# Patient Record
Sex: FEMALE | Race: WHITE | NOT HISPANIC OR LATINO | Employment: OTHER | URBAN - METROPOLITAN AREA
[De-identification: names, ages, dates, MRNs, and addresses within clinical notes are randomized per-mention and may not be internally consistent; named-entity substitution may affect disease eponyms.]

---

## 2017-01-31 ENCOUNTER — TELEPHONE (OUTPATIENT)
Dept: ONCOLOGY | Facility: CLINIC | Age: 42
End: 2017-01-31

## 2017-01-31 ENCOUNTER — CONSULT (OUTPATIENT)
Dept: ONCOLOGY | Facility: CLINIC | Age: 42
End: 2017-01-31

## 2017-01-31 VITALS
RESPIRATION RATE: 16 BRPM | DIASTOLIC BLOOD PRESSURE: 58 MMHG | WEIGHT: 193 LBS | TEMPERATURE: 98.3 F | BODY MASS INDEX: 31.02 KG/M2 | HEART RATE: 83 BPM | HEIGHT: 66 IN | SYSTOLIC BLOOD PRESSURE: 109 MMHG

## 2017-01-31 DIAGNOSIS — R91.8 LUNG NODULE, MULTIPLE: Primary | ICD-10-CM

## 2017-01-31 DIAGNOSIS — E27.8 ADRENAL NODULE (HCC): ICD-10-CM

## 2017-01-31 PROBLEM — E27.9 ADRENAL NODULE: Status: ACTIVE | Noted: 2017-01-31

## 2017-01-31 PROCEDURE — 99205 OFFICE O/P NEW HI 60 MIN: CPT | Performed by: INTERNAL MEDICINE

## 2017-01-31 RX ORDER — TRAMADOL HYDROCHLORIDE 50 MG/1
TABLET ORAL
Refills: 0 | COMMUNITY
Start: 2017-01-17 | End: 2018-08-23

## 2017-01-31 RX ORDER — ZOLPIDEM TARTRATE 10 MG/1
TABLET ORAL
Refills: 0 | COMMUNITY
Start: 2017-01-18 | End: 2021-05-10

## 2017-01-31 NOTE — TELEPHONE ENCOUNTER
Called [facility] to make copy of CT Chest for patient to  prior to appt today.  Patient notified and will .

## 2017-01-31 NOTE — PROGRESS NOTES
CHIEF COMPLAINT:     Problem List:  Patient Active Problem List   Diagnosis   • CRPS (complex regional pain syndrome)   • Knee pain   • Depression   • Cervical facet arthropathy/cervical spondylosis without myelopathy   • Atrophy, muscle disuse   • Myofascial pain   • Muscle spasm   • Suprascapular entrapment neuropathy of right side   • Frozen shoulder   • Insomnia   • Mild obesity   • At high risk for falls   • Chronic tension-type headache, not intractable   • Physical deconditioning   • Gait disturbance   • Common migraine without aura   • Adrenal nodule   • Lung nodule, multiple         RECORDS OBTAINED:  Records of the patients history including those obtained from Dr. Nirmala urias. provider found were reviewed and summarized in detail.    HISTORY OF PRESENT ILLNESS:    ***    Past Medical History   Diagnosis Date   • Abnormal MRI, shoulder      Right Shoulder MRI 12/2010 revealed small glenohumeral joint effusion, capsulitis, tendinitis. No rotator cuff for labral tear was noted.    • Abnormal x-ray      X-Ray of the right wrist on 04/30/2014, revealed linear sclerosis within the distal radial metaphysis consistent with healed fracture. There is irregularity of the ulnar styloid consistent with prior fracture.     • Depression    • History of constipation    • History of insomnia      Seconday to pain   • History of migraine headaches    • History of varicose veins    • Pain in joint, shoulder region        Current Outpatient Prescriptions on File Prior to Visit   Medication Sig Dispense Refill   • amLODIPine (NORVASC) 2.5 MG tablet Take 1 tablet by mouth daily.     • buPROPion SR (WELLBUTRIN SR) 150 MG 12 hr tablet   0   • chlorthalidone (HYGROTEN) 50 MG tablet Take 1 tablet by mouth daily.     • DULoxetine (CYMBALTA) 60 MG capsule TAKE 1 CAPSULE BY MOUTH ONCE DAILY 30 capsule 3   • furosemide (LASIX) 20 MG tablet Take 1 tablet by mouth daily.     • GRALISE 600 MG tablet tablet take 1 tablet by mouth at bedtime 30  tablet 1   • LORazepam (ATIVAN) 1 MG tablet Take 1 tablet by mouth 2 (two) times a day.     • nortriptyline (PAMELOR) 10 MG capsule TAKE 1 TO 2 CAPSULES BY MOUTH EVERY BEDTIME 60 capsule 3   • predniSONE (DELTASONE) 20 MG tablet Take  by mouth.     • tiZANidine (ZANAFLEX) 2 MG tablet Take 1 tablet by mouth 4 (four) times a day as needed.     • tiZANidine (ZANAFLEX) 2 MG tablet take 1 tablet by mouth four times a day if needed for muscle spasm (KEEP APPOINTMENT FOR FURTHER REFILLS) 120 tablet 0   • ZIPSOR 25 MG capsule TAKE 1 CAPSULE BY MOUTH 4 TIMES A DAY AS NEEDED 120 capsule 1   • zolpidem (AMBIEN) 5 MG tablet Take  by mouth.       No current facility-administered medications on file prior to visit.        No Known Allergies    Past Surgical History   Procedure Laterality Date   • Rotator cuff repair       Rotator cuff repair followed by three surgeries and manipulation under anesthesia with Dr. Ludwig. Fourth surgery performed by Dr. Graves.  Removal of suture and subacromial decompression with lysis of adhesions 1/2011.   • Other surgical history       Left Distal Radiolnar Joint Stabilization and Capsulodesis   • Wrist surgery     • Back surgery  2014     SCS implant       OB History   No data available       Social History     Social History   • Marital status:      Spouse name: N/A   • Number of children: N/A   • Years of education: N/A     Social History Main Topics   • Smoking status: Never Smoker   • Smokeless tobacco: Never Used   • Alcohol use No   • Drug use: No   • Sexual activity: Not Asked     Other Topics Concern   • None     Social History Narrative       Family History   Problem Relation Age of Onset   • Diabetes Mother    • Hypertension Mother    • Stroke Mother    • Cancer Father    • Lymphoma Father    • Lung cancer Father    • Brain cancer Father    • Liver cancer Father    • Heart disease Maternal Grandmother        REVIEW OF SYSTEMS:  Review of Systems  ***  .  Visit Vitals   • BP  "109/58   • Pulse 83   • Temp 98.3 °F (36.8 °C)   • Resp 16   • Ht 66\" (167.6 cm)   • Wt 193 lb (87.5 kg)   • BMI 31.15 kg/m2       Physical  Exam:  Physical Exam      Performance Status: ***    Assessment/Plan     ***           Gilberto Robles MD    1/31/2017          "

## 2017-01-31 NOTE — PROGRESS NOTES
CHIEF COMPLAINT:   My CT scan is abnormal       Problem List:  Patient Active Problem List   Diagnosis   • CRPS (complex regional pain syndrome)   • Knee pain   • Depression   • Cervical facet arthropathy/cervical spondylosis without myelopathy   • Atrophy, muscle disuse   • Myofascial pain   • Muscle spasm   • Suprascapular entrapment neuropathy of right side   • Frozen shoulder   • Insomnia   • Mild obesity   • At high risk for falls   • Chronic tension-type headache, not intractable   • Physical deconditioning   • Gait disturbance   • Common migraine without aura   • Adrenal nodule   • Lung nodule, multiple         RECORDS OBTAINED:  .    HISTORY OF PRESENT ILLNESS:    This very pleasant 41-year-old   female from [facility] is here due to an abnormal CT scan of chest and upper abdomen area is a nonsmoker.  She recently fell at home and injured her sternum.  As part of her evaluation she had a CT scan of the chest obtained at the time she was in the emergency room.  That was on January 10, 2017.  Minimal trauma was noted to the sternum.  She had a 4 mm noncalcified nodule in the right middle lobe and a 3 mm lesion that was noncalcified in the right lower lobe.  She had a 2.5 cm right adrenal nodule noted.  She is here due to these findings.    She is a nonsmoker.  She has been exposed to secondhand smoke from her  as well as when she was younger from her father she's had no cough shortness of breath hemoptysis.  She's had a stable weight and appetite.  She's had no recent infectious illnesses    Past Medical History   Diagnosis Date   • Abnormal MRI, shoulder      Right Shoulder MRI 12/2010 revealed small glenohumeral joint effusion, capsulitis, tendinitis. No rotator cuff for labral tear was noted.    • Abnormal x-ray      X-Ray of the right wrist on 04/30/2014, revealed linear sclerosis within the distal radial metaphysis consistent with healed fracture. There is irregularity of  the ulnar styloid consistent with prior fracture.     • Depression    • History of constipation    • History of insomnia      Seconday to pain   • History of migraine headaches    • History of varicose veins    • Pain in joint, shoulder region        Current Outpatient Prescriptions on File Prior to Visit   Medication Sig Dispense Refill   • amLODIPine (NORVASC) 2.5 MG tablet Take 1 tablet by mouth daily.     • buPROPion SR (WELLBUTRIN SR) 150 MG 12 hr tablet   0   • chlorthalidone (HYGROTEN) 50 MG tablet Take 1 tablet by mouth daily.     • DULoxetine (CYMBALTA) 60 MG capsule TAKE 1 CAPSULE BY MOUTH ONCE DAILY 30 capsule 3   • furosemide (LASIX) 20 MG tablet Take 1 tablet by mouth daily.     • GRALISE 600 MG tablet tablet take 1 tablet by mouth at bedtime 30 tablet 1   • LORazepam (ATIVAN) 1 MG tablet Take 1 tablet by mouth 2 (two) times a day.     • nortriptyline (PAMELOR) 10 MG capsule TAKE 1 TO 2 CAPSULES BY MOUTH EVERY BEDTIME 60 capsule 3   • predniSONE (DELTASONE) 20 MG tablet Take  by mouth.     • tiZANidine (ZANAFLEX) 2 MG tablet Take 1 tablet by mouth 4 (four) times a day as needed.     • tiZANidine (ZANAFLEX) 2 MG tablet take 1 tablet by mouth four times a day if needed for muscle spasm (KEEP APPOINTMENT FOR FURTHER REFILLS) 120 tablet 0   • ZIPSOR 25 MG capsule TAKE 1 CAPSULE BY MOUTH 4 TIMES A DAY AS NEEDED 120 capsule 1   • zolpidem (AMBIEN) 5 MG tablet Take  by mouth.       No current facility-administered medications on file prior to visit.        No Known Allergies    Past Surgical History   Procedure Laterality Date   • Rotator cuff repair       Rotator cuff repair followed by three surgeries and manipulation under anesthesia with Dr. Ludwig. Fourth surgery performed by Dr. Graves.  Removal of suture and subacromial decompression with lysis of adhesions 1/2011.   • Other surgical history       Left Distal Radiolnar Joint Stabilization and Capsulodesis   • Wrist surgery     • Back surgery  2014     SCS  "implant       OB History   No data available       Social History     Social History   • Marital status:      Spouse name: N/A   • Number of children: N/A   • Years of education: N/A     Social History Main Topics   • Smoking status: Never Smoker   • Smokeless tobacco: Never Used   • Alcohol use No   • Drug use: No   • Sexual activity: Not Asked     Other Topics Concern   • None     Social History Narrative       Family History   Problem Relation Age of Onset   • Diabetes Mother    • Hypertension Mother    • Stroke Mother    • Cancer Father    • Lymphoma Father    • Lung cancer Father    • Brain cancer Father    • Liver cancer Father    • Heart disease Maternal Grandmother        REVIEW OF SYSTEMS:  Review of Systems   Constitutional: Negative for activity change and appetite change.        Has chronic fatigue that really hasn't changed recently has chronic pain involving the right shoulder and right upper extremity that began initially with right shoulder surgery approximately 6 years ago.   HENT: Negative for mouth sores, sinus pressure and voice change.    Eyes: Negative for visual disturbance.   Respiratory: Negative for shortness of breath.         Solely no new pulmonary symptoms noted by the patient or her daughter   Cardiovascular: Negative for chest pain.   Gastrointestinal: Negative for abdominal pain and vomiting.   Genitourinary: Negative for dysuria.   Musculoskeletal: Negative for arthralgias and myalgias.   Skin: Negative for color change.   Neurological: Negative for dizziness, syncope and headaches.   Hematological: Negative for adenopathy.   Psychiatric/Behavioral: Negative for confusion, sleep disturbance and suicidal ideas. The patient is not nervous/anxious.        .  Visit Vitals   • /58   • Pulse 83   • Temp 98.3 °F (36.8 °C)   • Resp 16   • Ht 66\" (167.6 cm)   • Wt 193 lb (87.5 kg)   • BMI 31.15 kg/m2       Physical  Exam:  Physical Exam   Constitutional: She is oriented to " person, place, and time. She appears well-developed and well-nourished. No distress.   Isn't alert woman in no acute distress whatsoever.  Does not appear ill.   HENT:   Head: Normocephalic.   Mouth/Throat: Oropharynx is clear and moist.   No adenopathy palpable in the head and neck or throughout the exam   Eyes: Conjunctivae and EOM are normal. No scleral icterus.   Neck: Normal range of motion. Neck supple. No thyromegaly present.   Cardiovascular: Normal rate, regular rhythm and normal heart sounds.    No murmur heard.  Pulmonary/Chest: Effort normal and breath sounds normal. She has no wheezes. She has no rales.   Lung fields are clear.  Excursion is normal.   Abdominal: Soft. Bowel sounds are normal. She exhibits no distension and no mass. There is no tenderness.   Musculoskeletal: She exhibits no edema or tenderness.   Lymphadenopathy:     She has no cervical adenopathy.   Neurological: She is alert and oriented to person, place, and time. She displays normal reflexes. No cranial nerve deficit.   She has 3+ to 4+ motor strength in the entire right upper extremity compared to the left.   Skin: Skin is warm and dry. No rash noted.   Psychiatric: She has a normal mood and affect. Judgment normal.   Vitals reviewed.        Performance Status: 1    Assessment/Plan     Pleasant 41-year-old woman with very subtle noncalcified small nodules in the right lung as well as a right adrenal normality.  I suspect all of these are benign.  He very likely has noncalcified granulomas in the right lung and she very likely has a right sided adrenal adenoma.  I reassured her that I did not think any of these represented cancer.  I will go ahead and repeat her scan in about 2 months which will be 3 months from her scan obtained in early January.  I'll follow her up at that point and decide about any further surveillance etc.           Gilberto Robles MD    1/31/2017

## 2017-02-01 ENCOUNTER — TELEPHONE (OUTPATIENT)
Dept: ONCOLOGY | Facility: CLINIC | Age: 42
End: 2017-02-01

## 2017-02-01 DIAGNOSIS — E27.8 ADRENAL NODULE (HCC): Primary | ICD-10-CM

## 2017-02-01 DIAGNOSIS — R91.8 LUNG NODULE, MULTIPLE: ICD-10-CM

## 2017-02-01 DIAGNOSIS — R91.8 LUNG NODULE, MULTIPLE: Primary | ICD-10-CM

## 2017-02-01 DIAGNOSIS — E27.8 ADRENAL NODULE (HCC): ICD-10-CM

## 2017-02-01 NOTE — TELEPHONE ENCOUNTER
LM on patient vm.  Appt 4/5/17 with scans and labs at [facility] one week prior.  Mailed appt card and lab orders to patient with instructions to have labs done with scans.

## 2017-02-10 ENCOUNTER — TELEPHONE (OUTPATIENT)
Dept: PAIN MEDICINE | Facility: CLINIC | Age: 42
End: 2017-02-10

## 2017-02-17 RX ORDER — DULOXETIN HYDROCHLORIDE 60 MG/1
CAPSULE, DELAYED RELEASE ORAL
Qty: 30 CAPSULE | Refills: 3 | Status: SHIPPED | OUTPATIENT
Start: 2017-02-17 | End: 2017-06-19 | Stop reason: SDUPTHER

## 2017-02-17 RX ORDER — GABAPENTIN 600 MG/1
TABLET, FILM COATED ORAL
Qty: 30 TABLET | Refills: 1 | Status: SHIPPED | OUTPATIENT
Start: 2017-02-17 | End: 2017-04-11 | Stop reason: SDUPTHER

## 2017-02-17 RX ORDER — NORTRIPTYLINE HYDROCHLORIDE 10 MG/1
CAPSULE ORAL
Qty: 60 CAPSULE | Refills: 3 | Status: SHIPPED | OUTPATIENT
Start: 2017-02-17 | End: 2017-06-15 | Stop reason: SDUPTHER

## 2017-03-21 ENCOUNTER — TELEPHONE (OUTPATIENT)
Dept: ONCOLOGY | Facility: CLINIC | Age: 42
End: 2017-03-21

## 2017-03-21 NOTE — TELEPHONE ENCOUNTER
----- Message from Nettie Peck sent at 3/21/2017  1:17 PM EDT -----  Regarding: CINDA-QUESTIONS  Contact: [phone]  Patient called and wants Annmarie to call her she has several questions.

## 2017-03-21 NOTE — TELEPHONE ENCOUNTER
Patient requesting her CT Scan be with contrast.  She has no allergy or reaction to the contrast and not sure why in the past it has not been ordered.      I explained to patient that Dr. Robles was out until Wednesday.  I would discuss with him and call her back.

## 2017-03-22 DIAGNOSIS — R91.1 LUNG NODULE: Primary | ICD-10-CM

## 2017-03-22 DIAGNOSIS — E27.8 ADRENAL NODULE (HCC): ICD-10-CM

## 2017-03-24 ENCOUNTER — TELEPHONE (OUTPATIENT)
Dept: ONCOLOGY | Facility: CLINIC | Age: 42
End: 2017-03-24

## 2017-03-24 NOTE — TELEPHONE ENCOUNTER
Per [facility], NPO 3 hrs prior to scan.  Water and meds ok to take.  Have labs drawn before scan on Monday.  Lab is open today 7-7, Sat 7-1, Sunday 7-12.  Arrive at least 15 minutes prior to 1pm on Monday 3/27/17.  Someone from [facility] will be calling to pre-register.  Instructed to call [facility] with any further questions.

## 2017-03-28 ENCOUNTER — TELEPHONE (OUTPATIENT)
Dept: ONCOLOGY | Facility: CLINIC | Age: 42
End: 2017-03-28

## 2017-03-28 RX ORDER — POTASSIUM CHLORIDE 1500 MG/1
20 TABLET, FILM COATED, EXTENDED RELEASE ORAL DAILY
Qty: 7 TABLET | Refills: 0 | Status: SHIPPED | OUTPATIENT
Start: 2017-03-28 | End: 2021-03-22

## 2017-03-28 NOTE — TELEPHONE ENCOUNTER
Per Dr. Robles, LM on patient vm that her potassium level was 3.1.  She needs to take KCl 20 meq daily for 7 days.  Dr. Robles thought she may be on K+ and Lasix already but it was not listed in chart.  I sent in K+ to pharmacy in case she needed.  I called and left message on her PCP Chucho Sumner MD clinical asst's vm. I faxed results also.\  Dr. Sumner phone# [phone]  Fax# [phone]

## 2017-03-30 ENCOUNTER — DOCUMENTATION (OUTPATIENT)
Dept: ONCOLOGY | Facility: CLINIC | Age: 42
End: 2017-03-30

## 2017-03-30 NOTE — PROGRESS NOTES
Patient's repeat CT scan is stable.  I will see her on April 5 at her appointment.  I called and left a message on her cell phone.

## 2017-04-11 RX ORDER — GABAPENTIN 600 MG/1
TABLET, FILM COATED ORAL
Qty: 30 TABLET | Refills: 1 | Status: SHIPPED | OUTPATIENT
Start: 2017-04-11 | End: 2017-06-19 | Stop reason: SDUPTHER

## 2017-04-11 RX ORDER — DICLOFENAC POTASSIUM 25 MG/1
CAPSULE, LIQUID FILLED ORAL
Qty: 120 CAPSULE | Refills: 1 | Status: SHIPPED | OUTPATIENT
Start: 2017-04-11 | End: 2017-08-15 | Stop reason: SDUPTHER

## 2017-04-12 ENCOUNTER — OUTSIDE FACILITY SERVICE (OUTPATIENT)
Dept: PAIN MEDICINE | Facility: CLINIC | Age: 42
End: 2017-04-12

## 2017-04-12 PROCEDURE — 64634 DESTROY C/TH FACET JNT ADDL: CPT | Performed by: ANESTHESIOLOGY

## 2017-04-12 PROCEDURE — 99152 MOD SED SAME PHYS/QHP 5/>YRS: CPT | Performed by: ANESTHESIOLOGY

## 2017-04-12 PROCEDURE — 99153 MOD SED SAME PHYS/QHP EA: CPT | Performed by: ANESTHESIOLOGY

## 2017-04-12 PROCEDURE — 64633 DESTROY CERV/THOR FACET JNT: CPT | Performed by: ANESTHESIOLOGY

## 2017-06-16 RX ORDER — NORTRIPTYLINE HYDROCHLORIDE 10 MG/1
CAPSULE ORAL
Qty: 60 CAPSULE | Refills: 3 | Status: SHIPPED | OUTPATIENT
Start: 2017-06-16 | End: 2017-10-12 | Stop reason: SDUPTHER

## 2017-06-19 RX ORDER — GABAPENTIN 600 MG/1
TABLET, FILM COATED ORAL
Qty: 30 TABLET | Refills: 1 | Status: SHIPPED | OUTPATIENT
Start: 2017-06-19 | End: 2017-08-21 | Stop reason: SDUPTHER

## 2017-06-19 RX ORDER — DULOXETIN HYDROCHLORIDE 60 MG/1
CAPSULE, DELAYED RELEASE ORAL
Qty: 30 CAPSULE | Refills: 3 | Status: SHIPPED | OUTPATIENT
Start: 2017-06-19 | End: 2017-10-18 | Stop reason: SDUPTHER

## 2017-07-20 RX ORDER — TIZANIDINE 2 MG/1
TABLET ORAL
Qty: 120 TABLET | Refills: 3 | Status: SHIPPED | OUTPATIENT
Start: 2017-07-20 | End: 2018-03-20 | Stop reason: SDUPTHER

## 2017-08-15 RX ORDER — DICLOFENAC POTASSIUM 25 MG/1
CAPSULE, LIQUID FILLED ORAL
Qty: 120 CAPSULE | Refills: 1 | Status: SHIPPED | OUTPATIENT
Start: 2017-08-15 | End: 2017-12-14 | Stop reason: SDUPTHER

## 2017-08-23 RX ORDER — GABAPENTIN 600 MG/1
TABLET, FILM COATED ORAL
Qty: 30 TABLET | Refills: 3 | OUTPATIENT
Start: 2017-08-23 | End: 2017-12-19 | Stop reason: SDUPTHER

## 2017-10-12 RX ORDER — NORTRIPTYLINE HYDROCHLORIDE 10 MG/1
CAPSULE ORAL
Qty: 60 CAPSULE | Refills: 3 | Status: SHIPPED | OUTPATIENT
Start: 2017-10-12 | End: 2018-02-19 | Stop reason: SDUPTHER

## 2017-10-18 RX ORDER — DULOXETIN HYDROCHLORIDE 60 MG/1
CAPSULE, DELAYED RELEASE ORAL
Qty: 30 CAPSULE | Refills: 3 | Status: SHIPPED | OUTPATIENT
Start: 2017-10-18 | End: 2018-02-19 | Stop reason: SDUPTHER

## 2017-12-15 RX ORDER — DICLOFENAC POTASSIUM 25 MG/1
CAPSULE, LIQUID FILLED ORAL
Qty: 120 CAPSULE | Refills: 1 | Status: SHIPPED | OUTPATIENT
Start: 2017-12-15 | End: 2018-03-20 | Stop reason: SDUPTHER

## 2017-12-19 RX ORDER — GABAPENTIN 600 MG/1
TABLET, FILM COATED ORAL
Qty: 30 TABLET | Refills: 6 | OUTPATIENT
Start: 2017-12-19 | End: 2018-06-20 | Stop reason: SDUPTHER

## 2017-12-21 ENCOUNTER — HOSPITAL ENCOUNTER (OUTPATIENT)
Age: 42
Setting detail: OBSERVATION
LOS: 1 days | Discharge: HOME | End: 2017-12-22
Payer: MEDICARE

## 2017-12-21 DIAGNOSIS — E87.6: Primary | ICD-10-CM

## 2017-12-21 PROCEDURE — 80048 BASIC METABOLIC PNL TOTAL CA: CPT

## 2017-12-21 PROCEDURE — 93005 ELECTROCARDIOGRAM TRACING: CPT

## 2017-12-21 PROCEDURE — G0378 HOSPITAL OBSERVATION PER HR: HCPCS

## 2017-12-21 PROCEDURE — 82330 ASSAY OF CALCIUM: CPT

## 2017-12-21 PROCEDURE — 80053 COMPREHEN METABOLIC PANEL: CPT

## 2017-12-21 PROCEDURE — 84100 ASSAY OF PHOSPHORUS: CPT

## 2017-12-21 PROCEDURE — 83735 ASSAY OF MAGNESIUM: CPT

## 2017-12-21 PROCEDURE — 99285 EMERGENCY DEPT VISIT HI MDM: CPT

## 2017-12-21 PROCEDURE — 36415 COLL VENOUS BLD VENIPUNCTURE: CPT

## 2017-12-21 PROCEDURE — 85025 COMPLETE CBC W/AUTO DIFF WBC: CPT

## 2017-12-21 PROCEDURE — 96365 THER/PROPH/DIAG IV INF INIT: CPT

## 2018-02-19 RX ORDER — DULOXETIN HYDROCHLORIDE 60 MG/1
CAPSULE, DELAYED RELEASE ORAL
Qty: 30 CAPSULE | Refills: 3 | Status: SHIPPED | OUTPATIENT
Start: 2018-02-19 | End: 2018-06-20 | Stop reason: SDUPTHER

## 2018-02-19 RX ORDER — NORTRIPTYLINE HYDROCHLORIDE 10 MG/1
CAPSULE ORAL
Qty: 60 CAPSULE | Refills: 3 | Status: SHIPPED | OUTPATIENT
Start: 2018-02-19 | End: 2018-06-20 | Stop reason: SDUPTHER

## 2018-03-20 RX ORDER — DICLOFENAC POTASSIUM 25 MG/1
CAPSULE, LIQUID FILLED ORAL
Qty: 120 CAPSULE | Refills: 6 | Status: SHIPPED | OUTPATIENT
Start: 2018-03-20 | End: 2018-11-01 | Stop reason: SDUPTHER

## 2018-03-20 RX ORDER — TIZANIDINE 2 MG/1
TABLET ORAL
Qty: 120 TABLET | Refills: 6 | Status: SHIPPED | OUTPATIENT
Start: 2018-03-20 | End: 2018-11-01 | Stop reason: SDUPTHER

## 2018-03-23 ENCOUNTER — HOSPITAL ENCOUNTER (INPATIENT)
Dept: HOSPITAL 22 - ER | Age: 43
LOS: 2 days | Discharge: HOME | DRG: 641 | End: 2018-03-25
Payer: MEDICARE

## 2018-03-23 VITALS
DIASTOLIC BLOOD PRESSURE: 33 MMHG | OXYGEN SATURATION: 98 % | RESPIRATION RATE: 20 BRPM | HEART RATE: 94 BPM | SYSTOLIC BLOOD PRESSURE: 87 MMHG

## 2018-03-23 VITALS
OXYGEN SATURATION: 99 % | RESPIRATION RATE: 16 BRPM | DIASTOLIC BLOOD PRESSURE: 60 MMHG | HEART RATE: 100 BPM | SYSTOLIC BLOOD PRESSURE: 115 MMHG

## 2018-03-23 VITALS
RESPIRATION RATE: 14 BRPM | SYSTOLIC BLOOD PRESSURE: 145 MMHG | OXYGEN SATURATION: 99 % | HEART RATE: 91 BPM | TEMPERATURE: 99 F | DIASTOLIC BLOOD PRESSURE: 46 MMHG

## 2018-03-23 VITALS — HEART RATE: 95 BPM

## 2018-03-23 VITALS — HEART RATE: 100 BPM

## 2018-03-23 VITALS
TEMPERATURE: 98.3 F | SYSTOLIC BLOOD PRESSURE: 92 MMHG | RESPIRATION RATE: 16 BRPM | HEART RATE: 107 BPM | OXYGEN SATURATION: 99 % | DIASTOLIC BLOOD PRESSURE: 68 MMHG

## 2018-03-23 VITALS
SYSTOLIC BLOOD PRESSURE: 126 MMHG | TEMPERATURE: 97.16 F | HEART RATE: 100 BPM | DIASTOLIC BLOOD PRESSURE: 84 MMHG | OXYGEN SATURATION: 98 % | RESPIRATION RATE: 18 BRPM

## 2018-03-23 VITALS — BODY MASS INDEX: 24.2 KG/M2 | BODY MASS INDEX: 24.5 KG/M2

## 2018-03-23 VITALS — HEART RATE: 96 BPM

## 2018-03-23 DIAGNOSIS — R55: ICD-10-CM

## 2018-03-23 DIAGNOSIS — S00.03XA: ICD-10-CM
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
## 2018-03-23 DIAGNOSIS — E89.0: ICD-10-CM

## 2018-03-23 DIAGNOSIS — E87.6: Primary | ICD-10-CM

## 2018-03-23 DIAGNOSIS — Z91.81: ICD-10-CM

## 2018-03-23 LAB
ALBUMIN LEVEL: 4 GM/DL (ref 3.4–5)
ALBUMIN/GLOB SERPL: 1 {RATIO} (ref 1.1–1.8)
ALP ISO SERPL-ACNC: 63 U/L (ref 46–116)
ALT SERPLBLD-CCNC: 17 U/L (ref 12–78)
ANION GAP SERPL CALC-SCNC: 11 MEQ/L (ref 5–15)
AST SERPL QL: 7 U/L (ref 15–37)
BACTERIA URNS QL MICRO: (no result) /LPF
BILIRUB UR STRIP-MCNC: (no result) MG/DL
BILIRUBIN,TOTAL: 0.2 MG/DL (ref 0.2–1)
BUN SERPL-MCNC: 13 MG/DL (ref 7–18)
CALCIUM SPEC-MCNC: 9.1 MG/DL (ref 8.5–10.1)
CHLORIDE SPEC-SCNC: 97 MMOL/L (ref 98–107)
CK SERPL-CCNC: 119 U/L (ref 26–192)
CKMB RELATIVE INDEX: 0.4 U/L (ref 0–4)
CO2 SERPL-SCNC: 30 MMOL/L (ref 21–32)
COLOR UR: (no result)
CREAT BLD-SCNC: 1.28 MG/DL (ref 0.55–1.02)
CREATININE CLEARANCE ESTIMATED: 60 ML/MIN (ref 0–300)
ESTIMATED GLOMERULAR FILT RATE: 45 ML/MIN (ref 60–?)
GFR (AFRICAN AMERICAN): 55 ML/MIN (ref 60–?)
GLOBULIN SER CALC-MCNC: 4.2 GM/DL (ref 1.3–3.2)
GLUCOSE: 136 MG/DL (ref 74–106)
HCT VFR BLD CALC: 44.2 % (ref 37–47)
HGB BLD-MCNC: 13.1 G/DL (ref 12.2–16.2)
KETONES UR STRIP.AUTO-MCNC: (no result) MG/DL
LEUKOCYTE ESTERASE UR QL STRIP: (no result)
MCHC RBC-ENTMCNC: 29.6 G/DL (ref 31.8–35.4)
MCV RBC: 77.8 FL (ref 81–99)
MEAN CORPUSCULAR HEMOGLOBIN: 23 PG (ref 27–31.2)
MICRO URNS: (no result)
PH UR: 5.5 [PH] (ref 5–8.5)
PLATELET # BLD: 634 K/MM3 (ref 142–424)
POTASSIUM: 2 MMOL/L (ref 3.5–5.1)
POTASSIUM: 2.7 MMOL/L (ref 3.5–5.1)
PROT SERPL-MCNC: 8.2 GM/DL (ref 6.4–8.2)
PROT UR STRIP-MCNC: (no result) MG/DL
RBC # BLD AUTO: 5.69 M/MM3 (ref 4.2–5.4)
RBC #/AREA URNS HPF: (no result) #/HPF (ref 0–3)
SODIUM SPEC-SCNC: 136 MMOL/L (ref 136–145)
SP GR UR: >= 1.03 (ref 1–1.03)
TROPONIN I: < 0.02 NG/ML (ref 0–0.06)
UROBILINOGEN UR QL: 0.2 EU/DL
WBC # BLD AUTO: 9.9 K/MM3 (ref 4.8–10.8)

## 2018-03-23 PROCEDURE — 96366 THER/PROPH/DIAG IV INF ADDON: CPT

## 2018-03-23 PROCEDURE — 72125 CT NECK SPINE W/O DYE: CPT

## 2018-03-23 PROCEDURE — 72170 X-RAY EXAM OF PELVIS: CPT

## 2018-03-23 PROCEDURE — 84484 ASSAY OF TROPONIN QUANT: CPT

## 2018-03-23 PROCEDURE — 93005 ELECTROCARDIOGRAM TRACING: CPT

## 2018-03-23 PROCEDURE — 80053 COMPREHEN METABOLIC PANEL: CPT

## 2018-03-23 PROCEDURE — 99284 EMERGENCY DEPT VISIT MOD MDM: CPT

## 2018-03-23 PROCEDURE — 84132 ASSAY OF SERUM POTASSIUM: CPT

## 2018-03-23 PROCEDURE — 36415 COLL VENOUS BLD VENIPUNCTURE: CPT

## 2018-03-23 PROCEDURE — 96365 THER/PROPH/DIAG IV INF INIT: CPT

## 2018-03-23 PROCEDURE — 80305 DRUG TEST PRSMV DIR OPT OBS: CPT

## 2018-03-23 PROCEDURE — 71045 X-RAY EXAM CHEST 1 VIEW: CPT

## 2018-03-23 PROCEDURE — 87086 URINE CULTURE/COLONY COUNT: CPT

## 2018-03-23 PROCEDURE — 82553 CREATINE MB FRACTION: CPT

## 2018-03-23 PROCEDURE — 81001 URINALYSIS AUTO W/SCOPE: CPT

## 2018-03-23 PROCEDURE — 85025 COMPLETE CBC W/AUTO DIFF WBC: CPT

## 2018-03-23 PROCEDURE — 82550 ASSAY OF CK (CPK): CPT

## 2018-03-23 PROCEDURE — 80048 BASIC METABOLIC PNL TOTAL CA: CPT

## 2018-03-23 PROCEDURE — 70450 CT HEAD/BRAIN W/O DYE: CPT

## 2018-03-23 PROCEDURE — 84703 CHORIONIC GONADOTROPIN ASSAY: CPT

## 2018-03-24 VITALS
OXYGEN SATURATION: 98 % | DIASTOLIC BLOOD PRESSURE: 54 MMHG | TEMPERATURE: 99.2 F | SYSTOLIC BLOOD PRESSURE: 114 MMHG | HEART RATE: 80 BPM | RESPIRATION RATE: 16 BRPM

## 2018-03-24 VITALS
OXYGEN SATURATION: 94 % | RESPIRATION RATE: 16 BRPM | HEART RATE: 93 BPM | DIASTOLIC BLOOD PRESSURE: 50 MMHG | SYSTOLIC BLOOD PRESSURE: 91 MMHG | TEMPERATURE: 97.6 F

## 2018-03-24 VITALS
HEART RATE: 100 BPM | OXYGEN SATURATION: 100 % | RESPIRATION RATE: 16 BRPM | DIASTOLIC BLOOD PRESSURE: 61 MMHG | TEMPERATURE: 98.8 F | SYSTOLIC BLOOD PRESSURE: 113 MMHG

## 2018-03-24 VITALS
HEART RATE: 90 BPM | OXYGEN SATURATION: 99 % | DIASTOLIC BLOOD PRESSURE: 65 MMHG | RESPIRATION RATE: 20 BRPM | TEMPERATURE: 98.4 F | SYSTOLIC BLOOD PRESSURE: 111 MMHG

## 2018-03-24 VITALS
RESPIRATION RATE: 20 BRPM | TEMPERATURE: 98.3 F | OXYGEN SATURATION: 100 % | DIASTOLIC BLOOD PRESSURE: 48 MMHG | HEART RATE: 92 BPM | SYSTOLIC BLOOD PRESSURE: 104 MMHG

## 2018-03-24 VITALS
OXYGEN SATURATION: 98 % | RESPIRATION RATE: 16 BRPM | SYSTOLIC BLOOD PRESSURE: 120 MMHG | DIASTOLIC BLOOD PRESSURE: 75 MMHG | TEMPERATURE: 99.14 F | HEART RATE: 108 BPM

## 2018-03-24 VITALS — OXYGEN SATURATION: 96 % | HEART RATE: 80 BPM

## 2018-03-24 VITALS
HEART RATE: 87 BPM | DIASTOLIC BLOOD PRESSURE: 51 MMHG | RESPIRATION RATE: 20 BRPM | TEMPERATURE: 98.2 F | SYSTOLIC BLOOD PRESSURE: 101 MMHG | OXYGEN SATURATION: 99 %

## 2018-03-24 VITALS — HEART RATE: 80 BPM

## 2018-03-24 LAB
ANION GAP SERPL CALC-SCNC: 9.6 MEQ/L (ref 5–15)
BUN SERPL-MCNC: 15 MG/DL (ref 7–18)
CHLORIDE SPEC-SCNC: 99 MMOL/L (ref 98–107)
CO2 SERPL-SCNC: 31 MMOL/L (ref 21–32)
CREAT BLD-SCNC: 1.12 MG/DL (ref 0.55–1.02)
CREATININE CLEARANCE ESTIMATED: 70 ML/MIN (ref 0–300)
ESTIMATED GLOMERULAR FILT RATE: 53 ML/MIN (ref 60–?)
GFR (AFRICAN AMERICAN): 64 ML/MIN (ref 60–?)
GLUCOSE: 105 MG/DL (ref 74–106)
POTASSIUM: 2.6 MMOL/L (ref 3.5–5.1)
SODIUM SPEC-SCNC: 137 MMOL/L (ref 136–145)

## 2018-03-25 VITALS
RESPIRATION RATE: 16 BRPM | SYSTOLIC BLOOD PRESSURE: 108 MMHG | DIASTOLIC BLOOD PRESSURE: 55 MMHG | HEART RATE: 94 BPM | OXYGEN SATURATION: 97 % | TEMPERATURE: 99 F

## 2018-03-25 VITALS
SYSTOLIC BLOOD PRESSURE: 115 MMHG | TEMPERATURE: 98.06 F | DIASTOLIC BLOOD PRESSURE: 65 MMHG | RESPIRATION RATE: 16 BRPM | OXYGEN SATURATION: 100 % | HEART RATE: 84 BPM

## 2018-03-25 VITALS — HEART RATE: 80 BPM

## 2018-03-25 VITALS — HEART RATE: 100 BPM

## 2018-03-25 LAB
ANION GAP SERPL CALC-SCNC: 8.4 MEQ/L (ref 5–15)
BUN SERPL-MCNC: 12 MG/DL (ref 7–18)
CHLORIDE SPEC-SCNC: 107 MMOL/L (ref 98–107)
CO2 SERPL-SCNC: 31 MMOL/L (ref 21–32)
CREAT BLD-SCNC: 0.94 MG/DL (ref 0.55–1.02)
CREATININE CLEARANCE ESTIMATED: 83 ML/MIN (ref 0–300)
ESTIMATED GLOMERULAR FILT RATE: 65 ML/MIN (ref 60–?)
GFR (AFRICAN AMERICAN): 78 ML/MIN (ref 60–?)
GLUCOSE: 98 MG/DL (ref 74–106)
POTASSIUM: 3.4 MMOL/L (ref 3.5–5.1)
SODIUM SPEC-SCNC: 143 MMOL/L (ref 136–145)

## 2018-06-19 RX ORDER — NORTRIPTYLINE HYDROCHLORIDE 10 MG/1
CAPSULE ORAL
Qty: 60 CAPSULE | Refills: 3 | Status: CANCELLED | OUTPATIENT
Start: 2018-06-19

## 2018-06-19 RX ORDER — GABAPENTIN 600 MG/1
TABLET, FILM COATED ORAL
Qty: 30 TABLET | Refills: 5 | Status: CANCELLED | OUTPATIENT
Start: 2018-06-19

## 2018-06-19 RX ORDER — DULOXETIN HYDROCHLORIDE 60 MG/1
CAPSULE, DELAYED RELEASE ORAL
Qty: 30 CAPSULE | Refills: 3 | Status: CANCELLED | OUTPATIENT
Start: 2018-06-19

## 2018-06-20 RX ORDER — DULOXETIN HYDROCHLORIDE 60 MG/1
60 CAPSULE, DELAYED RELEASE ORAL DAILY
Qty: 30 CAPSULE | Refills: 3 | Status: SHIPPED | OUTPATIENT
Start: 2018-06-20 | End: 2018-10-04 | Stop reason: SDUPTHER

## 2018-06-20 RX ORDER — NORTRIPTYLINE HYDROCHLORIDE 10 MG/1
CAPSULE ORAL
Qty: 60 CAPSULE | Refills: 3 | Status: SHIPPED | OUTPATIENT
Start: 2018-06-20 | End: 2018-10-05 | Stop reason: SDUPTHER

## 2018-06-20 NOTE — TELEPHONE ENCOUNTER
Received refill request from patient's pharmacy for Gralise, Duloxetine and Nortriptyline. Gralise isn't showing on KARY. We called patient's pharmacy and she is filling the medication every month as she should. They faxed records of where they have filled it every month and it is scanned in with [facility] report # [id number].     Per Dr. Randle: ok to refill

## 2018-07-20 ENCOUNTER — TELEPHONE (OUTPATIENT)
Dept: PAIN MEDICINE | Facility: CLINIC | Age: 43
End: 2018-07-20

## 2018-08-01 ENCOUNTER — HOSPITAL ENCOUNTER (OUTPATIENT)
Dept: GENERAL RADIOLOGY | Facility: HOSPITAL | Age: 43
Discharge: HOME | End: 2018-08-01
Attending: ANESTHESIOLOGY | Admitting: ANESTHESIOLOGY

## 2018-08-01 DIAGNOSIS — Z96.89 SPINAL CORD STIMULATOR STATUS: ICD-10-CM

## 2018-08-01 DIAGNOSIS — M47.812 ARTHROPATHY OF CERVICAL FACET JOINT: Primary | ICD-10-CM

## 2018-08-01 PROCEDURE — 72040 X-RAY EXAM NECK SPINE 2-3 VW: CPT

## 2018-08-16 RX ORDER — GABAPENTIN 600 MG/1
TABLET, FILM COATED ORAL
Qty: 30 TABLET | Refills: 1 | Status: CANCELLED | OUTPATIENT
Start: 2018-08-16

## 2018-08-16 NOTE — PROGRESS NOTES
"Chief Complaint: \"I started feeling that the stimulator was not working about 3-4 months ago. I had to recharge all day every day to get the system fully charged.\"    Brief History: Mrs. Araya returns to the clinic for evaluation of her chronic neck, bilateral shoulder and bilateral upper extremity pain and possible SCS reprogramming. X-rays at her last visit -while performing her cervical medial branch blocks - revealed that the lead on the left side of the posterior epidural space had caudally migrated. The right lead remained in the cervical region with top electrodes projecting at the interspace at C6-C7 on the right side, also with some caudal migration from C3 (original placement site).  Patient had a history of multiple falls with could have caused migration of the leads.  Patient was last seen on 04/17/2017 for bilateral cervical RFTC, from which she experienced modest pain relief of her cervicalgia that lasted for a short period of time. There are no changes in her medical history.   Pain level today is rated as 7/10  Pain location: Cervical   Pain radiation: Bilateral shoulder and bilateral upper extremities  Pain increases with movement of the cervical spine particularly extension and rotation of the cervical spine, and also with movement of her upper extremities involving her shoulders.  Pain decreases with rest  SCS: patient was originally implanted in 1/2014 with [employer] incensor MRI compatible Sure Scan with two leads with the top electrodes projecting at the level of the superior endplate of C3.      Associated symptoms:  Patient reports intermittent tingling or numbness and weakness in the upper extremities  Patient denies any new bladder or bowel problems   Patient denies difficulties with her balance or any recent falls  Patient reports daily cervicogenic headaches lasting throughout the day.    In terms of analgesics, she takes Gralise 600 mg at bedtime, nortriptyline 10-20 mg q.h.s., " Zipsor 25 mg q.i.d., tizanidine 2 mg p.r.n. Patient also takes Ambien, Ativan, Cymbalta, and Wellbutrin. Patient denies any side effects from her medications. [facility] report #[id number], appropriate.     Review of New Diagnostic Studies:    XR SPINE CERVICAL 2 OR 3 VIEWS - 8/1/2018. One lead is seen with the superior electrodes projecting at the level of the superior endplate of the C7 vertebral endplate. Straightening of the usual lordosis. Facets are aligned. Disc space narrowing and osteophytic spurring, unchanged. No appreciable prevertebral soft tissue swelling. Spinal cord stimulator lead terminates at C7. Lung apices are clear.    Review of Previous Diagnostic Studies:    Right Shoulder MRI 12/2010 revealed small glenohumeral joint effusion, capsulitis, tendinitis. No rotator cuff for labral tear was noted  X-ray of the right wrist on 04/30/2014, revealed linear sclerosis within the distal radial metaphysis consistent with healed fracture. There is irregularity of the ulnar styloid consistent with prior fracture. Ulnar positive variance. Mild soft tissue swelling about the wrist.     Review of Systems   Constitutional: Positive for activity change, diaphoresis and fatigue.   HENT: Positive for congestion.    Respiratory: Positive for chest tightness.    Cardiovascular: Positive for chest pain.   Gastrointestinal: Positive for constipation.   Musculoskeletal: Positive for arthralgias, back pain, gait problem, joint swelling, myalgias, neck pain and neck stiffness.   Neurological: Positive for dizziness, tremors, weakness, light-headedness, numbness and headaches.   Hematological: Bruises/bleeds easily.   Psychiatric/Behavioral: Positive for agitation, confusion, decreased concentration and sleep disturbance. The patient is nervous/anxious (depression).    All other systems reviewed and are negative.     The following portions of the patient's history were reviewed and updated as appropriate: problem list, past  "medical history, past surgery history, social history, family history, medications, and allergies     /60   Pulse 82   Temp 98.2 °F (36.8 °C) (Temporal Artery )   Resp 18   Ht 167.6 cm (66\")   Wt 71.8 kg (158 lb 3.2 oz)   SpO2 99%   BMI 25.53 kg/m²      Physical Exam   Neurologic Exam  General appearance: No acute distress, well appearing and well nourished. Appears healthy, obese, well hydrated and appearance reflects stated age.   Head and face: Normal.    Palpation of the face and sinuses: No sinus tenderness.    Eyes: Conjunctiva and lids: No swelling, erythema or discharge.    Pupils and irises: Equal, round, reactive to light.    Ears, Nose, Mouth, and Throat    External inspection of ears and nose: Normal.    Oropharynx: Normal with no erythema, edema, exudate or lesions.    Neck: Supple, symmetric, trachea midline, no masses. The neck was normal and was supple.    Pulmonary    Respiratory effort: No increased work of breathing or signs of respiratory distress.    Auscultation of lungs: Clear to auscultation.    Cardiovascular    Auscultation of heart: Normal rate and rhythm, normal S1 and S2, no murmurs.    Peripheral vascular exam: Normal.    Examination of extremities for edema and/or varicosities: Normal.    Abdomen: Non-tender, no masses. Bowel sounds were normal. The abdomen was soft and nontender. No masses palpated.    Musculoskeletal    Gait and station: Normal. The range of motion of the cervical spine is limited secondary to pain. Cervical facet joint loading maneuvers are positive. The range of motion of the right glenohumeral joint is limited particularly to abduction above 60 degrees. The range of motion of the left shoulder is full and without pain. Rotator cuff strength: 5/5  Muscle strength/tone: Normal.    Skin and subcutaneous tissue: Normal without rashes or lesions.    Palpation of skin and subcutaneous tissue: Normal turgor. Spinal cord stimulator implant site: Surgical " wounds are well healed and with complete epithelialization of both surgical wounds. There is no redness, drainage, or fluid accumulation at the surgical sites. There is no tenderness upon mobilization of the IPG in the IPG pocket.    Neurologic    Cranial nerves: Cranial nerves II-XII intact.    Cortical function: Normal mental status.    Reflexes: 3+ and symmetric, no ankle clonus on the right and no ankle clonus on the left. Superficial/Primitive Reflexes: Babinski reflex absent on the right, Babinski reflex absent on the left and primitive reflexes were absent. Spurling sign is negative. Lhermitte sign is negative. Straight leg raising test is negative.    Sensation: No sensory loss. There is a remarkable reduction in the hyperalgesia, hyperesthesia, and allodynia in the right forearm and right hand during the examination and with the spinal cord stimulator on. Minimal hyperalgesia, hyperesthesia, or allodynia on the left shoulder   Coordination: Normal finger to nose and heel to shin.    Psychiatric    Judgment and insight: Normal.    Orientation to person, place, and time: Normal.    Recent and remote memory: Intact.    Mood and affect: Flat and depressed.    PROCEDURE: Analysis of the spinal cord stimulator device with complex spinal cord stimulator reprogramming   Patient used the [employer] spinal cord stimulator device for 4407 hours since last reprogramming, and 15,543 lifetime hours (average 24 hours per day). Analysis of impedence reveals normal impedence for all contacts.  The spinal cord stimulator device was reprogrammed under my direct supervision and one program was created by adjusting electrode polarities, amplitude, pulse width,  pulse rate, in the following fashion;    Program F1   Electrode polarities: 1+, 3-, 4-, 6+  Amplitude: 3.8 mA     Pulse width: 180 mcs  Rate: 10 Hz    Patient experienced pain relief of her right shoulder pain. Due to lead migration, we could not provide stimulation to  her neck, left shoulder and her bilateral upper extremities.   Time spent reprogrammin minutes  A copy of the telemetry report will be scanned in the patient's chart.    ASSESSMENT:   1. Complex regional pain syndrome type 1 of both upper extremities    2. Migration of spinal cord stimulator, subsequent encounter    3. Battery end of life of spinal cord stimulator    4. Cervical facet arthropathy/cervical spondylosis without myelopathy    5. Adhesive capsulitis of right shoulder    6. Myofascial pain    7. Chronic tension-type headache, not intractable    8. Depression, unspecified depression type    9. Insomnia, unspecified type      PLAN:  I had a lengthy conversation with Nettie regarding her chronic pain condition and potential therapeutic options including risks, benefits, alternative therapies, to name a few. Patient has failed to obtain pain relief with conservative measures, as referenced above. Patient has done exceedingly well with her SCS device until she started having falls, which likely caused migration of the leads. Her IPG is also behaving as end of life and not retaining charge. I have reviewed all available patient's medical records as well as previous therapies as referenced above.  Therefore, I have proposed the following plan:  1.  Referral to Dr. Pack in consultation for revision of her spinal cord stimulator device.  I have recommended Med[employer] Specify 2x8 surgical paddle lead with the top electrodes projecting at the level of the superior endplate of the C3 vertebral body (site of original implantation). Her spinal cord stimulator leads have caudally migrated from C3 down to below C7. Consequently, efforts at reprogramming have been unsuccessful to provide stimulation in her neck, shoulders, and both upper extremities, all areas affected by her CRPS --and previously covered by her SCS device before lead migration-. Also, we need to exchange her IPG to [employer]. Her current  IPG is at the end of battery life (patient reports that she has been charging her IPG every day for about 4-7 hours to achieve a fully charged IPG) and IPG has not been retaining charge for at least the past 3 months).   2. Diagnostics: CT scan of the cervical spine without contrast to assess patency of the cervical epidural space for lead placement  3. Long term rehabilitation efforts:    A. Patient will start comprehensive physical therapy and occupational therapy rehabilitation programs after revision of her SCS device  B. Follow up with Dr. Bowser for cognitive behavioral therapy, biofeedback, and assistance in the development of appropriate coping skills.  C. Referral to [facility] Weight loss  4. Pharmacological measures, as follows;    a. Continue Gralise 600 mg with evening meals.  b. Continue nortriptyline 10-20 mg at bedtime.  c. Continue Zanaflex 2 mg half a tablet to a tablet 4 times a day when necessary muscle spasms.  d. Continue Lidocaine 10%, clonidine 0.1%, baclofen 5%, cyclobenzaprine 4%, amitriptyline 2%, gabapentin 10%, prilocaine 2% gel, apply gel to the right shoulder as needed.  e. Continue Cymbalta  f.  Continue Zipsor 25 mg 4 times a day only when necessary for breakthrough pain. Patient has been instructed again not to take any other NSAIDs.  5. The patient has been instructed to contact my office with any questions or difficulties. The patient understands the plan and agrees to proceed accordingly.      I spent 60 minutes face-to-face with the patient, of which more than 50% of the time were spent counseling regarding evaluation, diagnosis, prognosis, potential referrals, treatment options for chronic pain condition and overall rehabilitation, coordination of care with other providers involved in patient's care, risk and benefits of different interventions and long-term management and functional goals of spinal cord stimulation     Patient Care Team:  Chucho Sumner MD as PCP -  General  Randy Gonzales MD as PCP - Claims Attributed  Gilberto Robles MD as Consulting Physician (Hematology and Oncology)  Kaveh Randle MD as Consulting Physician (Pain Medicine)  Cielo Hernandez MD as Consulting Physician (Neurology)  Nettie Garcia DO as Consulting Physician (Neurology)     No orders of the defined types were placed in this encounter.        No future appointments.      Kaveh Randle MD

## 2018-08-17 NOTE — TELEPHONE ENCOUNTER
Received refill request for Gralise 600 mg. Patient last seen 4/12/17 and has upcoming appointment on 8/23/18. KARY report # [id number] scanned to media.     Per Dr. Randle: ok to refill

## 2018-08-22 ENCOUNTER — TELEPHONE (OUTPATIENT)
Dept: PAIN MEDICINE | Facility: CLINIC | Age: 43
End: 2018-08-22

## 2018-08-23 ENCOUNTER — OFFICE VISIT (OUTPATIENT)
Dept: PAIN MEDICINE | Facility: CLINIC | Age: 43
End: 2018-08-23

## 2018-08-23 VITALS
BODY MASS INDEX: 25.43 KG/M2 | SYSTOLIC BLOOD PRESSURE: 102 MMHG | OXYGEN SATURATION: 99 % | DIASTOLIC BLOOD PRESSURE: 60 MMHG | TEMPERATURE: 98.2 F | HEIGHT: 66 IN | HEART RATE: 82 BPM | WEIGHT: 158.2 LBS | RESPIRATION RATE: 18 BRPM

## 2018-08-23 DIAGNOSIS — G44.229 CHRONIC TENSION-TYPE HEADACHE, NOT INTRACTABLE: ICD-10-CM

## 2018-08-23 DIAGNOSIS — M79.18 MYOFASCIAL PAIN: ICD-10-CM

## 2018-08-23 DIAGNOSIS — G90.513 COMPLEX REGIONAL PAIN SYNDROME TYPE 1 OF BOTH UPPER EXTREMITIES: ICD-10-CM

## 2018-08-23 DIAGNOSIS — F32.A DEPRESSION, UNSPECIFIED DEPRESSION TYPE: ICD-10-CM

## 2018-08-23 DIAGNOSIS — Z45.42 BATTERY END OF LIFE OF SPINAL CORD STIMULATOR: ICD-10-CM

## 2018-08-23 DIAGNOSIS — M47.812 FACET ARTHROPATHY, CERVICAL: ICD-10-CM

## 2018-08-23 DIAGNOSIS — T85.122D MIGRATION OF SPINAL CORD STIMULATOR, SUBSEQUENT ENCOUNTER: ICD-10-CM

## 2018-08-23 DIAGNOSIS — G47.00 INSOMNIA, UNSPECIFIED TYPE: ICD-10-CM

## 2018-08-23 DIAGNOSIS — M75.01 ADHESIVE CAPSULITIS OF RIGHT SHOULDER: ICD-10-CM

## 2018-08-23 PROBLEM — T85.122A MIGRATION OF SPINAL CORD STIMULATOR (HCC): Status: ACTIVE | Noted: 2018-08-23

## 2018-08-23 PROCEDURE — 99215 OFFICE O/P EST HI 40 MIN: CPT | Performed by: ANESTHESIOLOGY

## 2018-08-23 PROCEDURE — 95972 ALYS CPLX SP/PN NPGT W/PRGRM: CPT | Performed by: ANESTHESIOLOGY

## 2018-08-28 ENCOUNTER — HOSPITAL ENCOUNTER (OUTPATIENT)
Dept: CT IMAGING | Facility: HOSPITAL | Age: 43
Discharge: HOME | End: 2018-08-28
Attending: ANESTHESIOLOGY | Admitting: ANESTHESIOLOGY

## 2018-08-28 PROCEDURE — 72125 CT NECK SPINE W/O DYE: CPT

## 2018-09-10 ENCOUNTER — OFFICE VISIT (OUTPATIENT)
Dept: NEUROSURGERY | Facility: CLINIC | Age: 43
End: 2018-09-10

## 2018-09-10 ENCOUNTER — PREP FOR SURGERY (OUTPATIENT)
Dept: OTHER | Facility: HOSPITAL | Age: 43
End: 2018-09-10

## 2018-09-10 VITALS — OXYGEN SATURATION: 99 % | HEIGHT: 66 IN | BODY MASS INDEX: 25.36 KG/M2 | WEIGHT: 157.8 LBS | RESPIRATION RATE: 17 BRPM

## 2018-09-10 DIAGNOSIS — T85.122D MIGRATION OF SPINAL CORD STIMULATOR, SUBSEQUENT ENCOUNTER: Primary | ICD-10-CM

## 2018-09-10 DIAGNOSIS — G90.511 COMPLEX REGIONAL PAIN SYNDROME TYPE 1 OF RIGHT UPPER EXTREMITY: Primary | ICD-10-CM

## 2018-09-10 PROCEDURE — 99203 OFFICE O/P NEW LOW 30 MIN: CPT | Performed by: NEUROLOGICAL SURGERY

## 2018-09-10 RX ORDER — CHLORHEXIDINE GLUCONATE 4 G/100ML
SOLUTION TOPICAL
Qty: 120 ML | Refills: 0 | Status: SHIPPED | OUTPATIENT
Start: 2018-09-10 | End: 2018-10-17 | Stop reason: HOSPADM

## 2018-09-10 RX ORDER — CEFAZOLIN SODIUM 2 G/100ML
2 INJECTION, SOLUTION INTRAVENOUS ONCE
Status: CANCELLED | OUTPATIENT
Start: 2018-09-10 | End: 2018-09-10

## 2018-09-10 RX ORDER — OXYCODONE HCL 10 MG/1
10 TABLET, FILM COATED, EXTENDED RELEASE ORAL ONCE
Status: CANCELLED | OUTPATIENT
Start: 2018-09-10 | End: 2018-09-10

## 2018-09-10 RX ORDER — SODIUM CHLORIDE AND POTASSIUM CHLORIDE 150; 900 MG/100ML; MG/100ML
75 INJECTION, SOLUTION INTRAVENOUS CONTINUOUS
Status: CANCELLED | OUTPATIENT
Start: 2018-09-10

## 2018-09-10 RX ORDER — IBUPROFEN 800 MG/1
800 TABLET ORAL ONCE
Status: CANCELLED | OUTPATIENT
Start: 2018-09-10 | End: 2018-09-10

## 2018-09-10 RX ORDER — FAMOTIDINE 20 MG/1
20 TABLET, FILM COATED ORAL
Status: CANCELLED | OUTPATIENT
Start: 2018-09-10

## 2018-09-10 RX ORDER — ACETAMINOPHEN 500 MG
1000 TABLET ORAL ONCE
Status: CANCELLED | OUTPATIENT
Start: 2018-09-10 | End: 2018-09-10

## 2018-09-10 NOTE — PROGRESS NOTES
NAME: ROSIE JIANG   DOS: 9/10/2018  : 1975  PCP: Chucho Sumner MD    Chief Complaint:  Neck and right upper extremity pain  Chief Complaint   Patient presents with   • Eval for SCS revision       History of Present Illness:  43 y.o. female     This is a 43-year-old female with a history of complex regional pain syndrome.  She's had a history of multiple surgeries in the right upper extremity with resultant pain in the arm and neck the pain radiates down her arm she underwent spinal stimulation percutaneously years ago which seemed to help believes however have migrated and inferior location and she has lost coverage of her right upper extremity.  She denies any weakness that is new or change in her baseline she has relatively classic symptoms of burning tingling pain numbness etc. she denies any symptoms of spasticity      PMHX  Allergies:  No Known Allergies  Medications    Current Outpatient Prescriptions:   •  amLODIPine (NORVASC) 2.5 MG tablet, Take 1 tablet by mouth daily., Disp: , Rfl:   •  buPROPion SR (WELLBUTRIN SR) 150 MG 12 hr tablet, , Disp: , Rfl: 0  •  chlorthalidone (HYGROTEN) 50 MG tablet, Take 1 tablet by mouth daily., Disp: , Rfl:   •  Diclofenac Potassium (ZIPSOR) 25 MG capsule, TAKE 1 CAPSULE FOUR TIMES A DAY AS NEEDED, Disp: 120 capsule, Rfl: 6  •  DULoxetine (CYMBALTA) 60 MG capsule, Take 1 capsule by mouth Daily., Disp: 30 capsule, Rfl: 3  •  furosemide (LASIX) 20 MG tablet, Take 1 tablet by mouth daily., Disp: , Rfl:   •  gabapentin, once-daily, (GRALISE) 600 MG tablet tablet, Take 1 tablet by mouth every night at bedtime., Disp: 30 tablet, Rfl: 5  •  LORazepam (ATIVAN) 1 MG tablet, Take 1 tablet by mouth 2 (two) times a day., Disp: , Rfl:   •  nortriptyline (PAMELOR) 10 MG capsule, Take 1-2 tablets by mouth at bedtime, Disp: 60 capsule, Rfl: 3  •  potassium chloride ER (K-TAB) 20 MEQ tablet controlled-release ER tablet, Take 1 tablet by mouth Daily., Disp: 7 tablet, Rfl: 0  •   predniSONE (DELTASONE) 20 MG tablet, Take  by mouth., Disp: , Rfl:   •  tiZANidine (ZANAFLEX) 2 MG tablet, TAKE 1 TABLET FOUR TIMES A DAY AS NEEDED FOR MUSCLE SPASMS, Disp: 120 tablet, Rfl: 6  •  zolpidem (AMBIEN) 10 MG tablet, TAKE 1 TABLET ONCE A DAY AT BEDTIME, Disp: , Rfl: 0  Past Medical History:  Past Medical History:   Diagnosis Date   • Abnormal MRI, shoulder     Right Shoulder MRI 12/2010 revealed small glenohumeral joint effusion, capsulitis, tendinitis. No rotator cuff for labral tear was noted.    • Abnormal x-ray     X-Ray of the right wrist on 04/30/2014, revealed linear sclerosis within the distal radial metaphysis consistent with healed fracture. There is irregularity of the ulnar styloid consistent with prior fracture.     • Depression    • History of constipation    • History of insomnia     Seconday to pain   • History of migraine headaches    • History of varicose veins    • Pain in joint, shoulder region      Past Surgical History:  Past Surgical History:   Procedure Laterality Date   • BACK SURGERY  2014    SCS implant   • OTHER SURGICAL HISTORY      Left Distal Radiolnar Joint Stabilization and Capsulodesis   • ROTATOR CUFF REPAIR      Rotator cuff repair followed by three surgeries and manipulation under anesthesia with Dr. Ludwig. Fourth surgery performed by Dr. Graves.  Removal of suture and subacromial decompression with lysis of adhesions 1/2011.   • WRIST SURGERY       Social Hx:  Social History   Substance Use Topics   • Smoking status: Never Smoker   • Smokeless tobacco: Never Used   • Alcohol use No     Family Hx:  Family History   Problem Relation Age of Onset   • Diabetes Mother    • Hypertension Mother    • Stroke Mother    • Cancer Father    • Lymphoma Father    • Lung cancer Father    • Brain cancer Father    • Liver cancer Father    • Heart disease Maternal Grandmother      Review of Systems:        Review of Systems   Constitutional: Positive for activity change, diaphoresis and  fatigue. Negative for appetite change, chills, fever and unexpected weight change.   HENT: Negative for congestion, dental problem, drooling, ear discharge, ear pain, facial swelling, hearing loss, mouth sores, nosebleeds, postnasal drip, rhinorrhea, sinus pressure, sneezing, sore throat, tinnitus, trouble swallowing and voice change.    Eyes: Negative for photophobia, pain, discharge, redness, itching and visual disturbance.   Respiratory: Positive for shortness of breath. Negative for apnea, cough, choking, chest tightness, wheezing and stridor.    Cardiovascular: Positive for leg swelling. Negative for chest pain and palpitations.   Gastrointestinal: Positive for constipation. Negative for abdominal distention, abdominal pain, anal bleeding, blood in stool, diarrhea, nausea, rectal pain and vomiting.   Endocrine: Negative for cold intolerance, heat intolerance, polydipsia, polyphagia and polyuria.   Genitourinary: Negative for decreased urine volume, difficulty urinating, dysuria, enuresis, flank pain, frequency, genital sores, hematuria and urgency.   Musculoskeletal: Positive for arthralgias, back pain, myalgias, neck pain and neck stiffness. Negative for gait problem and joint swelling.   Skin: Negative for color change, pallor, rash and wound.   Allergic/Immunologic: Negative for environmental allergies, food allergies and immunocompromised state.   Neurological: Positive for dizziness, tremors, syncope, weakness, light-headedness, numbness and headaches. Negative for seizures, facial asymmetry and speech difficulty.   Hematological: Negative for adenopathy. Does not bruise/bleed easily.   Psychiatric/Behavioral: Positive for agitation, confusion, decreased concentration, dysphoric mood and sleep disturbance. Negative for behavioral problems, hallucinations, self-injury and suicidal ideas. The patient is nervous/anxious. The patient is not hyperactive.    All other systems reviewed and are negative.   Review  of systemsI have reviewed this note template and all pertinent parts of the review of systems social, family history, surgical history and medication list    Physical Examination:  Vitals:    09/10/18 1100   Resp: 17   SpO2: 99%      General Appearance:   Well developed, well nourished, well groomed, alert, and cooperative.  Neurological examination:  Neurologic Exam  Vital signs were reviewed and documented in the chart  Patient appeared in good neurologic function with normal comprehension fluent speech  Mood and affect are normal  Sense of smell deferred  Moderate dentition reasonable range of motion in the neck  Pupils symmetric equally reactive funduscopic exam not visualized   Visual fields intact to confrontation  Extraocular movements intact  Face motor function is symmetric  Facial sensations normal  Hearing intact to finger rub hearing intact to finger rub  Tongue is midline  Palate symmetric  Swallowing normal  Shoulder shrug normal    Muscle bulk and tone normal  5 out of 5 strength no motor drift  Gait normal intact  Negative Romberg  No clonus long tract signs or myelopathy    Reflexes she has mild hyperreflexia in the upper extremities he flexes not tested right upper extremity    No edema noted and extremities skin appears normal    Straight leg raise sign absent  No signs of intrinsic hip dysfunction  Back is without any lesions or abnormality  Arms are warm and well perfused  Vibratory sensations intact her incisions are well-healed    Review of Imaging/DATA:  I reviewed CT scans he has no evidence of severe spinal stenosis there is a spinal stimulator percutaneous lead implanted that is at the cervicothoracic junction T1 T2 C7 area she has no evidence of significant Chiari malformation on her imaging  Diagnoses/Plan:    Ms. Araya is a 43 y.o. female   She is miserable status post losing her coverage with her spinal stimulator is spoken personally with Dr. DR WOODS we both agree that spinal  cord stimulation implantation at the upper cervical region between the C2 3 and 4 areas would be ideal.  A see no evidence of spinal stenosis she's miserable and wishes to proceed X point all the risk of the procedure explained the risks benefits and failure rates including spinal fluid leakage need for cervical laminectomy postsurgical neck pain infections paralysis etc.  I also explained that we may not be able to remove the old leads etc.  And that we would be unable to implant the stimulator.  We'll make arrangements for treatment with posterior cervical laminectomy partial C2, C3-C4 with removal of prior stimulator IPG and implantation of new device.

## 2018-09-11 PROBLEM — G90.511 COMPLEX REGIONAL PAIN SYNDROME TYPE 1 OF RIGHT UPPER EXTREMITY: Status: ACTIVE | Noted: 2018-09-11

## 2018-09-25 ENCOUNTER — APPOINTMENT (OUTPATIENT)
Dept: PREADMISSION TESTING | Facility: HOSPITAL | Age: 43
End: 2018-09-25

## 2018-09-25 VITALS — WEIGHT: 155.42 LBS | HEIGHT: 66 IN | BODY MASS INDEX: 24.98 KG/M2

## 2018-09-25 DIAGNOSIS — G90.511 COMPLEX REGIONAL PAIN SYNDROME TYPE 1 OF RIGHT UPPER EXTREMITY: ICD-10-CM

## 2018-09-25 LAB
ALBUMIN SERPL-MCNC: 4.42 G/DL (ref 3.2–4.8)
ALBUMIN/GLOB SERPL: 2.1 G/DL (ref 1.5–2.5)
ALP SERPL-CCNC: 47 U/L (ref 25–100)
ALT SERPL W P-5'-P-CCNC: 5 U/L (ref 7–40)
ANION GAP SERPL CALCULATED.3IONS-SCNC: 7 MMOL/L (ref 3–11)
AST SERPL-CCNC: 12 U/L (ref 0–33)
BILIRUB SERPL-MCNC: 0.1 MG/DL (ref 0.3–1.2)
BUN BLD-MCNC: 21 MG/DL (ref 9–23)
BUN/CREAT SERPL: 18.8 (ref 7–25)
CALCIUM SPEC-SCNC: 9.6 MG/DL (ref 8.7–10.4)
CHLORIDE SERPL-SCNC: 98 MMOL/L (ref 99–109)
CO2 SERPL-SCNC: 33 MMOL/L (ref 20–31)
CREAT BLD-MCNC: 1.12 MG/DL (ref 0.6–1.3)
DEPRECATED RDW RBC AUTO: 46.7 FL (ref 37–54)
ERYTHROCYTE [DISTWIDTH] IN BLOOD BY AUTOMATED COUNT: 16.8 % (ref 11.3–14.5)
GFR SERPL CREATININE-BSD FRML MDRD: 53 ML/MIN/1.73
GLOBULIN UR ELPH-MCNC: 2.1 GM/DL
GLUCOSE BLD-MCNC: 129 MG/DL (ref 70–100)
HCT VFR BLD AUTO: 36.6 % (ref 34.5–44)
HGB BLD-MCNC: 10.9 G/DL (ref 11.5–15.5)
MCH RBC QN AUTO: 22.5 PG (ref 27–31)
MCHC RBC AUTO-ENTMCNC: 29.8 G/DL (ref 32–36)
MCV RBC AUTO: 75.6 FL (ref 80–99)
PLATELET # BLD AUTO: 365 10*3/MM3 (ref 150–450)
PMV BLD AUTO: 9.3 FL (ref 6–12)
POTASSIUM BLD-SCNC: 3.5 MMOL/L (ref 3.5–5.5)
PROT SERPL-MCNC: 6.5 G/DL (ref 5.7–8.2)
RBC # BLD AUTO: 4.84 10*6/MM3 (ref 3.89–5.14)
SODIUM BLD-SCNC: 138 MMOL/L (ref 132–146)
WBC NRBC COR # BLD: 7.51 10*3/MM3 (ref 3.5–10.8)

## 2018-09-25 PROCEDURE — 85027 COMPLETE CBC AUTOMATED: CPT | Performed by: ANESTHESIOLOGY

## 2018-09-25 PROCEDURE — 36415 COLL VENOUS BLD VENIPUNCTURE: CPT

## 2018-09-25 PROCEDURE — 93005 ELECTROCARDIOGRAM TRACING: CPT

## 2018-09-25 PROCEDURE — 80053 COMPREHEN METABOLIC PANEL: CPT | Performed by: NEUROLOGICAL SURGERY

## 2018-09-25 PROCEDURE — 93010 ELECTROCARDIOGRAM REPORT: CPT | Performed by: INTERNAL MEDICINE

## 2018-09-25 PROCEDURE — 87081 CULTURE SCREEN ONLY: CPT | Performed by: ANESTHESIOLOGY

## 2018-09-25 RX ORDER — OMEPRAZOLE 20 MG/1
20 CAPSULE, DELAYED RELEASE ORAL DAILY
COMMUNITY

## 2018-09-25 NOTE — DISCHARGE INSTRUCTIONS

## 2018-09-25 NOTE — PAT
Patient instructed to remove all jewelry for procedure.  Patient was instructed that if unable to remove jewelry especially rings to request assistance from a jeweler. Explained that if the piece of jewelry could not be removed before arrival to preop that it will be cut off.  Reinforced with patient that all jewelry must be removed for safety reasons that taping a ring was not an option.  Patient verbalized understanding. Patient has daith piercings in both ears, was instructed to research having them removed prior to surgery.  Bactroban and Chlorhexidine Prescription given during PAT visit, as well as written and verbal instructions given to patient during PAT visit.

## 2018-09-27 LAB — MRSA SPEC QL CULT: NORMAL

## 2018-10-04 RX ORDER — DULOXETIN HYDROCHLORIDE 60 MG/1
CAPSULE, DELAYED RELEASE ORAL
Qty: 30 CAPSULE | Refills: 3 | Status: SHIPPED | OUTPATIENT
Start: 2018-10-04 | End: 2018-11-01 | Stop reason: SDUPTHER

## 2018-10-05 RX ORDER — NORTRIPTYLINE HYDROCHLORIDE 10 MG/1
CAPSULE ORAL
Qty: 60 CAPSULE | Refills: 3 | Status: SHIPPED | OUTPATIENT
Start: 2018-10-05 | End: 2018-11-01 | Stop reason: SDUPTHER

## 2018-10-16 ENCOUNTER — ANESTHESIA (OUTPATIENT)
Dept: PERIOP | Facility: HOSPITAL | Age: 43
End: 2018-10-16

## 2018-10-16 ENCOUNTER — APPOINTMENT (OUTPATIENT)
Dept: GENERAL RADIOLOGY | Facility: HOSPITAL | Age: 43
End: 2018-10-16

## 2018-10-16 ENCOUNTER — ANESTHESIA EVENT (OUTPATIENT)
Dept: PERIOP | Facility: HOSPITAL | Age: 43
End: 2018-10-16

## 2018-10-16 ENCOUNTER — HOSPITAL ENCOUNTER (OUTPATIENT)
Facility: HOSPITAL | Age: 43
LOS: 1 days | Discharge: HOME | End: 2018-10-17
Attending: NEUROLOGICAL SURGERY | Admitting: NEUROLOGICAL SURGERY

## 2018-10-16 DIAGNOSIS — G90.511 COMPLEX REGIONAL PAIN SYNDROME TYPE 1 OF RIGHT UPPER EXTREMITY: ICD-10-CM

## 2018-10-16 PROBLEM — G90.519 CPRS 1 (COMPLEX REGIONAL PAIN SYNDROME I) OF UPPER LIMB: Status: ACTIVE | Noted: 2018-10-16

## 2018-10-16 LAB
B-HCG UR QL: NEGATIVE
GLUCOSE BLDC GLUCOMTR-MCNC: 118 MG/DL (ref 70–130)
GLUCOSE BLDC GLUCOMTR-MCNC: 93 MG/DL (ref 70–130)
GLUCOSE BLDC GLUCOMTR-MCNC: 93 MG/DL (ref 70–130)
INTERNAL NEGATIVE CONTROL: NEGATIVE
INTERNAL POSITIVE CONTROL: POSITIVE
Lab: NORMAL
POTASSIUM BLDA-SCNC: 2.99 MMOL/L (ref 3.5–5.3)

## 2018-10-16 PROCEDURE — 76001 HC FLUORO GREATER THAN 1 HOUR: CPT

## 2018-10-16 PROCEDURE — 25010000002 DEXAMETHASONE PER 1 MG: Performed by: NURSE ANESTHETIST, CERTIFIED REGISTERED

## 2018-10-16 PROCEDURE — 63661 REMOVE SPINE ELTRD PERQ ARAY: CPT | Performed by: PHYSICIAN ASSISTANT

## 2018-10-16 PROCEDURE — 25010000002 CEFAZOLIN PER 500 MG: Performed by: NEUROLOGICAL SURGERY

## 2018-10-16 PROCEDURE — 25010000002 FENTANYL CITRATE (PF) 100 MCG/2ML SOLUTION: Performed by: NURSE ANESTHETIST, CERTIFIED REGISTERED

## 2018-10-16 PROCEDURE — C1713 ANCHOR/SCREW BN/BN,TIS/BN: HCPCS | Performed by: NEUROLOGICAL SURGERY

## 2018-10-16 PROCEDURE — 88300 SURGICAL PATH GROSS: CPT | Performed by: NEUROLOGICAL SURGERY

## 2018-10-16 PROCEDURE — 63685 INS/RPLC SPI NPG/RCVR POCKET: CPT | Performed by: PHYSICIAN ASSISTANT

## 2018-10-16 PROCEDURE — 25010000002 HYDROMORPHONE PER 4 MG: Performed by: NEUROLOGICAL SURGERY

## 2018-10-16 PROCEDURE — 25010000002 ONDANSETRON PER 1 MG: Performed by: NURSE ANESTHETIST, CERTIFIED REGISTERED

## 2018-10-16 PROCEDURE — 63655 IMPLANT NEUROELECTRODES: CPT | Performed by: NEUROLOGICAL SURGERY

## 2018-10-16 PROCEDURE — 63655 IMPLANT NEUROELECTRODES: CPT | Performed by: PHYSICIAN ASSISTANT

## 2018-10-16 PROCEDURE — L8689 EXTERNAL RECHARG SYS INTERN: HCPCS | Performed by: NEUROLOGICAL SURGERY

## 2018-10-16 PROCEDURE — 82962 GLUCOSE BLOOD TEST: CPT

## 2018-10-16 PROCEDURE — C1820 GENERATOR NEURO RECHG BAT SY: HCPCS | Performed by: NEUROLOGICAL SURGERY

## 2018-10-16 PROCEDURE — 63661 REMOVE SPINE ELTRD PERQ ARAY: CPT | Performed by: NEUROLOGICAL SURGERY

## 2018-10-16 PROCEDURE — 25010000002 PROPOFOL 1000 MG/ML EMULSION: Performed by: NURSE ANESTHETIST, CERTIFIED REGISTERED

## 2018-10-16 PROCEDURE — C1787 PATIENT PROGR, NEUROSTIM: HCPCS | Performed by: NEUROLOGICAL SURGERY

## 2018-10-16 PROCEDURE — 25010000002 PHENYLEPHRINE PER 1 ML: Performed by: NURSE ANESTHETIST, CERTIFIED REGISTERED

## 2018-10-16 PROCEDURE — 63685 INS/RPLC SPI NPG/RCVR POCKET: CPT | Performed by: NEUROLOGICAL SURGERY

## 2018-10-16 PROCEDURE — 76000 FLUOROSCOPY <1 HR PHYS/QHP: CPT

## 2018-10-16 PROCEDURE — 25010000002 NEOSTIGMINE PER 0.5 MG: Performed by: NURSE ANESTHETIST, CERTIFIED REGISTERED

## 2018-10-16 PROCEDURE — 84132 ASSAY OF SERUM POTASSIUM: CPT | Performed by: ANESTHESIOLOGY

## 2018-10-16 PROCEDURE — 25010000002 PROPOFOL 10 MG/ML EMULSION: Performed by: NURSE ANESTHETIST, CERTIFIED REGISTERED

## 2018-10-16 PROCEDURE — C1778 LEAD, NEUROSTIMULATOR: HCPCS | Performed by: NEUROLOGICAL SURGERY

## 2018-10-16 PROCEDURE — 25010000003 CEFAZOLIN IN DEXTROSE 2-4 GM/100ML-% SOLUTION: Performed by: NEUROLOGICAL SURGERY

## 2018-10-16 PROCEDURE — 81025 URINE PREGNANCY TEST: CPT | Performed by: ANESTHESIOLOGY

## 2018-10-16 DEVICE — NEUROSTM INTELLIS SURESCAN MRI W/ADAPTIVE STIM RECHG: Type: IMPLANTABLE DEVICE | Site: BACK | Status: FUNCTIONAL

## 2018-10-16 DEVICE — ENV ANTIBAC TYRX NEURO ABS LG: Type: IMPLANTABLE DEVICE | Site: BACK | Status: FUNCTIONAL

## 2018-10-16 DEVICE — WAX BONE HEMO AESCULAP 2.5GM: Type: IMPLANTABLE DEVICE | Site: BACK | Status: FUNCTIONAL

## 2018-10-16 DEVICE — IMPLANTABLE DEVICE: Type: IMPLANTABLE DEVICE | Site: BACK | Status: FUNCTIONAL

## 2018-10-16 RX ORDER — FENTANYL CITRATE 50 UG/ML
INJECTION, SOLUTION INTRAMUSCULAR; INTRAVENOUS AS NEEDED
Status: DISCONTINUED | OUTPATIENT
Start: 2018-10-16 | End: 2018-10-16 | Stop reason: SURG

## 2018-10-16 RX ORDER — ONDANSETRON 4 MG/1
4 TABLET, FILM COATED ORAL EVERY 6 HOURS PRN
Status: DISCONTINUED | OUTPATIENT
Start: 2018-10-16 | End: 2018-10-17 | Stop reason: HOSPADM

## 2018-10-16 RX ORDER — ONDANSETRON 2 MG/ML
4 INJECTION INTRAMUSCULAR; INTRAVENOUS ONCE AS NEEDED
Status: DISCONTINUED | OUTPATIENT
Start: 2018-10-16 | End: 2018-10-16 | Stop reason: HOSPADM

## 2018-10-16 RX ORDER — LIDOCAINE HYDROCHLORIDE 10 MG/ML
INJECTION, SOLUTION INFILTRATION; PERINEURAL AS NEEDED
Status: DISCONTINUED | OUTPATIENT
Start: 2018-10-16 | End: 2018-10-16 | Stop reason: SURG

## 2018-10-16 RX ORDER — HYDROMORPHONE HYDROCHLORIDE 1 MG/ML
0.5 INJECTION, SOLUTION INTRAMUSCULAR; INTRAVENOUS; SUBCUTANEOUS
Status: DISCONTINUED | OUTPATIENT
Start: 2018-10-16 | End: 2018-10-16 | Stop reason: HOSPADM

## 2018-10-16 RX ORDER — NALOXONE HCL 0.4 MG/ML
0.4 VIAL (ML) INJECTION
Status: DISCONTINUED | OUTPATIENT
Start: 2018-10-16 | End: 2018-10-17 | Stop reason: HOSPADM

## 2018-10-16 RX ORDER — GABAPENTIN 300 MG/1
600 CAPSULE ORAL EVERY 8 HOURS SCHEDULED
Status: DISCONTINUED | OUTPATIENT
Start: 2018-10-16 | End: 2018-10-17 | Stop reason: HOSPADM

## 2018-10-16 RX ORDER — SODIUM CHLORIDE 0.9 % (FLUSH) 0.9 %
3-10 SYRINGE (ML) INJECTION AS NEEDED
Status: DISCONTINUED | OUTPATIENT
Start: 2018-10-16 | End: 2018-10-17 | Stop reason: HOSPADM

## 2018-10-16 RX ORDER — BUPIVACAINE HYDROCHLORIDE 2.5 MG/ML
INJECTION, SOLUTION EPIDURAL; INFILTRATION; INTRACAUDAL AS NEEDED
Status: DISCONTINUED | OUTPATIENT
Start: 2018-10-16 | End: 2018-10-16 | Stop reason: HOSPADM

## 2018-10-16 RX ORDER — SODIUM CHLORIDE 0.9 % (FLUSH) 0.9 %
3 SYRINGE (ML) INJECTION EVERY 12 HOURS SCHEDULED
Status: DISCONTINUED | OUTPATIENT
Start: 2018-10-16 | End: 2018-10-16 | Stop reason: HOSPADM

## 2018-10-16 RX ORDER — SODIUM CHLORIDE, SODIUM LACTATE, POTASSIUM CHLORIDE, CALCIUM CHLORIDE 600; 310; 30; 20 MG/100ML; MG/100ML; MG/100ML; MG/100ML
9 INJECTION, SOLUTION INTRAVENOUS CONTINUOUS
Status: DISCONTINUED | OUTPATIENT
Start: 2018-10-16 | End: 2018-10-17 | Stop reason: HOSPADM

## 2018-10-16 RX ORDER — DEXAMETHASONE SODIUM PHOSPHATE 4 MG/ML
INJECTION, SOLUTION INTRA-ARTICULAR; INTRALESIONAL; INTRAMUSCULAR; INTRAVENOUS; SOFT TISSUE AS NEEDED
Status: DISCONTINUED | OUTPATIENT
Start: 2018-10-16 | End: 2018-10-16 | Stop reason: SURG

## 2018-10-16 RX ORDER — FAMOTIDINE 10 MG/ML
20 INJECTION, SOLUTION INTRAVENOUS ONCE
Status: CANCELLED | OUTPATIENT
Start: 2018-10-16 | End: 2018-10-16

## 2018-10-16 RX ORDER — AMLODIPINE BESYLATE 5 MG/1
2.5 TABLET ORAL DAILY
Status: DISCONTINUED | OUTPATIENT
Start: 2018-10-17 | End: 2018-10-17 | Stop reason: HOSPADM

## 2018-10-16 RX ORDER — OXYCODONE HCL 10 MG/1
10 TABLET, FILM COATED, EXTENDED RELEASE ORAL ONCE
Status: COMPLETED | OUTPATIENT
Start: 2018-10-16 | End: 2018-10-16

## 2018-10-16 RX ORDER — LIDOCAINE HYDROCHLORIDE AND EPINEPHRINE 5; 5 MG/ML; UG/ML
INJECTION, SOLUTION INFILTRATION; PERINEURAL AS NEEDED
Status: DISCONTINUED | OUTPATIENT
Start: 2018-10-16 | End: 2018-10-16 | Stop reason: HOSPADM

## 2018-10-16 RX ORDER — FENTANYL CITRATE 50 UG/ML
50 INJECTION, SOLUTION INTRAMUSCULAR; INTRAVENOUS
Status: DISCONTINUED | OUTPATIENT
Start: 2018-10-16 | End: 2018-10-16 | Stop reason: HOSPADM

## 2018-10-16 RX ORDER — IBUPROFEN 800 MG/1
800 TABLET ORAL ONCE
Status: COMPLETED | OUTPATIENT
Start: 2018-10-16 | End: 2018-10-16

## 2018-10-16 RX ORDER — CHLORTHALIDONE 25 MG/1
50 TABLET ORAL DAILY
Status: DISCONTINUED | OUTPATIENT
Start: 2018-10-17 | End: 2018-10-17 | Stop reason: HOSPADM

## 2018-10-16 RX ORDER — LORAZEPAM 1 MG/1
1 TABLET ORAL EVERY 8 HOURS PRN
Status: DISCONTINUED | OUTPATIENT
Start: 2018-10-16 | End: 2018-10-17 | Stop reason: HOSPADM

## 2018-10-16 RX ORDER — LIDOCAINE HYDROCHLORIDE 10 MG/ML
0.5 INJECTION, SOLUTION EPIDURAL; INFILTRATION; INTRACAUDAL; PERINEURAL ONCE AS NEEDED
Status: COMPLETED | OUTPATIENT
Start: 2018-10-16 | End: 2018-10-16

## 2018-10-16 RX ORDER — TEMAZEPAM 15 MG/1
15 CAPSULE ORAL NIGHTLY PRN
Status: DISCONTINUED | OUTPATIENT
Start: 2018-10-16 | End: 2018-10-17 | Stop reason: HOSPADM

## 2018-10-16 RX ORDER — FUROSEMIDE 40 MG/1
20 TABLET ORAL DAILY
Status: DISCONTINUED | OUTPATIENT
Start: 2018-10-17 | End: 2018-10-17 | Stop reason: HOSPADM

## 2018-10-16 RX ORDER — NORTRIPTYLINE HYDROCHLORIDE 10 MG/1
10 CAPSULE ORAL NIGHTLY
Status: DISCONTINUED | OUTPATIENT
Start: 2018-10-16 | End: 2018-10-17 | Stop reason: HOSPADM

## 2018-10-16 RX ORDER — ATRACURIUM BESYLATE 10 MG/ML
INJECTION, SOLUTION INTRAVENOUS AS NEEDED
Status: DISCONTINUED | OUTPATIENT
Start: 2018-10-16 | End: 2018-10-16 | Stop reason: SURG

## 2018-10-16 RX ORDER — CEFAZOLIN SODIUM 2 G/100ML
2 INJECTION, SOLUTION INTRAVENOUS ONCE
Status: COMPLETED | OUTPATIENT
Start: 2018-10-16 | End: 2018-10-16

## 2018-10-16 RX ORDER — HYDROMORPHONE HYDROCHLORIDE 1 MG/ML
0.5 INJECTION, SOLUTION INTRAMUSCULAR; INTRAVENOUS; SUBCUTANEOUS
Status: DISCONTINUED | OUTPATIENT
Start: 2018-10-16 | End: 2018-10-17 | Stop reason: HOSPADM

## 2018-10-16 RX ORDER — CEFAZOLIN SODIUM 2 G/100ML
2 INJECTION, SOLUTION INTRAVENOUS EVERY 8 HOURS
Status: COMPLETED | OUTPATIENT
Start: 2018-10-16 | End: 2018-10-17

## 2018-10-16 RX ORDER — TIZANIDINE 4 MG/1
2 TABLET ORAL EVERY 6 HOURS SCHEDULED
Status: DISCONTINUED | OUTPATIENT
Start: 2018-10-16 | End: 2018-10-17 | Stop reason: HOSPADM

## 2018-10-16 RX ORDER — OXYCODONE HYDROCHLORIDE AND ACETAMINOPHEN 5; 325 MG/1; MG/1
2 TABLET ORAL EVERY 4 HOURS PRN
Status: DISCONTINUED | OUTPATIENT
Start: 2018-10-16 | End: 2018-10-17 | Stop reason: HOSPADM

## 2018-10-16 RX ORDER — FAMOTIDINE 20 MG/1
20 TABLET, FILM COATED ORAL ONCE
Status: COMPLETED | OUTPATIENT
Start: 2018-10-16 | End: 2018-10-16

## 2018-10-16 RX ORDER — SODIUM CHLORIDE 0.9 % (FLUSH) 0.9 %
3-10 SYRINGE (ML) INJECTION AS NEEDED
Status: DISCONTINUED | OUTPATIENT
Start: 2018-10-16 | End: 2018-10-16 | Stop reason: HOSPADM

## 2018-10-16 RX ORDER — PROPOFOL 10 MG/ML
VIAL (ML) INTRAVENOUS AS NEEDED
Status: DISCONTINUED | OUTPATIENT
Start: 2018-10-16 | End: 2018-10-16 | Stop reason: SURG

## 2018-10-16 RX ORDER — BUPROPION HYDROCHLORIDE 150 MG/1
150 TABLET, EXTENDED RELEASE ORAL EVERY 12 HOURS SCHEDULED
Status: DISCONTINUED | OUTPATIENT
Start: 2018-10-16 | End: 2018-10-17 | Stop reason: HOSPADM

## 2018-10-16 RX ORDER — GINSENG 100 MG
CAPSULE ORAL AS NEEDED
Status: DISCONTINUED | OUTPATIENT
Start: 2018-10-16 | End: 2018-10-16 | Stop reason: HOSPADM

## 2018-10-16 RX ORDER — SODIUM CHLORIDE 0.9 % (FLUSH) 0.9 %
3 SYRINGE (ML) INJECTION EVERY 12 HOURS SCHEDULED
Status: DISCONTINUED | OUTPATIENT
Start: 2018-10-16 | End: 2018-10-17 | Stop reason: HOSPADM

## 2018-10-16 RX ORDER — ONDANSETRON 2 MG/ML
4 INJECTION INTRAMUSCULAR; INTRAVENOUS EVERY 6 HOURS PRN
Status: DISCONTINUED | OUTPATIENT
Start: 2018-10-16 | End: 2018-10-17 | Stop reason: HOSPADM

## 2018-10-16 RX ORDER — DULOXETIN HYDROCHLORIDE 60 MG/1
60 CAPSULE, DELAYED RELEASE ORAL DAILY
Status: DISCONTINUED | OUTPATIENT
Start: 2018-10-17 | End: 2018-10-17 | Stop reason: HOSPADM

## 2018-10-16 RX ORDER — GLYCOPYRROLATE 0.2 MG/ML
INJECTION INTRAMUSCULAR; INTRAVENOUS AS NEEDED
Status: DISCONTINUED | OUTPATIENT
Start: 2018-10-16 | End: 2018-10-16 | Stop reason: SURG

## 2018-10-16 RX ORDER — ONDANSETRON 2 MG/ML
INJECTION INTRAMUSCULAR; INTRAVENOUS AS NEEDED
Status: DISCONTINUED | OUTPATIENT
Start: 2018-10-16 | End: 2018-10-16 | Stop reason: SURG

## 2018-10-16 RX ORDER — ACETAMINOPHEN 325 MG/1
650 TABLET ORAL EVERY 4 HOURS PRN
Status: DISCONTINUED | OUTPATIENT
Start: 2018-10-16 | End: 2018-10-17 | Stop reason: HOSPADM

## 2018-10-16 RX ORDER — ACETAMINOPHEN 500 MG
1000 TABLET ORAL ONCE
Status: COMPLETED | OUTPATIENT
Start: 2018-10-16 | End: 2018-10-16

## 2018-10-16 RX ADMIN — ATRACURIUM BESYLATE 50 MG: 10 INJECTION, SOLUTION INTRAVENOUS at 11:05

## 2018-10-16 RX ADMIN — PROPOFOL 25 MCG/KG/MIN: 10 INJECTION, EMULSION INTRAVENOUS at 11:10

## 2018-10-16 RX ADMIN — PHENYLEPHRINE HYDROCHLORIDE 80 MCG: 10 INJECTION INTRAVENOUS at 12:08

## 2018-10-16 RX ADMIN — PHENYLEPHRINE HYDROCHLORIDE 80 MCG: 10 INJECTION INTRAVENOUS at 12:54

## 2018-10-16 RX ADMIN — DEXAMETHASONE SODIUM PHOSPHATE 8 MG: 4 INJECTION, SOLUTION INTRAMUSCULAR; INTRAVENOUS at 11:33

## 2018-10-16 RX ADMIN — TIZANIDINE 2 MG: 4 TABLET ORAL at 17:24

## 2018-10-16 RX ADMIN — CEFAZOLIN SODIUM 2 G: 2 INJECTION, SOLUTION INTRAVENOUS at 11:04

## 2018-10-16 RX ADMIN — LIDOCAINE HYDROCHLORIDE 40 MG: 10 INJECTION, SOLUTION INFILTRATION; PERINEURAL at 11:05

## 2018-10-16 RX ADMIN — ATRACURIUM BESYLATE 10 MG: 10 INJECTION, SOLUTION INTRAVENOUS at 12:24

## 2018-10-16 RX ADMIN — IBUPROFEN 800 MG: 800 TABLET ORAL at 08:52

## 2018-10-16 RX ADMIN — ONDANSETRON 4 MG: 2 INJECTION INTRAMUSCULAR; INTRAVENOUS at 13:37

## 2018-10-16 RX ADMIN — Medication 3 MG: at 13:50

## 2018-10-16 RX ADMIN — CEFAZOLIN SODIUM 2 G: 10 INJECTION, POWDER, FOR SOLUTION INTRAVENOUS at 18:30

## 2018-10-16 RX ADMIN — ATRACURIUM BESYLATE 10 MG: 10 INJECTION, SOLUTION INTRAVENOUS at 13:05

## 2018-10-16 RX ADMIN — ATRACURIUM BESYLATE 10 MG: 10 INJECTION, SOLUTION INTRAVENOUS at 11:56

## 2018-10-16 RX ADMIN — PHENYLEPHRINE HYDROCHLORIDE 80 MCG: 10 INJECTION INTRAVENOUS at 12:45

## 2018-10-16 RX ADMIN — PHENYLEPHRINE HYDROCHLORIDE 80 MCG: 10 INJECTION INTRAVENOUS at 12:32

## 2018-10-16 RX ADMIN — SODIUM CHLORIDE, POTASSIUM CHLORIDE, SODIUM LACTATE AND CALCIUM CHLORIDE: 600; 310; 30; 20 INJECTION, SOLUTION INTRAVENOUS at 12:55

## 2018-10-16 RX ADMIN — BUPROPION HYDROCHLORIDE 150 MG: 150 TABLET, EXTENDED RELEASE ORAL at 20:19

## 2018-10-16 RX ADMIN — SODIUM CHLORIDE, POTASSIUM CHLORIDE, SODIUM LACTATE AND CALCIUM CHLORIDE 9 ML/HR: 600; 310; 30; 20 INJECTION, SOLUTION INTRAVENOUS at 08:41

## 2018-10-16 RX ADMIN — LIDOCAINE HYDROCHLORIDE 0.2 ML: 10 INJECTION, SOLUTION EPIDURAL; INFILTRATION; INTRACAUDAL; PERINEURAL at 08:41

## 2018-10-16 RX ADMIN — FAMOTIDINE 20 MG: 20 TABLET ORAL at 08:52

## 2018-10-16 RX ADMIN — OXYCODONE HYDROCHLORIDE 10 MG: 10 TABLET, FILM COATED, EXTENDED RELEASE ORAL at 08:52

## 2018-10-16 RX ADMIN — FENTANYL CITRATE 50 MCG: 50 INJECTION, SOLUTION INTRAMUSCULAR; INTRAVENOUS at 11:05

## 2018-10-16 RX ADMIN — HYDROMORPHONE HYDROCHLORIDE 0.5 MG: 1 INJECTION, SOLUTION INTRAMUSCULAR; INTRAVENOUS; SUBCUTANEOUS at 20:18

## 2018-10-16 RX ADMIN — ACETAMINOPHEN 1000 MG: 500 TABLET, FILM COATED ORAL at 08:52

## 2018-10-16 RX ADMIN — OXYCODONE HYDROCHLORIDE AND ACETAMINOPHEN 2 TABLET: 5; 325 TABLET ORAL at 21:59

## 2018-10-16 RX ADMIN — NORTRIPTYLINE HYDROCHLORIDE 10 MG: 10 CAPSULE ORAL at 20:19

## 2018-10-16 RX ADMIN — FENTANYL CITRATE 50 MCG: 50 INJECTION, SOLUTION INTRAMUSCULAR; INTRAVENOUS at 11:47

## 2018-10-16 RX ADMIN — TIZANIDINE 2 MG: 4 TABLET ORAL at 20:19

## 2018-10-16 RX ADMIN — OXYCODONE HYDROCHLORIDE AND ACETAMINOPHEN 2 TABLET: 5; 325 TABLET ORAL at 17:24

## 2018-10-16 RX ADMIN — GABAPENTIN 600 MG: 300 CAPSULE ORAL at 20:19

## 2018-10-16 RX ADMIN — PROPOFOL 150 MG: 10 INJECTION, EMULSION INTRAVENOUS at 11:05

## 2018-10-16 RX ADMIN — GLYCOPYRROLATE 0.4 MG: 0.2 INJECTION, SOLUTION INTRAMUSCULAR; INTRAVENOUS at 13:50

## 2018-10-16 NOTE — ANESTHESIA PREPROCEDURE EVALUATION
Anesthesia Evaluation     Patient summary reviewed and Nursing notes reviewed   no history of anesthetic complications:  NPO Solid Status: > 8 hours  NPO Liquid Status: > 8 hours           Airway   Mallampati: II  TM distance: >3 FB  Neck ROM: full  No difficulty expected  Dental      Pulmonary - negative pulmonary ROS and normal exam   Cardiovascular - negative cardio ROS and normal exam        Neuro/Psych  (+) headaches, numbness, psychiatric history,     GI/Hepatic/Renal/Endo    (+) obesity, morbid obesity,      Musculoskeletal     Abdominal    Substance History      OB/GYN          Other                        Anesthesia Plan    ASA 2     general     intravenous induction   Anesthetic plan, all risks, benefits, and alternatives have been provided, discussed and informed consent has been obtained with: patient.    Plan discussed with CRNA.

## 2018-10-16 NOTE — H&P
Pre-Op H&P  Nettie Araya  [id number]  1975    Chief complaint: Neck pain into RUE with numbness/tingling/weakness    HPI:    9/10/18:  This is a 43-year-old female with a history of complex regional pain syndrome.  She's had a history of multiple surgeries in the right upper extremity with resultant pain in the arm and neck the pain radiates down her arm she underwent spinal stimulation percutaneously years ago which seemed to help believes however have migrated and inferior location and she has lost coverage of her right upper extremity.  She denies any weakness that is new or change in her baseline she has relatively classic symptoms of burning tingling pain numbness etc. she denies any symptoms of spasticity.    10/16/18:  Here today for CERVICAL LAMINECTOMY, C3 PARIAL C2, POSSIBLE C4 5, WITH REMOVAL AND REPLACEMENT SPINAL CORD STIMULATOR, REMOVAL AND REPLACEMENT SPINAL CORD STIMULATOR    Review of Systems:  General ROS: negative for chills, fever or skin lesions;  No changes since last office visit  Cardiovascular ROS: no chest pain or dyspnea on exertion  Respiratory ROS: no cough, shortness of breath, or wheezing    Allergies: No Known Allergies    Home Meds:    No current facility-administered medications on file prior to encounter.      Current Outpatient Prescriptions on File Prior to Encounter   Medication Sig Dispense Refill   • amLODIPine (NORVASC) 2.5 MG tablet Take 2.5 mg by mouth Daily.     • buPROPion SR (WELLBUTRIN SR) 150 MG 12 hr tablet Take 150 mg by mouth Every 12 (Twelve) Hours.  0   • chlorhexidine (HIBICLENS) 4 % external liquid Shower each day with solution for 5 days beginning 5 days before surgery. 120 mL 0   • chlorthalidone (HYGROTEN) 50 MG tablet Take 1 tablet by mouth daily.     • Diclofenac Potassium (ZIPSOR) 25 MG capsule TAKE 1 CAPSULE FOUR TIMES A DAY AS NEEDED (Patient taking differently: Take 1 capsule by mouth 4 (Four) Times a Day As Needed. TAKE 1 CAPSULE FOUR TIMES A  DAY AS NEEDED) 120 capsule 6   • furosemide (LASIX) 20 MG tablet Take 1 tablet by mouth daily.     • gabapentin, once-daily, (GRALISE) 600 MG tablet tablet Take 1 tablet by mouth every night at bedtime. 30 tablet 5   • LORazepam (ATIVAN) 1 MG tablet Take 1 tablet by mouth Every 8 (Eight) Hours As Needed for Anxiety.     • potassium chloride ER (K-TAB) 20 MEQ tablet controlled-release ER tablet Take 1 tablet by mouth Daily. (Patient taking differently: Take 40 mEq by mouth Daily.) 7 tablet 0   • tiZANidine (ZANAFLEX) 2 MG tablet TAKE 1 TABLET FOUR TIMES A DAY AS NEEDED FOR MUSCLE SPASMS (Patient taking differently: Take 2 mg by mouth Every 6 (Six) Hours As Needed. TAKE 1 TABLET FOUR TIMES A DAY AS NEEDED FOR MUSCLE SPASMS) 120 tablet 6   • zolpidem (AMBIEN) 10 MG tablet TAKE 1 TABLET ONCE A DAY AT BEDTIME  0   • [DISCONTINUED] predniSONE (DELTASONE) 20 MG tablet Take  by mouth.         PMH:   Past Medical History:   Diagnosis Date   • Abnormal MRI, shoulder     Right Shoulder MRI 12/2010 revealed small glenohumeral joint effusion, capsulitis, tendinitis. No rotator cuff for labral tear was noted.    • Abnormal x-ray     X-Ray of the right wrist on 04/30/2014, revealed linear sclerosis within the distal radial metaphysis consistent with healed fracture. There is irregularity of the ulnar styloid consistent with prior fracture.     • Anxiety    • Depression    • History of constipation    • History of insomnia     Seconday to pain   • History of migraine headaches    • History of varicose veins    • Pain in joint, shoulder region      PSH:    Past Surgical History:   Procedure Laterality Date   • BACK SURGERY  2014    SCS implant   • COLONOSCOPY     • OTHER SURGICAL HISTORY      Left Distal Radiolnar Joint Stabilization and Capsulodesis   • ROTATOR CUFF REPAIR      Rotator cuff repair followed by three surgeries and manipulation under anesthesia with Dr. Ludwig. Fourth surgery performed by Dr. Graves.  Removal of suture  "and subacromial decompression with lysis of adhesions 1/2011.   • SPINAL CORD STIMULATOR IMPLANT     • WRIST SURGERY       Social History:   Tobacco:   History   Smoking Status   • Never Smoker   Smokeless Tobacco   • Never Used      Alcohol:     History   Alcohol Use No       Vitals:           /72 (BP Location: Right arm, Patient Position: Lying)   Pulse 82   Temp 97.7 °F (36.5 °C) (Temporal Artery )   Resp 18   Ht 167.6 cm (66\")   Wt 70.3 kg (155 lb)   LMP 09/24/2018 (Within Days) Comment: Mammogram FEB 2018   SpO2 100%   BMI 25.02 kg/m²     Physical Exam:  General Appearance:    Alert, cooperative, no distress, appears stated age   Head:    Normocephalic, without obvious abnormality, atraumatic   Lungs:     Clear to auscultation bilaterally, respirations unlabored    Heart:   Regular rate and rhythm, S1 and S2 normal, no murmur, rub    or gallop    Abdomen:    Soft, non-tender.  +bowel sounds   Breast Exam:    deferred   Genitalia:    deferred   Extremities:   Extremities normal, atraumatic, no cyanosis or edema   Skin:   Skin color, texture, turgor normal, no rashes or lesions   Neurologic:   Grossly intact   Results Review  I reviewed the patient's new clinical results.    Cancer Staging (if applicable)  Cancer Patient: __ yes _x_no __unknown; If yes, clinical stage T:__ N:__M:__, stage group or __N/A    Impression/Plan:    Ms. Araya is a 43 y.o. female   She is miserable status post losing her coverage with her spinal stimulator is spoken personally with Dr. DR WOODS we both agree that spinal cord stimulation implantation at the upper cervical region between the C2 3 and 4 areas would be ideal.  A see no evidence of spinal stenosis she's miserable and wishes to proceed X point all the risk of the procedure explained the risks benefits and failure rates including spinal fluid leakage need for cervical laminectomy postsurgical neck pain infections paralysis etc.  I also explained that we may " not be able to remove the old leads etc.  And that we would be unable to implant the stimulator.  We'll make arrangements for treatment with posterior cervical laminectomy partial C2, C3-C4 with removal of prior stimulator IPG and implantation of new device.    Graciela Prasad, LAURO   10/16/2018   9:19 AM

## 2018-10-16 NOTE — ANESTHESIA POSTPROCEDURE EVALUATION
Patient: Nettie Araya    Procedure Summary     Date:  10/16/18 [facility]    Anesthesia Start:  1058 Anesthesia Stop:  1413    Procedures:       PARTIAL C2, C3, C4 LAMINECTOMY SPINAL CORD STIMULATOR REMOVAL INSERTION OF SPINAL CORD STIMULATOR (Bilateral Spine Cervical)      REMOVAL AND REPLACEMENT SPINAL CORD STIMULATOR (N/A Back) Diagnosis:       Complex regional pain syndrome type 1 of right upper extremity      (Complex regional pain syndrome type 1 of right upper extremity [G90.511])    Surgeon:  Jam Pack MD Provider:  Randy Rodriguez MD    Anesthesia Type:  general ASA Status:  2          Anesthesia Type: general  Last vitals  BP   109/48   Temp   97.3   Pulse   76   Resp   12     SpO2   100%     Post Anesthesia Care and Evaluation    Patient location during evaluation: PACU  Patient participation: complete - patient participated  Level of consciousness: sleepy but conscious  Pain score: 0  Pain management: adequate  Airway patency: patent  Anesthetic complications: No anesthetic complications  PONV Status: none  Cardiovascular status: hemodynamically stable and acceptable  Respiratory status: nonlabored ventilation, acceptable and nasal cannula  Hydration status: acceptable

## 2018-10-16 NOTE — PLAN OF CARE
Problem: Patient Care Overview  Goal: Plan of Care Review  Outcome: Ongoing (interventions implemented as appropriate)   10/16/18 1746   Coping/Psychosocial   Plan of Care Reviewed With patient   Plan of Care Review   Progress no change   OTHER   Outcome Summary Pt from PACU, alert. Posterior neck and flank dressings CDI. Velásquez removed. PRN PO meds given. Ice pack to neck. VSS        Problem: Laminectomy/Foraminotomy/Discectomy (Adult)  Goal: Signs and Symptoms of Listed Potential Problems Will be Absent, Minimized or Managed (Laminectomy/Foraminotomy/Discectomy)  Outcome: Ongoing (interventions implemented as appropriate)      Problem: Fall Risk (Adult)  Goal: Identify Related Risk Factors and Signs and Symptoms  Outcome: Ongoing (interventions implemented as appropriate)   10/16/18 1746   Fall Risk (Adult)   Related Risk Factors (Fall Risk) gait/mobility problems     Goal: Absence of Fall  Outcome: Ongoing (interventions implemented as appropriate)   10/16/18 1746   Fall Risk (Adult)   Absence of Fall making progress toward outcome

## 2018-10-16 NOTE — OP NOTE
NEUROSURGICAL OPERATIVE NOTE        PREOPERATIVE DIAGNOSIS:    Dysesthetic pain syndrome, prior spinal cord stimulator         POSTOPERATIVE DIAGNOSIS:  Same      PROCEDURE:  1.  Explantation of prior percutaneous spinal cord stimulator with explantation of IPG    2.  Partial cervical laminectomy C2-C3 complete laminectomy C4    3.  Implantation of [employer] specify sure scan MRI compatible 2x8 lead  neuro stim intellis sure scan MRI with adaptive stem recharge      SURGEON:  Jam Pack M.D.      ASSISTANT: Arthur Cornejo PA-C      ANESTHESIA:  General      ESTIMATED BLOOD LOSS:  Minimal      SPECIMEN:  Sent to pathology explained leads and IPG      DRAINS:  None      COMPLICATIONS:  None apparent          PROCEDURE IN DETAIL:  The patient has a history of intractable dysesthetic pain she wished to have revision new    The patient was taken to the operating room after being informed of the risk and benefits placed in [place] 3 point head fixation and prepped and draped in the usual sterile manner at this point time all the prior incisions were outlined local infiltration was performed with lidocaine and epinephrine and attention was first turned to removing the leads the small thoracic incision was made no Bovie cautery was used and the stems were evil easily removed at this point time the IPG was removed as well and the leads were explained along with the IPG.    Attention was then turned to the cervical incision this was made down to the paraspinal musculature the C2 3 and 4 lamina were exposed lateral fluoroscopic guidance was confirmed a C4 laminectomy was performed with undercutting of C3F8 hemilaminectomy there as well as ligament tech to me between C1 2 and C2 3 the dorsal aspect of the dura did have some granulomatous tissue on it from prior lead placement careful dissection allowed placement of the 2 x 8 paddle lead over the dorsal portion to the back of the C1 arch it anatomically aligned with the  spinous processes AP and lateral fluoroscopy verified the correct levels this was affixed with anchoring system and then tunneled to the IPG pocket where the neuro stim generator was placed intraoperative interrogation was performed and then the incisions were closed in layers after meticulous hemostasis        Jam JOHNSON

## 2018-10-16 NOTE — ANESTHESIA PROCEDURE NOTES
Airway  Urgency: elective    Airway not difficult    General Information and Staff    Patient location during procedure: OR  CRNA: AKILA RIOS    Indications and Patient Condition  Indications for airway management: airway protection    Preoxygenated: yes  MILS not maintained throughout  Mask difficulty assessment: 1 - vent by mask    Final Airway Details  Final airway type: endotracheal airway      Successful airway: ETT  Cuffed: yes   Successful intubation technique: direct laryngoscopy  Endotracheal tube insertion site: oral  Blade: Eliana  Blade size: 3  ETT size: 7.0 mm  Cormack-Lehane Classification: grade I - full view of glottis  Placement verified by: chest auscultation and capnometry   Measured from: lips  ETT to lips (cm): 21  Number of attempts at approach: 1    Additional Comments  Negative epigastric sounds, Breath sound equal bilaterally with symmetric chest rise and fall

## 2018-10-17 VITALS
HEART RATE: 70 BPM | DIASTOLIC BLOOD PRESSURE: 56 MMHG | WEIGHT: 155 LBS | OXYGEN SATURATION: 98 % | BODY MASS INDEX: 24.91 KG/M2 | HEIGHT: 66 IN | SYSTOLIC BLOOD PRESSURE: 109 MMHG | TEMPERATURE: 96.2 F | RESPIRATION RATE: 18 BRPM

## 2018-10-17 LAB
LAB AP CASE REPORT: NORMAL
LAB AP CLINICAL INFORMATION: NORMAL
PATH REPORT.FINAL DX SPEC: NORMAL
PATH REPORT.GROSS SPEC: NORMAL

## 2018-10-17 PROCEDURE — 99024 POSTOP FOLLOW-UP VISIT: CPT | Performed by: NEUROLOGICAL SURGERY

## 2018-10-17 PROCEDURE — 25010000002 HYDROMORPHONE PER 4 MG: Performed by: NEUROLOGICAL SURGERY

## 2018-10-17 PROCEDURE — 25010000002 CEFAZOLIN PER 500 MG: Performed by: NEUROLOGICAL SURGERY

## 2018-10-17 RX ORDER — OXYCODONE AND ACETAMINOPHEN 7.5; 325 MG/1; MG/1
1 TABLET ORAL EVERY 4 HOURS PRN
Qty: 30 TABLET | Refills: 0 | Status: SHIPPED | OUTPATIENT
Start: 2018-10-17 | End: 2018-10-25 | Stop reason: SDUPTHER

## 2018-10-17 RX ADMIN — CHLORTHALIDONE 50 MG: 25 TABLET ORAL at 08:02

## 2018-10-17 RX ADMIN — AMLODIPINE BESYLATE 2.5 MG: 5 TABLET ORAL at 08:02

## 2018-10-17 RX ADMIN — TIZANIDINE 2 MG: 4 TABLET ORAL at 05:43

## 2018-10-17 RX ADMIN — OXYCODONE HYDROCHLORIDE AND ACETAMINOPHEN 2 TABLET: 5; 325 TABLET ORAL at 07:39

## 2018-10-17 RX ADMIN — HYDROMORPHONE HYDROCHLORIDE 0.5 MG: 1 INJECTION, SOLUTION INTRAMUSCULAR; INTRAVENOUS; SUBCUTANEOUS at 05:43

## 2018-10-17 RX ADMIN — FUROSEMIDE 20 MG: 40 TABLET ORAL at 08:02

## 2018-10-17 RX ADMIN — GABAPENTIN 600 MG: 300 CAPSULE ORAL at 05:43

## 2018-10-17 RX ADMIN — HYDROMORPHONE HYDROCHLORIDE 0.5 MG: 1 INJECTION, SOLUTION INTRAMUSCULAR; INTRAVENOUS; SUBCUTANEOUS at 00:31

## 2018-10-17 RX ADMIN — HYDROMORPHONE HYDROCHLORIDE 0.5 MG: 1 INJECTION, SOLUTION INTRAMUSCULAR; INTRAVENOUS; SUBCUTANEOUS at 03:27

## 2018-10-17 RX ADMIN — CEFAZOLIN SODIUM 2 G: 10 INJECTION, POWDER, FOR SOLUTION INTRAVENOUS at 03:22

## 2018-10-17 RX ADMIN — BUPROPION HYDROCHLORIDE 150 MG: 150 TABLET, EXTENDED RELEASE ORAL at 08:02

## 2018-10-17 RX ADMIN — OXYCODONE HYDROCHLORIDE AND ACETAMINOPHEN 2 TABLET: 5; 325 TABLET ORAL at 03:22

## 2018-10-17 RX ADMIN — Medication 3 ML: at 08:04

## 2018-10-17 RX ADMIN — DULOXETINE HYDROCHLORIDE 60 MG: 60 CAPSULE, DELAYED RELEASE ORAL at 08:02

## 2018-10-17 NOTE — PROGRESS NOTES
Discharge Planning Assessment  [facility]     Patient Name: Nettie Araya  MRN: [id number]  Today's Date: 10/17/2018    Admit Date: 10/16/2018          Discharge Needs Assessment     Row Name 10/17/18 0928       Living Environment    Lives With spouse;child(lavell), adult    Current Living Arrangements home/apartment/condo    Primary Care Provided by self    Provides Primary Care For no one    Family Caregiver if Needed spouse;child(lavell), adult    Quality of Family Relationships helpful;involved    Able to Return to Prior Arrangements yes       Resource/Environmental Concerns    Resource/Environmental Concerns none       Transition Planning    Patient/Family Anticipates Transition to home with family    Patient/Family Anticipated Services at Transition none    Transportation Anticipated family or friend will provide       Discharge Needs Assessment    Readmission Within the Last 30 Days no previous admission in last 30 days    Concerns to be Addressed no discharge needs identified;denies needs/concerns at this time    Equipment Currently Used at Home none    Anticipated Changes Related to Illness none    Equipment Needed After Discharge none            Discharge Plan     Row Name 10/17/18 0929       Plan    Plan home    Patient/Family in Agreement with Plan yes    Plan Comments Pt lives in [place] with  and adult daughter. She was independent prior to admit and denies use of any DME/HH. Pt is followed by her PCP and her medications are covered by insurance. Her goal for discharge is to return home and she denies all discharge needs,    Final Discharge Disposition Code 01 - home or self-care    Final Note home        Destination     No service coordination in this encounter.      Durable Medical Equipment     No service coordination in this encounter.      Dialysis/Infusion     No service coordination in this encounter.      Home Medical Care     No service coordination in this encounter.      Social  Care     No service coordination in this encounter.        Expected Discharge Date and Time     Expected Discharge Date Expected Discharge Time    Oct 17, 2018               Demographic Summary     Row Name 10/17/18 0927       General Information    Admission Type observation    Referral Source physician    Reason for Consult discharge planning    General Information Comments PCP Chucho Sumner       Contact Information    Permission Granted to Share Info With ;family/designee    Contact Information Comments Donte Araya [phone]            Functional Status     Row Name 10/17/18 0928       Functional Status, IADL    Medications independent    Meal Preparation independent    Housekeeping independent    Laundry independent    Shopping independent       Mental Status    General Appearance WDL WDL       Mental Status Summary    Recent Changes in Mental Status/Cognitive Functioning no changes            Psychosocial    No documentation.           Abuse/Neglect    No documentation.           Legal    No documentation.           Substance Abuse    No documentation.           Patient Forms    No documentation.         Sue Blackburn, RN

## 2018-10-17 NOTE — PROGRESS NOTES
Case Management Discharge Note    Final Note: home    Destination     No service has been selected for the patient.      Durable Medical Equipment     No service has been selected for the patient.      Dialysis/Infusion     No service has been selected for the patient.      Home Medical Care     No service has been selected for the patient.      Social Care     No service has been selected for the patient.             Final Discharge Disposition Code: 01 - home or self-care

## 2018-10-25 DIAGNOSIS — Z98.890 H/O EXCISION OF LAMINA OF CERVICAL VERTEBRA FOR DECOMPRESSION OF SPINAL CORD: Primary | ICD-10-CM

## 2018-10-25 DIAGNOSIS — G89.18 POST-OP PAIN: ICD-10-CM

## 2018-10-25 RX ORDER — OXYCODONE AND ACETAMINOPHEN 7.5; 325 MG/1; MG/1
1 TABLET ORAL EVERY 4 HOURS PRN
Qty: 30 TABLET | Refills: 0 | Status: SHIPPED | OUTPATIENT
Start: 2018-10-25 | End: 2019-05-22

## 2018-10-25 NOTE — TELEPHONE ENCOUNTER
Provider:  Ramone  Caller: pt  Time of call:   3:48  Phone #:  [phone]  Surgery:  PARTIAL C2, C3, C4 LAMINECTOMY SPINAL CORD STIMULATOR REMOVAL INSERTION OF SPINAL CORD STIMULATOR  Surgery Date:  10/16/2018  Last visit:   sx  Next visit: 10/1/2018    KARY:         10/04/2018 Lorazepam 1MG 1975 90 30 JOHN HELMS [employer] [place] 1    10/17/2018 Oxycodone/Acetaminophen  325MG/7.5MG  1975 30 5 CLARE ALONSO  [employer] [place] 68 1    10/22/2018 Gralise 600MG 1975 30 30 OLAYINKA WOODS [employer] [place] 1    Reason for call:         RF request for Percocet 7.5-325mg.  Pt states she is still experiencing neck pain.  Attempted to call pt back to get more details about her symptoms but she was unavailable.  LVM for her to call us back.

## 2018-10-26 PROBLEM — Z45.42 BATTERY END OF LIFE OF SPINAL CORD STIMULATOR: Status: RESOLVED | Noted: 2018-08-23 | Resolved: 2018-10-26

## 2018-10-26 PROBLEM — Z46.2 ENCOUNTER FOR FITTING AND ADJUSTMENT OF NEUROPACEMAKER OF SPINAL CORD: Status: ACTIVE | Noted: 2018-08-23

## 2018-10-26 PROBLEM — T85.122A MIGRATION OF SPINAL CORD STIMULATOR (HCC): Status: RESOLVED | Noted: 2018-08-23 | Resolved: 2018-10-26

## 2018-10-26 NOTE — TELEPHONE ENCOUNTER
Ramone cosme wrote an RX for percocet 5mg 1 po TID-     -I have called pt to inform and to discuss her pain, no answer, LVM.    -we will await a return call from pt.     -RX in CMA box for now. (I have already made a copy for her chart of the hand written RX.)

## 2018-11-01 ENCOUNTER — OFFICE VISIT (OUTPATIENT)
Dept: PAIN MEDICINE | Facility: CLINIC | Age: 43
End: 2018-11-01

## 2018-11-01 ENCOUNTER — OFFICE VISIT (OUTPATIENT)
Dept: NEUROSURGERY | Facility: CLINIC | Age: 43
End: 2018-11-01

## 2018-11-01 VITALS
HEIGHT: 66 IN | SYSTOLIC BLOOD PRESSURE: 122 MMHG | TEMPERATURE: 97.2 F | BODY MASS INDEX: 24.68 KG/M2 | OXYGEN SATURATION: 97 % | WEIGHT: 153.6 LBS | RESPIRATION RATE: 18 BRPM | HEART RATE: 77 BPM | DIASTOLIC BLOOD PRESSURE: 70 MMHG

## 2018-11-01 VITALS
HEIGHT: 66 IN | BODY MASS INDEX: 24.75 KG/M2 | TEMPERATURE: 97.8 F | WEIGHT: 154 LBS | SYSTOLIC BLOOD PRESSURE: 84 MMHG | DIASTOLIC BLOOD PRESSURE: 56 MMHG

## 2018-11-01 DIAGNOSIS — R53.81 PHYSICAL DECONDITIONING: ICD-10-CM

## 2018-11-01 DIAGNOSIS — G47.00 INSOMNIA, UNSPECIFIED TYPE: ICD-10-CM

## 2018-11-01 DIAGNOSIS — Z46.2 ENCOUNTER FOR FITTING AND ADJUSTMENT OF NEUROPACEMAKER OF SPINAL CORD: ICD-10-CM

## 2018-11-01 DIAGNOSIS — G43.009 MIGRAINE WITHOUT AURA AND WITHOUT STATUS MIGRAINOSUS, NOT INTRACTABLE: ICD-10-CM

## 2018-11-01 DIAGNOSIS — M62.50 ATROPHY, MUSCLE DISUSE: ICD-10-CM

## 2018-11-01 DIAGNOSIS — G90.519 COMPLEX REGIONAL PAIN SYNDROME TYPE 1 OF UPPER EXTREMITY, UNSPECIFIED LATERALITY: Primary | ICD-10-CM

## 2018-11-01 DIAGNOSIS — F32.A DEPRESSION, UNSPECIFIED DEPRESSION TYPE: ICD-10-CM

## 2018-11-01 DIAGNOSIS — G44.229 CHRONIC TENSION-TYPE HEADACHE, NOT INTRACTABLE: ICD-10-CM

## 2018-11-01 DIAGNOSIS — M47.812 ARTHROPATHY OF CERVICAL FACET JOINT: ICD-10-CM

## 2018-11-01 DIAGNOSIS — M47.812 FACET ARTHROPATHY, CERVICAL: ICD-10-CM

## 2018-11-01 DIAGNOSIS — M47.812 FACET ARTHROPATHY, CERVICAL: Primary | ICD-10-CM

## 2018-11-01 DIAGNOSIS — M79.18 MYOFASCIAL PAIN: ICD-10-CM

## 2018-11-01 DIAGNOSIS — E66.9 MILD OBESITY: Primary | ICD-10-CM

## 2018-11-01 DIAGNOSIS — G90.519 COMPLEX REGIONAL PAIN SYNDROME TYPE 1 OF UPPER EXTREMITY, UNSPECIFIED LATERALITY: ICD-10-CM

## 2018-11-01 DIAGNOSIS — M62.838 MUSCLE SPASM: ICD-10-CM

## 2018-11-01 DIAGNOSIS — M75.01 ADHESIVE CAPSULITIS OF RIGHT SHOULDER: ICD-10-CM

## 2018-11-01 PROCEDURE — 99024 POSTOP FOLLOW-UP VISIT: CPT | Performed by: PHYSICIAN ASSISTANT

## 2018-11-01 PROCEDURE — 99214 OFFICE O/P EST MOD 30 MIN: CPT | Performed by: ANESTHESIOLOGY

## 2018-11-01 PROCEDURE — 95970 ALYS NPGT W/O PRGRMG: CPT | Performed by: ANESTHESIOLOGY

## 2018-11-01 RX ORDER — DULOXETIN HYDROCHLORIDE 60 MG/1
60 CAPSULE, DELAYED RELEASE ORAL DAILY
Qty: 30 CAPSULE | Refills: 5 | Status: SHIPPED | OUTPATIENT
Start: 2018-11-01 | End: 2019-08-07 | Stop reason: SDUPTHER

## 2018-11-01 RX ORDER — NORTRIPTYLINE HYDROCHLORIDE 10 MG/1
CAPSULE ORAL
Qty: 60 CAPSULE | Refills: 5 | Status: SHIPPED | OUTPATIENT
Start: 2018-11-01 | End: 2019-08-07 | Stop reason: SDUPTHER

## 2018-11-01 RX ORDER — TIZANIDINE 2 MG/1
TABLET ORAL
Qty: 120 TABLET | Refills: 5 | Status: SHIPPED | OUTPATIENT
Start: 2018-11-01 | End: 2019-10-15 | Stop reason: SDUPTHER

## 2018-11-01 RX ORDER — DICLOFENAC POTASSIUM 25 MG/1
CAPSULE, LIQUID FILLED ORAL
Qty: 120 CAPSULE | Refills: 5 | Status: SHIPPED | OUTPATIENT
Start: 2018-11-01 | End: 2019-06-19 | Stop reason: SDUPTHER

## 2018-11-01 NOTE — PROGRESS NOTES
Subjective   Patient ID: Nettie Araya is a 43 y.o. female is here today for follow-up for wound check.    HPI:      Patient is very sweet 43-year-old female who has had an unfortunate history of having cervical spinal cord stimulators migrate into her thoracic spine.  Patient underwent a exploratory surgery to remove all stimulators as well as to replace with a new permanent stimulator.    Procedure was performed on 10/16/2018.  Patient is here for suture removal and wound checks.    Vision posterior cervical spine was prepped medial well approximated no signs of infection bleeding or erythema.  Patient is to back/flank incisions are well-healed well approximated no signs of infection bleeding or erythema.  However the did have quite a bit of tape and adhesive product around the incisions which I've removed with the Detachal.     Patient states the spinal cord stimulator is somewhat life-changing.  Patient states that she feels very good and is very pleased with postsurgical outcome.      Is following up with Dr. Randle directly after her visit to meet with the rep to hopefully regla in the spinal cord stem, however it does not look like much of an alteration will be needed.    The following portions of the patient's history were reviewed and updated as appropriate: allergies, current medications, past family history, past medical history, past social history, past surgical history and problem list.    Review of Systems   Constitutional: Positive for activity change, diaphoresis and fatigue. Negative for appetite change, chills, fever and unexpected weight change.   HENT: Negative for congestion, dental problem, drooling, ear discharge, ear pain, facial swelling, hearing loss, mouth sores, nosebleeds, postnasal drip, rhinorrhea, sinus pressure, sneezing, sore throat, tinnitus, trouble swallowing and voice change.    Eyes: Negative for photophobia, pain, discharge, redness, itching and visual disturbance.  "  Respiratory: Positive for shortness of breath. Negative for apnea, cough, choking, chest tightness, wheezing and stridor.    Cardiovascular: Positive for leg swelling. Negative for chest pain and palpitations.   Gastrointestinal: Positive for constipation. Negative for abdominal distention, abdominal pain, anal bleeding, blood in stool, diarrhea, nausea, rectal pain and vomiting.   Endocrine: Negative for cold intolerance, heat intolerance, polydipsia, polyphagia and polyuria.   Genitourinary: Negative for decreased urine volume, difficulty urinating, dysuria, enuresis, flank pain, frequency, genital sores, hematuria and urgency.   Musculoskeletal: Positive for arthralgias, back pain, myalgias, neck pain and neck stiffness. Negative for gait problem and joint swelling.   Skin: Negative for color change, pallor, rash and wound.   Allergic/Immunologic: Negative for environmental allergies, food allergies and immunocompromised state.   Neurological: Positive for dizziness, tremors, syncope, weakness, light-headedness, numbness and headaches. Negative for seizures, facial asymmetry and speech difficulty.   Hematological: Negative for adenopathy. Does not bruise/bleed easily.   Psychiatric/Behavioral: Positive for agitation, confusion, decreased concentration, dysphoric mood and sleep disturbance. Negative for behavioral problems, hallucinations, self-injury and suicidal ideas. The patient is nervous/anxious. The patient is not hyperactive.    All other systems reviewed and are negative.        Objective    reports that she has never smoked. She has never used smokeless tobacco. She reports that she does not drink alcohol or use drugs.   SMOKING STATUS: Nonsmoker    Physical Exam:   Vitals:BP (!) 84/56 (BP Location: Left arm, Patient Position: Sitting, Cuff Size: Adult)   Temp 97.8 °F (36.6 °C) (Temporal Artery )   Ht 167.6 cm (66\")   Wt 69.9 kg (154 lb)   BMI 24.86 kg/m²    BMI: Body mass index is 24.86 " kg/m².     GENERAL:   The patient is in no acute distress, and is able to answer all questions appropriately.  Skin:  The incisions are well-healed and well approximated.  No signs of infection, bleeding, or erythema.  Musculoskeletal: The patient’s strength is intact in upper and lower extremities upon direct testing.  Strength is 5 out of 5.  Shoulder abduction is 5 out of 5.  Finger extension is 5 out of 5.  Dorsiflexion and plantarflexion are equal bilaterally @ 5/5. Hip flexion against resistance.  The patient’s gait is normal without antalgia.  Neurologic: The patient is alert and oriented by 3.  Recent memory, language, attention span, and fund of knowledge are all with within normal limits.   Sensation is equal bilaterally with no deficit.    Reflexes are 2+ at the biceps, triceps, and brachioradialis as well as the patellar and Achilles tendon bilaterally.  There is no sign of Downs’s, clonus, or long track signs.      Assessment/Plan   Independent Review of Radiographic Studies:    No new films reviewed at this visit  Medical Decision Making:    At this point the patient is doing very well.  The patient is pleased with postsurgical outcome.  With the resolution of pain, I believe that it is adequate to see the patient back on an as-needed basis.  The patient has been educated on reasons that would necessitate a referral back to us in a more urgent manner.  The patient understands of his instructions and limitations for the best post-operative success.    It has been a pleasure providing neurosurgical care.    Arthur Cornejo PA-C  There are no diagnoses linked to this encounter.  No Follow-up on file.

## 2018-11-21 ENCOUNTER — HOSPITAL ENCOUNTER (OUTPATIENT)
Age: 43
End: 2018-11-21
Payer: MEDICARE

## 2018-11-21 DIAGNOSIS — R91.1: Primary | ICD-10-CM

## 2018-11-21 PROCEDURE — 71250 CT THORAX DX C-: CPT

## 2018-11-30 ENCOUNTER — TELEPHONE (OUTPATIENT)
Dept: PAIN MEDICINE | Facility: CLINIC | Age: 43
End: 2018-11-30

## 2018-11-30 NOTE — TELEPHONE ENCOUNTER
Received fax request from [employer] [facility] stating patient needs prior authorization for Tizanidine 2 mg tablet. Take 1 tablet by mouth four times daily as needed for muscle spasms. Qty: 120. PA submitted by phone to [facility] [employer] at [phone] on 11/30/2018. They will contact with status of authorization

## 2019-02-14 RX ORDER — GABAPENTIN 600 MG/1
TABLET, FILM COATED ORAL
Qty: 30 TABLET | Refills: 2 | OUTPATIENT
Start: 2019-02-14 | End: 2019-05-22 | Stop reason: SDUPTHER

## 2019-02-14 NOTE — TELEPHONE ENCOUNTER
Received refill request for Gralise 600 mg qhs. Patient last seen 11/01/18. [facility] report # [id number] scanned to media. Rx called in

## 2019-02-18 RX ORDER — GABAPENTIN 600 MG/1
TABLET, FILM COATED ORAL
Qty: 30 TABLET | Refills: 5 | OUTPATIENT
Start: 2019-02-18

## 2019-02-21 RX ORDER — GABAPENTIN 600 MG/1
TABLET, FILM COATED ORAL
Qty: 30 TABLET | Refills: 5 | OUTPATIENT
Start: 2019-02-21

## 2019-04-25 ENCOUNTER — TELEPHONE (OUTPATIENT)
Dept: PAIN MEDICINE | Facility: CLINIC | Age: 44
End: 2019-04-25

## 2019-04-25 NOTE — TELEPHONE ENCOUNTER
Patient's worker's comp, [employer] is required patient to take random drug screening. We received kit in the mail today for oral swab. Called patient to have come in to the office. Reached voicemail. Left message for return call

## 2019-05-17 RX ORDER — GABAPENTIN 600 MG/1
TABLET, FILM COATED ORAL
Qty: 30 TABLET | Refills: 2 | OUTPATIENT
Start: 2019-05-17

## 2019-05-20 RX ORDER — GABAPENTIN 600 MG/1
TABLET, FILM COATED ORAL
Qty: 30 TABLET | Refills: 2 | OUTPATIENT
Start: 2019-05-20

## 2019-05-21 RX ORDER — GABAPENTIN 600 MG/1
TABLET, FILM COATED ORAL
Qty: 30 TABLET | Refills: 2 | OUTPATIENT
Start: 2019-05-21

## 2019-05-22 ENCOUNTER — OFFICE VISIT (OUTPATIENT)
Dept: PAIN MEDICINE | Facility: CLINIC | Age: 44
End: 2019-05-22

## 2019-05-22 VITALS
RESPIRATION RATE: 18 BRPM | BODY MASS INDEX: 25.23 KG/M2 | WEIGHT: 157 LBS | SYSTOLIC BLOOD PRESSURE: 102 MMHG | TEMPERATURE: 97.3 F | HEART RATE: 84 BPM | DIASTOLIC BLOOD PRESSURE: 60 MMHG | OXYGEN SATURATION: 98 % | HEIGHT: 66 IN

## 2019-05-22 DIAGNOSIS — M75.01 ADHESIVE CAPSULITIS OF RIGHT SHOULDER: ICD-10-CM

## 2019-05-22 DIAGNOSIS — G43.009 MIGRAINE WITHOUT AURA AND WITHOUT STATUS MIGRAINOSUS, NOT INTRACTABLE: ICD-10-CM

## 2019-05-22 DIAGNOSIS — G56.81 SUPRASCAPULAR ENTRAPMENT NEUROPATHY OF RIGHT SIDE: ICD-10-CM

## 2019-05-22 DIAGNOSIS — G47.00 INSOMNIA, UNSPECIFIED TYPE: ICD-10-CM

## 2019-05-22 DIAGNOSIS — M79.18 MYOFASCIAL PAIN: ICD-10-CM

## 2019-05-22 DIAGNOSIS — M62.838 MUSCLE SPASM: ICD-10-CM

## 2019-05-22 DIAGNOSIS — M62.50 ATROPHY, MUSCLE DISUSE: ICD-10-CM

## 2019-05-22 DIAGNOSIS — M47.812 FACET ARTHROPATHY, CERVICAL: ICD-10-CM

## 2019-05-22 DIAGNOSIS — F32.A DEPRESSION, UNSPECIFIED DEPRESSION TYPE: ICD-10-CM

## 2019-05-22 DIAGNOSIS — G90.511 COMPLEX REGIONAL PAIN SYNDROME TYPE 1 OF RIGHT UPPER EXTREMITY: Primary | ICD-10-CM

## 2019-05-22 PROCEDURE — 99213 OFFICE O/P EST LOW 20 MIN: CPT | Performed by: PHYSICIAN ASSISTANT

## 2019-05-22 RX ORDER — GABAPENTIN 600 MG/1
TABLET, FILM COATED ORAL
Qty: 30 TABLET | Refills: 5 | OUTPATIENT
Start: 2019-05-22 | End: 2019-11-18 | Stop reason: SDUPTHER

## 2019-05-22 NOTE — PROGRESS NOTES
"Chief Complaint: \"Medication refill.\"    Brief History: Ms. Nettie Araya is a 44 y.o. female, who underwent explantation of percutaneous SCS device followed by implantation of a [employer] spinal cord stimulator device (MRI full body in 1.5 Bee) with Dr. Jam Pack on 10/16/2018.  The device was implanted primarily for treatment of CRPS of the upper extremities.  She reports that the stimulator is working well, ans she does not requests reprogramming.  Patient returns to the clinic for medication refills.  Patient takes 600 mg of Gralise every night at bedtime.  Patient denies adverse effects of medication.  Pain level is rated as 6/10 with the stimulator \"turned on.”  Patient level ranges from 4/10 to 8/10 with the use of the spinal cord stimulator device.    Pain location: Cervical   Pain radiation: None. Hyperesthesia, hyperalgesia and allodynia in her arms has resolved with the use of her SCS device  Pain increases with movement of the cervical spine particularly extension and rotation of the cervical spine, and also with movement of her upper extremities involving her shoulders.  Pain decreases with rest    Associated symptoms:  Patient reports intermittent tingling or numbness and weakness in the upper extremities  Patient denies any new bladder or bowel problems   Patient denies difficulties with her balance or any recent falls  Patient reports daily cervicogenic headaches lasting throughout the day.    Pain History: Mrs. Araya has a long history of complex regional pain syndrome type I of the right shoulder as as result of a work related injury that occurred in September 2009. She underwent implantation of her spinal cord stimulator device with me on 01/20/2014. Patient did well until X-rays in 2016 revealed that the lead on the left side of the posterior epidural space had caudally migrated. The right lead remained in the cervical region with top electrodes projecting at the interspace at C6-C7 " on the right side, also with some caudal migration from C3 (original placement site). Patient had a history of multiple falls with could have caused migration of the leads. Her IPG is also behaving as end of life and not retaining charge.     Review of previous therapies and additional medical records:  Nettie has already failed the following measures, including:   Conservative measures: oral analgesics, opioids, topical analgesics, physical therapy, ice, heat, chiropractic therapy and TENs   Interventional measures: SGBs, SCS originally implanted in 1/2014.  Bilateral cervical RFTC on 04/17/2017.  Surgical measures: SCS implant, as referenced above  Nettie presents with the following comorbidities is a healthy adult other than her chronic pain.  In terms of current analgesics, Nettie takes: Gralise 600 mg at bedtime, nortriptyline 10-20 mg q.h.s., Zipsor 25 mg q.i.d., tizanidine 2 mg p.r.n.  Patient also takes Ambien, Ativan, Cymbalta, and Wellbutrin. Patient denies any side effects from her medications.   I have reviewed [facility] Report #[id number], consistent to medication reconciliation.    Global Pain Scale 11-1-18 05-22-19         Pain 8 15         Feelings 10 13         Clinical outcomes 15 19         Activities 10 10         GPS Total: 43 57           Review of Diagnostic Studies:    CT CERVICAL SPINE WO CONTRAST-08/28/2018: Spinal cord stimulator leads terminate at the C7 level, unchanged in position since the 2016 comparison radiograph. Imaged portion of the leads appear to be contiguous without disruption. Vertebral body alignment and vertebral body heights are normal. There is minor disc space narrowing and osteophytic spurring. What can be seen intracranially is unremarkable. A 4 mm nodule is visible at the right lung apex. 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       The following portions of the patient's history were reviewed and updated as appropriate: problem list, past medical history, past surgery  "history, social history, family history, medications, and allergies    Review of Systems   All other systems reviewed and are negative.    /60   Pulse 84   Temp 97.3 °F (36.3 °C) (Temporal)   Resp 18   Ht 167.6 cm (66\")   Wt 71.2 kg (157 lb)   SpO2 98%   BMI 25.34 kg/m²       Physical Exam   Neurologic Exam  General appearance: No acute distress.  Well appearing and well nourished.   Head and face: Normal.     Conjunctiva and lids: No swelling, erythema or discharge.    Pupils and irises: Equal, round, and reactive to light.    Neck: Supple, symmetric, trachea midline, no masses.     Pulmonary: No increased work of breathing or signs of respiratory distress.  Clear to auscultation bilaterally.    Cardiovascular: Normal rate and rhythm, normal S1 and S2, no murmurs.    Musculoskeletal    Gait and station: Normal.   Cervical spine: The range of motion of the cervical spine is limited secondary to pain. Cervical facet joint loading maneuvers are positive. The range of motion of the right glenohumeral joint is limited particularly to abduction above 60 degrees. The range of motion of the left shoulder is full and without pain. Rotator cuff strength: 5/5  Muscle strength/tone: Normal.    Skin and subcutaneous tissue: Normal without rashes or lesions.    Palpation of skin and subcutaneous tissue: Normal turgor. Spinal cord stimulator implant site: Surgical wounds are well healed and with complete epithelialization of both surgical wounds. There is no redness, drainage, or fluid accumulation at the surgical sites. There is no tenderness upon mobilization of the IPG in the IPG pocket.    Neurologic    Cranial nerves: Cranial nerves II-XII intact.    Cortical function: Normal mental status.    Reflexes: 3+ and symmetric.  No ankle clonus bilaterally.   Babinski reflex absent bilaterally.  Spurling sign is negative. Lhermitte sign is negative. Straight leg raising test is negative.    Sensation: No sensory " loss. There is a remarkable reduction in the hyperalgesia, hyperesthesia, and allodynia in the right forearm and right hand during the examination and with the spinal cord stimulator on. Minimal hyperalgesia, hyperesthesia, or allodynia on the left shoulder   Coordination: Normal finger to nose and heel to shin.    Psychiatric    Judgment and insight: Normal.    Orientation to person, place, and time: Normal.    Recent and remote memory: Intact.    Mood and affect: Flat    ASSESSMENT:   1. Complex regional pain syndrome type 1 of right upper extremity    2. Cervical facet arthropathy/cervical spondylosis without myelopathy    3. Adhesive capsulitis of right shoulder    4. Muscle spasm    5. Myofascial pain    6. Atrophy, muscle disuse    7. Suprascapular entrapment neuropathy of right side    8. Migraine without aura and without status migrainosus, not intractable    9. Insomnia, unspecified type    10. Depression, unspecified depression type      PLAN: I have reviewed all available medical records as well as previous therapies as referenced above, and discussed the patient with Dr. Randle.  1. Follow up as needed for SCS reprogramming.   2. Long term rehabilitation efforts:    A. Follow up with Dr. Canelo Bowser for cognitive behavioral therapy, biofeedback, and assistance in the development of appropriate coping skills.  3. Pharmacological measures, as follows:   A. Continue Gralise 600 mg with evening meals.  B. Continue nortriptyline 10-20 mg at bedtime.  C. Continue Zanaflex 2 mg half a tablet to a tablet 4 times a day when necessary muscle spasms.  D. Continue Lidocaine 10%, clonidine 0.1%, baclofen 5%, cyclobenzaprine 4%, amitriptyline 2%, gabapentin 10%, prilocaine 2% gel, apply gel to the right shoulder as needed.  e. Continue Cymbalta  F. Continue Zipsor 25 mg 4 times a day only when necessary for breakthrough pain. Patient has been instructed again not to take any other NSAIDs.  4. The patient has been  instructed to contact my office with any questions or difficulties. The patient understands the plan and agrees to proceed accordingly.       Patient Care Team:  Randy Gonzales MD as PCP - General (Family Medicine)  Randy Gonzales MD as PCP - Claims Attributed  Kaveh Randle MD as Consulting Physician (Pain Medicine)  Cielo Hernandez MD as Consulting Physician (Neurology)  Nettie Garcia DO as Consulting Physician (Neurology)  Jam Pack MD as Consulting Physician (Neurosurgery)  Nhi Plascencia APRN as Nurse Practitioner (Family Medicine)  Domingo Spear PA-C as Physician Assistant (Physician Assistant)     No orders of the defined types were placed in this encounter.        Future Appointments   Date Time Provider Department Center   11/11/2019 11:00 AM Domingo Spear PA-C [facility] None         Domingo Spear PA-C    EMR Dragon/Transcription disclaimer:  Much of this encounter note is an electronic transcription of spoken language to printed text. Electronic transcription of spoken language may permit erroneous, or at times, nonsensical words or phrases to be inadvertently transcribed. Although I have reviewed the note for such errors, some may still exist.

## 2019-06-19 RX ORDER — DICLOFENAC POTASSIUM 25 MG/1
CAPSULE, LIQUID FILLED ORAL
Qty: 120 CAPSULE | Refills: 5 | Status: SHIPPED | OUTPATIENT
Start: 2019-06-19 | End: 2019-11-13 | Stop reason: SDUPTHER

## 2019-08-08 RX ORDER — NORTRIPTYLINE HYDROCHLORIDE 10 MG/1
CAPSULE ORAL
Qty: 180 CAPSULE | Refills: 0 | Status: SHIPPED | OUTPATIENT
Start: 2019-08-08 | End: 2019-10-31 | Stop reason: SDUPTHER

## 2019-08-08 RX ORDER — DULOXETIN HYDROCHLORIDE 60 MG/1
CAPSULE, DELAYED RELEASE ORAL
Qty: 90 CAPSULE | Refills: 0 | Status: SHIPPED | OUTPATIENT
Start: 2019-08-08 | End: 2019-10-31 | Stop reason: SDUPTHER

## 2019-09-10 ENCOUNTER — TELEPHONE (OUTPATIENT)
Dept: NEUROSURGERY | Facility: CLINIC | Age: 44
End: 2019-09-10

## 2019-09-10 NOTE — TELEPHONE ENCOUNTER
These medications were prescribed by Dr. Randle. The peer to peer should take place with their office.

## 2019-09-10 NOTE — TELEPHONE ENCOUNTER
Provider:  Ramone  Caller: Rivka with Medical Management  Time of call:   3:35  Phone #:  [phone]  Surgery:  Partial C2-C4 laminectomy spinal cord stimulator removal and replacement  Surgery Date:  10/16/18  Last visit:   11/01/18  Next visit: None    KARY:         Reason for call:     Patient is a workman's comp patient.      Rivka from Medical management called and they are requesting a peer to peer on patient's meds.    Some of these medications include Gralise, Pemelor, Zipsor and cymbalta.    They are requesting this peer to peer to take place on 09-13-19 by 6:00 PM eastern time.

## 2019-09-11 ENCOUNTER — TELEPHONE (OUTPATIENT)
Dept: PAIN MEDICINE | Facility: CLINIC | Age: 44
End: 2019-09-11

## 2019-09-11 NOTE — TELEPHONE ENCOUNTER
Called [phone] to complete peer to peer. Unable to complete peer to peer at this time no doctor available. They are to return call. Return number given- cell phone until 5 pm. If they call back tomorrow- they would speak to Corrina RESTREPO. Number for office given.

## 2019-09-12 ENCOUNTER — TELEPHONE (OUTPATIENT)
Dept: PAIN MEDICINE | Facility: CLINIC | Age: 44
End: 2019-09-12

## 2019-09-12 NOTE — TELEPHONE ENCOUNTER
Peer to Peer review completed through medical management, spoke with physician regarding Mrs. Araya's medications (zipsor, gralise, nortriptyline, zanaflex, and cymbalta, and topical cream), reasoning and mechanism of each medication provided through explanation. Review will be completed and submitted per physician of peer to peer.

## 2019-10-16 RX ORDER — TIZANIDINE 2 MG/1
TABLET ORAL
Qty: 120 TABLET | Refills: 5 | Status: SHIPPED | OUTPATIENT
Start: 2019-10-16 | End: 2019-11-13 | Stop reason: SDUPTHER

## 2019-10-31 RX ORDER — DULOXETIN HYDROCHLORIDE 60 MG/1
CAPSULE, DELAYED RELEASE ORAL
Qty: 90 CAPSULE | Refills: 0 | Status: SHIPPED | OUTPATIENT
Start: 2019-10-31 | End: 2019-11-13 | Stop reason: SDUPTHER

## 2019-10-31 RX ORDER — NORTRIPTYLINE HYDROCHLORIDE 10 MG/1
CAPSULE ORAL
Qty: 180 CAPSULE | Refills: 0 | Status: SHIPPED | OUTPATIENT
Start: 2019-10-31 | End: 2019-11-13 | Stop reason: SDUPTHER

## 2019-11-06 NOTE — PROGRESS NOTES
"Chief Complaint: \"I am here for medication refill and my SCS needs reprogramming\"      History of Present Illness:   Patient: Ms. Nettie Araya, 44 y.o. female was last seen on 05/22/2019 by Domingo Spear PA-C for medication refill, she did not require SCS reprogramming at the time of her last office visit. Patient returns to the clinic for evaluation of her chronic pain, possible spinal cord stimulator reprogramming, and medication refill. Patient reports prior to 2 months ago she was still getting 60-80% relief from her stimulator device, today she feels her stimulator device has only been giving her 20-25% relief of her hyperesthesia, hyperalgesia and allodynia in her arms, and bilateral cervical pain. Patient also reports bilateral frontal and occipital cervicogenic headaches lasting 24 hours per day, she reports no relief of daily headaches. She was referred to a neurologist in [place], Dr. Nettie Garcia for daily headaches by her PCP.  She reports she was started on Aimovig at that time, which she reports has not helped.  Patient does report a history of migraine headaches.  She reports no significant changes in her medical history alongwith with no new diagnostics.  Radiation of pain: The pain does not radiate. Hyperesthesia, hyperalgesia and allodynia in her arms has significantly  Improved with the use of her SCS device and reprogramming today.   Pain intensity today: 7/10 with stimulator \"turned on\"  4/10 after stimulator reprogramming.  Average pain intensity last week: 6/10  Pain intensity ranges from: 4/10 to 9/10  Aggravating factors: Pain increases with rotation and and especially extension of the cervical spine, with movement of her upper extremities involving her shoulders..   Alleviating factors: Pain decreases with analgesics and rest, and \"my spinal cord stimulator device\".   Associated symptoms:   Patient denies  pain, numbness and weakness in the bilateral upper lower extremities. "   Patient denies  any new bladder or bowel problems.   Patient reports difficulties with her balance but no recent falls.     Pain History:   Ms. Nettie Araya, 44 y.o. female has a long standing history of complex regional pain syndrome type 1 of the right shoulder as a result of a work related injury occurring in September 2009.  She underwent explantation of spinal cord stimulator device inserted by Dr. Kaveh Randle on January 20, 2014 after it was discovered by X-rays in 2016 the lead on the left side of the posterior epidural space had migrated.  The patient had a history of multiple falls which could be the cause of the lead migration. On October 16, 2018 she underwent explantation of percutaneous SCS device followed by implantation of a spinal cord stimulator device with Dr. Jam Pack with [employer]tr[employer]. The[employer]quare[employer]tronic Specify SureScan 2x8 MRI paddle (MRI full body in 1.5 Bee) with the top electrodes projecting at the level of the superior endplate of the C2 vertebral level. IPG: Vive Uniqueis with AdaptiveStim (MRI compatible full body). The device was implanted primarily for treatment of CRPS of the upper extremities.     Review of previous therapies and additional medical records:  Nettie Araya has already failed the following measures, including:   Conservative measures: oral analgesics, opioids, physical therapy, chiropractic therapy and TENs   Interventional measures: SGBs, SCS originally implanted in 1/2014 with Medtronic RestoreSensor MRI compatible Sure Scan with two leads with the top electrodes projecting at the level of the superior endplate of C3. On 04/17/2017, bilateral cervical RFTC  Surgical measures: SCS implant revision as referenced above  Nettie Araya presents with significant comorbidities including anxiety and depression,  engaged in treatment., CRPS, and migraine headaches engaged in treatment.   In terms of current analgesics, Nettie Araya takes: Gralise 600 mg at  bedtime, nortriptyline 10-20 mg qhs., Zipsor 25 mg qid., tizanidine 2 mg prn. Patient also takes Ambien, Ativan, Cymbalta, and Wellbutrin. Patient denies any side effects from her medications.   I have reviewed [facility] Report #[id number] consistent to medication reconciliation.    Global Pain Scale 11-01- 2018 05-22- 2019 11-13- 2019        Pain 8 15 17        Feelings 10 13 17        Clinical outcomes 15 19 20        Activities 10 10 11        GPS Total: 43 57 65          Diagnostic Studies:    CT CERVICAL SPINE WO CONTRAST-08/28/2018: Spinal cord stimulator leads terminate at the C7 level, unchanged in position since the 2016 comparison radiograph. Imaged portion of the leads appear to be contiguous without disruption. Vertebral body alignment and vertebral body heights are normal. There is minor disc space narrowing and osteophytic spurring. What can be seen intracranially is unremarkable. A 4 mm nodule is visible at the right lung apex. 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  XR SPINE CERVICAL 2 OR 3 VIEWS - 8/1/2018. One lead is seen with the superior electrodes projecting at the level of the superior endplate of the C7 vertebral endplate. Straightening of the usual lordosis. Facets are aligned. Disc space narrowing and osteophytic spurring, unchanged. No appreciable prevertebral soft tissue swelling. Spinal cord stimulator lead terminates at C7. Lung apices are clear.  Right Shoulder MRI 12/2010 revealed small glenohumeral joint effusion, capsulitis, tendinitis. No rotator cuff for labral tear was noted  X-ray of the right wrist on 04/30/2014, revealed linear sclerosis within the distal radial metaphysis consistent with healed fracture. There is irregularity of the ulnar styloid consistent with prior fracture. Ulnar positive variance. Mild soft tissue swelling about the wrist.     Review of Systems   All other systems reviewed and are negative.        Patient Active Problem List   Diagnosis   • Knee pain   •  Depression   • Cervical facet arthropathy/cervical spondylosis without myelopathy   • Atrophy, muscle disuse   • Myofascial pain   • Muscle spasm   • Suprascapular entrapment neuropathy of right side   • Frozen shoulder   • Insomnia   • Mild obesity   • Chronic tension-type headache, not intractable   • Physical deconditioning   • Common migraine without aura   • Adrenal nodule (CMS/HCC)   • Lung nodule, multiple   • Encounter for fitting and adjustment of neuropacemaker of spinal cord   • Complex regional pain syndrome type 1 of right upper extremity   • CPRS 1 (complex regional pain syndrome I) of upper limb       Past Medical History:   Diagnosis Date   • Abnormal MRI, shoulder     Right Shoulder MRI 12/2010 revealed small glenohumeral joint effusion, capsulitis, tendinitis. No rotator cuff for labral tear was noted.    • Abnormal x-ray     X-Ray of the right wrist on 04/30/2014, revealed linear sclerosis within the distal radial metaphysis consistent with healed fracture. There is irregularity of the ulnar styloid consistent with prior fracture.     • Anxiety    • Depression    • History of constipation    • History of insomnia     Seconday to pain   • History of migraine headaches    • History of varicose veins    • Pain in joint, shoulder region          Past Surgical History:   Procedure Laterality Date   • BACK SURGERY  2014    SCS implant   • CERVICAL LAMINECTOMY DECOMPRESSION POSTERIOR Bilateral 10/16/2018    Procedure: PARTIAL C2, C3, C4 LAMINECTOMY SPINAL CORD STIMULATOR REMOVAL INSERTION OF SPINAL CORD STIMULATOR;  Surgeon: Jam Pack MD;  Location: [facility];  Service: Neurosurgery   • COLONOSCOPY     • OTHER SURGICAL HISTORY      Left Distal Radiolnar Joint Stabilization and Capsulodesis   • ROTATOR CUFF REPAIR      Rotator cuff repair followed by three surgeries and manipulation under anesthesia with Dr. Ludwig. Fourth surgery performed by Dr. Graves.  Removal of suture and subacromial  decompression with lysis of adhesions 1/2011.   • SPINAL CORD STIMULATOR IMPLANT     • SPINAL CORD STIMULATOR IMPLANT N/A 10/16/2018    Procedure: REMOVAL AND REPLACEMENT SPINAL CORD STIMULATOR;  Surgeon: Jam Pack MD;  Location: [facility];  Service: Neurosurgery   • WRIST SURGERY           Family History   Problem Relation Age of Onset   • Diabetes Mother    • Hypertension Mother    • Stroke Mother    • Cancer Father    • Lymphoma Father    • Lung cancer Father    • Brain cancer Father    • Liver cancer Father    • Heart disease Maternal Grandmother          Social History     Socioeconomic History   • Marital status:      Spouse name: Not on file   • Number of children: Not on file   • Years of education: Not on file   • Highest education level: Not on file   Tobacco Use   • Smoking status: Never Smoker   • Smokeless tobacco: Never Used   Substance and Sexual Activity   • Alcohol use: No   • Drug use: No   • Sexual activity: Defer           Current Outpatient Medications:   •  amLODIPine (NORVASC) 2.5 MG tablet, Take 2.5 mg by mouth Daily., Disp: , Rfl:   •  buPROPion SR (WELLBUTRIN SR) 150 MG 12 hr tablet, Take 150 mg by mouth Every 12 (Twelve) Hours., Disp: , Rfl: 0  •  chlorthalidone (HYGROTEN) 50 MG tablet, Take 1 tablet by mouth daily., Disp: , Rfl:   •  Diclofenac Potassium (ZIPSOR) 25 MG capsule, TAKE 1 CAPSULE BY MOUTH 4 TIMES A DAYS AS NEEDED, Disp: 120 capsule, Rfl: 5  •  DULoxetine (CYMBALTA) 60 MG capsule, Take 1 capsule by mouth Daily., Disp: 90 capsule, Rfl: 0  •  furosemide (LASIX) 20 MG tablet, Take 1 tablet by mouth daily., Disp: , Rfl:   •  GRALISE 600 MG tablet tablet, TAKE 1 TABLET BY MOUTH EVERY BEDTIME, Disp: 30 tablet, Rfl: 5  •  LORazepam (ATIVAN) 1 MG tablet, Take 1 tablet by mouth Every 8 (Eight) Hours As Needed for Anxiety., Disp: , Rfl:   •  nortriptyline (PAMELOR) 10 MG capsule, take 1 to 2 capsules by mouth at bedtime, Disp: 180 capsule, Rfl: 0  •  omeprazole  "(priLOSEC) 20 MG capsule, Take 20 mg by mouth Daily., Disp: , Rfl:   •  potassium chloride ER (K-TAB) 20 MEQ tablet controlled-release ER tablet, Take 1 tablet by mouth Daily. (Patient taking differently: Take 40 mEq by mouth Daily.), Disp: 7 tablet, Rfl: 0  •  tiZANidine (ZANAFLEX) 2 MG tablet, TAKE 1 TABLET BY MOUTH 4 TIMES A DAY AS NEEDED FOR MUSCLE SPASMS, Disp: 120 tablet, Rfl: 5  •  zolpidem (AMBIEN) 10 MG tablet, TAKE 1 TABLET ONCE A DAY AT BEDTIME, Disp: , Rfl: 0  •  Gel Base gel, Lidocaine 10% Clonidine 0.1% Baclofen 5% Cyclobenzaprine 4% Amitriptyline 2% Gabapentin 10% Prilocaine 2% gel. Apply 3-4 times daily prn, Disp: 240 g, Rfl: prn      No Known Allergies      /64   Pulse 81   Temp 97.7 °F (36.5 °C) (Temporal)   Resp 18   Ht 167.6 cm (66\")   Wt 67.9 kg (149 lb 9.6 oz)   SpO2 93%   BMI 24.15 kg/m²       Physical Exam:  Constitutional: Patient is oriented to person, place, and time.   Patient appears well-developed and well-nourished.   Head: Normocephalic and atraumatic. Eyes: Conjunctivae and lids are normal. Pupils: Equal, round, reactive to light.   Neck: Trachea normal. Neck supple. No JVD present.   Lymphatic: No cervical adenopathy  Pulmonary Respiratory effort: No increased work of breathing or signs of respiratory distress. Auscultation of lungs: Clear to auscultation.   Cardiovascular Auscultation of heart: Normal rate and rhythm, normal S1 and S2, no murmurs.   Peripheral vascular exam: Normal.   Abdomen: The abdomen was soft and nontender. Bowel sounds were normal.   Musculoskeletal   Gait and station: Gait evaluation demonstrated a normal gait   Cervical spine: Passive and active range of motion are limited secondary to pain. Extension, flexion, lateral flexion, rotation of the cervical spine increased and reproduced pain. Cervical facet joint loading maneuvers are positive.  Muscles: Presence of active trigger points levator scapulae .  Tenderness noted in the bilateral " occipital and temporalis regions.  Shoulders: The range of motion of the glenohumeral joints is limited secondary to pain. Rotator cuff strength is 5/5.   Lumbar spine: Passive and active range of motion are full and without pain.   Neurological:   Patient is alert and oriented to person, place, and time.   Speech: speech is normal.   Cortical function: Normal mental status.   Cranial nerves: Cranial nerves 2-12 intact.   Reflex Scores:  Right brachioradialis: 3+  Left brachioradialis: 3+  Right biceps: 3+  Left biceps: 3+  Right triceps: 3+  Left triceps: 3+  Right patellar: 3+  Left patellar: 3+  Right Achilles: 3+  Left Achilles: 3+  Motor strength: 5/5  Motor Tone: normal tone.   Involuntary movements: none.   Superficial/Primitive Reflexes: primitive reflexes were absent.   Right Downs: absent  Left Downs: absent  Right ankle clonus: absent  Left ankle clonus: absent   Babinsky: absent  No nuchal rigidity.  Spurling sign is negative. Neck tornado test is negative. Lhermitte sign is negative. Negative long tract signs. Straight leg raising test is negative. Femoral stretch sign is negative.   Sensation: No sensory loss. Sensory exam: intact to light touch, intact pain and temperature sensation, intact vibration sensation and normal proprioception. There is a remarkable reduction in the hyperalgesia, hyperesthesia, and allodynia in the right forearm and right hand during the examination and with the spinal cord stimulator on and reprogamming complete. Minimal hyperalgesia, hyperesthesia, or allodynia on the left shoulder and left arm.  Coordination: Normal finger to nose and heel to shin. Normal balance and negative Romberg's sign   Skin and subcutaneous tissue: Spinal cord stimulator site: There is no redness, drainage, or fluid accumulation at the surgical sites. There is no tenderness upon mobilization of the IPG in the IPG pocketSkin is warm and intact. No rash noted. No cyanosis.   Psychiatric: Judgment  and insight: Normal. Orientation to person, place and time: Normal. Recent and remote memory: Intact. Mood and affect: Normal.     PROCEDURE: Analysis of the spinal cord stimulator device with spinal cord stimulator with complex reprogramming   Analysis of the stimulator reveals that patient used the Medtronic spinal cord stimulator device average 49% of the time. Analysis of impedence reveals normal impedence for all contacts. The spinal cord simulator device was reprogrammed under my direct supervision and 2 programs were created by adjusting electrode polarities, amplitude, pulse width, pulse rate, in the following fashion:      Program A1 (Preferred Program)  Electrode polarities: 2+, 3-, 5-, 6+  Amplitude: 8.8 mA     Pulse width: 510 mcs  Rate: 50 Hz     Program A2  Electrode polarities: 10+,11-, 13-, 14+  Amplitude: 7.8 mA     Pulse width: 500mcs  Rate: 50 Hz    Patient experienced significant pain relief with coverage with pleasant paresthesia in all areas of chronic pain after reprogramming.   Time spent reprogrammin minutes  A copy of the telemetry report will be scanned in the patient's chart.    ASSESSMENT:   1. Complex regional pain syndrome type 1 of right upper extremity    2. Cervical facet arthropathy/cervical spondylosis without myelopathy    3. Suprascapular entrapment neuropathy of right side    4. Migraine without aura and without status migrainosus, not intractable    5. Myofascial pain    6. Therapeutic drug monitoring    7. Depression, unspecified depression type    8. Encounter for fitting and adjustment of neuropacemaker of spinal cord    9. Insomnia, unspecified type        PLAN/MEDICAL DECISION MAKING: I had a lengthy conversation with Ms. Nettie Araya regarding her chronic pain condition and potential therapeutic options including risks, benefits, alternative therapies, to name a few.  Patient reports bilateral and frontal cervicogenic headaches lasting 24 hours per day, she  reports no relief of daily headaches. She was referred to a neurologist in [place], Dr. Nettie Garcia by her PCP for the headaches.  She reports she was started on Aimovig at that time, which she reports has not helped.  We discussed following up with the neurologist in [place] after spinal cord stimulator reprogramming to see if she has any relief of her daily headaches.  Patient is doing well with current pain management therapy plan, SCS, and pharmacological regimen and is receiving substantial pain relief of her complex regional pain syndrome after reprogramming that took place in the office today.  I have reviewed all available patient's medical records as well as previous therapies as referenced above and discussed the patient with Dr. Randle.  Therefore, I have proposed the following plan:  1. Follow up PRN for SCS reprogramming.  2. Follow up in 6 months for medication refill.  A. Random Urine drug screening   B. Gabapentin urine drug screen  3. Pharmacological measures: Reviewed. Patient also takes Patient also takes Ambien, Ativan, Cymbalta and Wellbutrin. Patient denies any side effects from her medications.   Discussed, as follows:   A. Continue Gralise 600 mg with evening meals.  B. Continue nortriptyline 10-20 mg at bedtime.  C. Continue Zanaflex 2 mg half a tablet to a tablet 4 times a day when necessary muscle spasms.  D. Continue Lidocaine 10%, clonidine 0.1%, baclofen 5%, cyclobenzaprine 4%, amitriptyline 2%, gabapentin 10%, prilocaine 2% gel, apply gel to the right shoulder as needed.  E. Continue Zipsor 25 mg 4 times a day only when necessary for breakthrough pain. Patient has been instructed again not to take any other NSAIDs.  4. Long-term rehabilitation efforts:  A. The patient does not have a history of recent falls. I did complete a risk assessment for falls.   B. Start an exercise program such as water therapy, daily walks for fitness and progressive strength training  K. Referral  to Dr. Canelo Bowser for cognitive behavioral therapy, biofeedback and assistance in the development of effective coping skills.  5. The patient has been instructed to contact my office with any questions or difficulties. The patient understands the plan and agrees to proceed accordingly.       Patient Care Team:  Randy Gonzales MD as PCP - General (Family Medicine)  Isaac Martinez MD as PCP - Claims Attributed  Kaveh Randle MD as Consulting Physician (Pain Medicine)  Cielo Hernandez MD as Consulting Physician (Neurology)  Nettie Garcia DO as Consulting Physician (Neurology)  Jam Pack MD as Consulting Physician (Neurosurgery)  Nhi Plascencia APRN as Nurse Practitioner (Family Medicine)  Domingo Spear PA-C as Physician Assistant (Physician Assistant)     New Medications Ordered This Visit   Medications   • tiZANidine (ZANAFLEX) 2 MG tablet     Sig: TAKE 1 TABLET BY MOUTH 4 TIMES A DAY AS NEEDED FOR MUSCLE SPASMS     Dispense:  120 tablet     Refill:  5   • nortriptyline (PAMELOR) 10 MG capsule     Sig: take 1 to 2 capsules by mouth at bedtime     Dispense:  180 capsule     Refill:  0   • DULoxetine (CYMBALTA) 60 MG capsule     Sig: Take 1 capsule by mouth Daily.     Dispense:  90 capsule     Refill:  0   • Diclofenac Potassium (ZIPSOR) 25 MG capsule     Sig: TAKE 1 CAPSULE BY MOUTH 4 TIMES A DAYS AS NEEDED     Dispense:  120 capsule     Refill:  5         Future Appointments   Date Time Provider Department Center   5/13/2020  8:30 AM Corrina Holley APRN [facility] None         [facility]     EMR Dragon/Transcription disclaimer:  Much of this encounter note is an electronic transcription of spoken language to printed text. Electronic transcription of spoken language may permit erroneous, or at times, nonsensical words or phrases to be inadvertently transcribed. Although I have reviewed the note for such errors, some may still exist.

## 2019-11-13 ENCOUNTER — LAB (OUTPATIENT)
Dept: LAB | Facility: HOSPITAL | Age: 44
End: 2019-11-13

## 2019-11-13 ENCOUNTER — OFFICE VISIT (OUTPATIENT)
Dept: PAIN MEDICINE | Facility: CLINIC | Age: 44
End: 2019-11-13

## 2019-11-13 VITALS
OXYGEN SATURATION: 93 % | HEIGHT: 66 IN | WEIGHT: 149.6 LBS | RESPIRATION RATE: 18 BRPM | BODY MASS INDEX: 24.04 KG/M2 | SYSTOLIC BLOOD PRESSURE: 100 MMHG | TEMPERATURE: 97.7 F | DIASTOLIC BLOOD PRESSURE: 64 MMHG | HEART RATE: 81 BPM

## 2019-11-13 DIAGNOSIS — Z51.81 THERAPEUTIC DRUG MONITORING: ICD-10-CM

## 2019-11-13 DIAGNOSIS — Z46.2 ENCOUNTER FOR FITTING AND ADJUSTMENT OF NEUROPACEMAKER OF SPINAL CORD: ICD-10-CM

## 2019-11-13 DIAGNOSIS — F32.A DEPRESSION, UNSPECIFIED DEPRESSION TYPE: ICD-10-CM

## 2019-11-13 DIAGNOSIS — G47.00 INSOMNIA, UNSPECIFIED TYPE: ICD-10-CM

## 2019-11-13 DIAGNOSIS — G90.511 COMPLEX REGIONAL PAIN SYNDROME TYPE 1 OF RIGHT UPPER EXTREMITY: ICD-10-CM

## 2019-11-13 DIAGNOSIS — M47.812 FACET ARTHROPATHY, CERVICAL: ICD-10-CM

## 2019-11-13 DIAGNOSIS — M79.18 MYOFASCIAL PAIN: ICD-10-CM

## 2019-11-13 DIAGNOSIS — G43.009 MIGRAINE WITHOUT AURA AND WITHOUT STATUS MIGRAINOSUS, NOT INTRACTABLE: ICD-10-CM

## 2019-11-13 DIAGNOSIS — G56.81 SUPRASCAPULAR ENTRAPMENT NEUROPATHY OF RIGHT SIDE: ICD-10-CM

## 2019-11-13 LAB
AMPHET+METHAMPHET UR QL: NEGATIVE
AMPHETAMINES UR QL: NEGATIVE
BARBITURATES UR QL SCN: NEGATIVE
BENZODIAZ UR QL SCN: POSITIVE
BUPRENORPHINE SERPL-MCNC: NEGATIVE NG/ML
CANNABINOIDS SERPL QL: NEGATIVE
COCAINE UR QL: NEGATIVE
METHADONE UR QL SCN: NEGATIVE
OPIATES UR QL: NEGATIVE
OXYCODONE UR QL SCN: NEGATIVE
PCP UR QL SCN: NEGATIVE
PROPOXYPH UR QL: NEGATIVE
TRICYCLICS UR QL SCN: POSITIVE

## 2019-11-13 PROCEDURE — 99214 OFFICE O/P EST MOD 30 MIN: CPT | Performed by: NURSE PRACTITIONER

## 2019-11-13 PROCEDURE — 80307 DRUG TEST PRSMV CHEM ANLYZR: CPT | Performed by: NURSE PRACTITIONER

## 2019-11-13 PROCEDURE — 95972 ALYS CPLX SP/PN NPGT W/PRGRM: CPT | Performed by: NURSE PRACTITIONER

## 2019-11-13 PROCEDURE — G0480 DRUG TEST DEF 1-7 CLASSES: HCPCS

## 2019-11-13 RX ORDER — DULOXETIN HYDROCHLORIDE 60 MG/1
60 CAPSULE, DELAYED RELEASE ORAL DAILY
Qty: 90 CAPSULE | Refills: 0 | Status: SHIPPED | OUTPATIENT
Start: 2019-11-13 | End: 2020-04-21

## 2019-11-13 RX ORDER — NORTRIPTYLINE HYDROCHLORIDE 10 MG/1
CAPSULE ORAL
Qty: 180 CAPSULE | Refills: 0 | Status: SHIPPED | OUTPATIENT
Start: 2019-11-13 | End: 2020-02-10

## 2019-11-13 RX ORDER — DICLOFENAC POTASSIUM 25 MG/1
CAPSULE, LIQUID FILLED ORAL
Qty: 120 CAPSULE | Refills: 5 | Status: SHIPPED | OUTPATIENT
Start: 2019-11-13 | End: 2020-02-14

## 2019-11-13 RX ORDER — TIZANIDINE 2 MG/1
TABLET ORAL
Qty: 120 TABLET | Refills: 5 | Status: SHIPPED | OUTPATIENT
Start: 2019-11-13 | End: 2020-06-15 | Stop reason: SDUPTHER

## 2019-11-13 NOTE — TELEPHONE ENCOUNTER
Lidocaine 10%, clonidine 0.1%, baclofen 5%, cyclobenzaprine 4%, amitriptyline 2%, gabapentin 10%, prilocaine 2% gel, apply gel to the right shoulder as needed.

## 2019-11-17 LAB — GABAPENTIN UR-MCNC: 580.6 UG/ML

## 2020-01-29 ENCOUNTER — TELEPHONE (OUTPATIENT)
Dept: PAIN MEDICINE | Facility: CLINIC | Age: 45
End: 2020-01-29

## 2020-01-29 NOTE — TELEPHONE ENCOUNTER
Spoke with Kira at [facility] regarding a worker comp peer to peer review on medications for this patient at [phone]  They are going to call on Monday at 1215 to speak with Corrina or myself.   Dr. Yamil Ornelas is the physician who will complete peer to peer.

## 2020-02-10 DIAGNOSIS — G90.511 COMPLEX REGIONAL PAIN SYNDROME TYPE 1 OF RIGHT UPPER EXTREMITY: ICD-10-CM

## 2020-02-10 RX ORDER — NORTRIPTYLINE HYDROCHLORIDE 10 MG/1
CAPSULE ORAL
Qty: 180 CAPSULE | Refills: 0 | Status: SHIPPED | OUTPATIENT
Start: 2020-02-10 | End: 2020-06-15 | Stop reason: SDUPTHER

## 2020-02-14 DIAGNOSIS — G90.511 COMPLEX REGIONAL PAIN SYNDROME TYPE 1 OF RIGHT UPPER EXTREMITY: ICD-10-CM

## 2020-02-14 RX ORDER — DICLOFENAC POTASSIUM 25 MG/1
CAPSULE, LIQUID FILLED ORAL
Qty: 120 CAPSULE | Refills: 5 | Status: SHIPPED | OUTPATIENT
Start: 2020-02-14 | End: 2020-04-21 | Stop reason: CLARIF

## 2020-04-20 DIAGNOSIS — G90.511 COMPLEX REGIONAL PAIN SYNDROME TYPE 1 OF RIGHT UPPER EXTREMITY: ICD-10-CM

## 2020-04-21 RX ORDER — DULOXETIN HYDROCHLORIDE 60 MG/1
CAPSULE, DELAYED RELEASE ORAL
Qty: 90 CAPSULE | Refills: 0 | Status: SHIPPED | OUTPATIENT
Start: 2020-04-21 | End: 2020-07-23

## 2020-04-22 ENCOUNTER — TELEPHONE (OUTPATIENT)
Dept: PAIN MEDICINE | Facility: CLINIC | Age: 45
End: 2020-04-22

## 2020-04-22 NOTE — TELEPHONE ENCOUNTER
Spoke with Hussain at [facility] [employer] [phone] regarding Cymbalta and PA that we received. Pharmacy received approval and has filled.     Spoke with Toro at [facility] regarding drug change request that we received on patient.   They report that it was sold yesterday, and do not need a change at this time.

## 2020-05-11 ENCOUNTER — TELEPHONE (OUTPATIENT)
Dept: PAIN MEDICINE | Facility: CLINIC | Age: 45
End: 2020-05-11

## 2020-05-11 NOTE — TELEPHONE ENCOUNTER
Returned call to Mirna at [facility] at [phone] who had left a message asking if we had supplies to perform a drug screen. Referral #[id number]  Spoke with Gris, they are going to overnight a kit for an oral swab.

## 2020-05-12 ENCOUNTER — TELEPHONE (OUTPATIENT)
Dept: PAIN MEDICINE | Facility: CLINIC | Age: 45
End: 2020-05-12

## 2020-05-12 NOTE — TELEPHONE ENCOUNTER
Spoke with Reina- we may hold kit for next office visit for patient. Request that we call and let them know the next follow up that is in office and keep the kit to complete in office drug screen.

## 2020-05-12 NOTE — TELEPHONE ENCOUNTER
Appointment changed for patient to have a telephone call tomorrow and an in person visit next month.

## 2020-05-21 RX ORDER — GABAPENTIN 600 MG/1
TABLET, FILM COATED ORAL
Qty: 30 TABLET | Refills: 0 | OUTPATIENT
Start: 2020-05-21 | End: 2020-06-15 | Stop reason: SDUPTHER

## 2020-06-14 NOTE — PROGRESS NOTES
"Chief Complaint: \"I am doing pretty well.\"      History of Present Illness:   Patient: Ms. Nettie Araya, 45 y.o. female was last seen on 11/13/2019 by me for medication refill. Patient returns to the clinic for evaluation of her chronic pain, possible spinal cord stimulator reprogramming, and medication refill. At the time of her last office visit with me she reported she felt her stimulator device had only been giving her 20-25% relief of her hyperesthesia, hyperalgesia and allodynia in her arms, and bilateral cervical pain. Patient also reported bilateral frontal and occipital cervicogenic headaches lasting 24 hours per day, she reported no relief of her daily headaches. She was referred to a neurologist in [place], Dr. Nettie Garcia for daily headaches by her PCP.  She reports she was started on Aimovig at that time, which she reports has began helping.  Patient does report a history of migraine headaches.  She reports no significant changes in her medical history along with with no new diagnostics.  Radiation of pain: The pain does not radiate. Hyperesthesia, hyperalgesia and allodynia in her arms has significantly improved with the use of her SCS device and reprogramming today.   Pain intensity today: 4/10 with stimulator \"turned on\"    Average pain intensity last week: 6/10  Pain intensity ranges from: 3/10 to 9/10  Aggravating factors: Pain increases with rotation and and especially extension of the cervical spine, with movement of her upper extremities involving her shoulders..   Alleviating factors: Pain decreases with analgesics and rest, and \"my spinal cord stimulator device\".   Associated symptoms:   Patient denies  pain, numbness and weakness in the bilateral upper lower extremities.   Patient denies  any new bladder or bowel problems.   Patient reports difficulties with her balance with a recent fall.     Pain History:   Ms. Nettie Araya, 44 y.o. female has a long standing history of complex " regional pain syndrome type 1 of the right shoulder as a result of a work related injury occurring in September 2009.  She underwent explantation of spinal cord stimulator device inserted by Dr. Kaveh Randle on January 20, 2014 after it was discovered by X-rays in 2016 the lead on the left side of the posterior epidural space had migrated.  The patient had a history of multiple falls which could be the cause of the lead migration. On October 16, 2018 she underwent explantation of percutaneous SCS device followed by implantation of a spinal cord stimulator device with Dr. Jam Pack with [employer]. Base CRMtronic Specify SureScan 2x8 MRI paddle (MRI full body in 1.5 Bee) with the top electrodes projecting at the level of the superior endplate of the C2 vertebral level. IPG: Smalldeals with AdaptiveStim (MRI compatible full body). The device was implanted primarily for treatment of CRPS of the upper extremities.     Review of previous therapies and additional medical records:  Nettie Araya has already failed the following measures, including:   Conservative measures: oral analgesics, opioids, physical therapy, chiropractic therapy and TENs   Interventional measures: SGBs, SCS originally implanted in 1/2014 with Medtronic RestoreSensor MRI compatible Sure Scan with two leads with the top electrodes projecting at the level of the superior endplate of C3. On 04/17/2017, bilateral cervical RFTC  Surgical measures: SCS implant revision as referenced above  Nettie Araya presents with significant comorbidities including anxiety and depression,  engaged in treatment., CRPS, and migraine headaches engaged in treatment.   In terms of current analgesics, Nettie Araya takes: Gralise 600 mg at bedtime, nortriptyline 10-20 mg qhs., Diclofenac, tizanidine 2 mg prn. Patient also takes Ambien, Ativan, Cymbalta, and Wellbutrin. Patient denies any side effects from her medications.   I have reviewed the [facility]  Report #[id number] consistent to medication reconciliation.    Global Pain Scale 11-01-  2018 05-22-  2019 11-13-  2019 06-15  2020       Pain 8 15 17 11       Feelings 10 13 17 10       Clinical outcomes 15 19 20 13       Activities 10 10 11 11       GPS Total: 43 57 65 45         Diagnostic Studies:    CT CERVICAL SPINE WO CONTRAST-08/28/2018: Spinal cord stimulator leads terminate at the C7 level, unchanged in position since the 2016 comparison radiograph. Imaged portion of the leads appear to be contiguous without disruption. Vertebral body alignment and vertebral body heights are normal. There is minor disc space narrowing and osteophytic spurring. What can be seen intracranially is unremarkable. A 4 mm nodule is visible at the right lung apex. 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  XR SPINE CERVICAL 2 OR 3 VIEWS - 8/1/2018. One lead is seen with the superior electrodes projecting at the level of the superior endplate of the C7 vertebral endplate. Straightening of the usual lordosis. Facets are aligned. Disc space narrowing and osteophytic spurring, unchanged. No appreciable prevertebral soft tissue swelling. Spinal cord stimulator lead terminates at C7. Lung apices are clear.  Right Shoulder MRI 12/2010 revealed small glenohumeral joint effusion, capsulitis, tendinitis. No rotator cuff for labral tear was noted  X-ray of the right wrist on 04/30/2014, revealed linear sclerosis within the distal radial metaphysis consistent with healed fracture. There is irregularity of the ulnar styloid consistent with prior fracture. Ulnar positive variance. Mild soft tissue swelling about the wrist.     Review of Systems   All other systems reviewed and are negative.        Patient Active Problem List   Diagnosis   • Knee pain   • Depression   • Cervical facet arthropathy/cervical spondylosis without myelopathy   • Atrophy, muscle disuse   • Myofascial pain   • Muscle spasm   • Suprascapular entrapment neuropathy of right side   •  Frozen shoulder   • Insomnia   • Mild obesity   • Chronic tension-type headache, not intractable   • Physical deconditioning   • Common migraine without aura   • Adrenal nodule (CMS/HCC)   • Lung nodule, multiple   • Encounter for fitting and adjustment of neuropacemaker of spinal cord   • Complex regional pain syndrome type 1 of right upper extremity   • CPRS 1 (complex regional pain syndrome I) of upper limb       Past Medical History:   Diagnosis Date   • Abnormal MRI, shoulder     Right Shoulder MRI 12/2010 revealed small glenohumeral joint effusion, capsulitis, tendinitis. No rotator cuff for labral tear was noted.    • Abnormal x-ray     X-Ray of the right wrist on 04/30/2014, revealed linear sclerosis within the distal radial metaphysis consistent with healed fracture. There is irregularity of the ulnar styloid consistent with prior fracture.     • Anxiety    • Depression    • History of constipation    • History of insomnia     Seconday to pain   • History of migraine headaches    • History of varicose veins    • Pain in joint, shoulder region          Past Surgical History:   Procedure Laterality Date   • BACK SURGERY  2014    SCS implant   • CERVICAL LAMINECTOMY DECOMPRESSION POSTERIOR Bilateral 10/16/2018    Procedure: PARTIAL C2, C3, C4 LAMINECTOMY SPINAL CORD STIMULATOR REMOVAL INSERTION OF SPINAL CORD STIMULATOR;  Surgeon: Jam Pack MD;  Location: [facility] beds at the [facility];  Service: Neurosurgery   • COLONOSCOPY     • OTHER SURGICAL HISTORY      Left Distal Radiolnar Joint Stabilization and Capsulodesis   • ROTATOR CUFF REPAIR      Rotator cuff repair followed by three surgeries and manipulation under anesthesia with Dr. Ludwig. Fourth surgery performed by Dr. Graves.  Removal of suture and subacromial decompression with lysis of adhesions 1/2011.   • SPINAL CORD STIMULATOR IMPLANT     • SPINAL CORD STIMULATOR IMPLANT N/A 10/16/2018    Procedure: REMOVAL AND REPLACEMENT SPINAL CORD STIMULATOR;  Surgeon:  Jam Pack MD;  [facility]  Service: Neurosurgery   • WRIST SURGERY           Family History   Problem Relation Age of Onset   • Diabetes Mother    • Hypertension Mother    • Stroke Mother    • Cancer Father    • Lymphoma Father    • Lung cancer Father    • Brain cancer Father    • Liver cancer Father    • Heart disease Maternal Grandmother          Social History     Socioeconomic History   • Marital status:      Spouse name: Not on file   • Number of children: Not on file   • Years of education: Not on file   • Highest education level: Not on file   Tobacco Use   • Smoking status: Never Smoker   • Smokeless tobacco: Never Used   Substance and Sexual Activity   • Alcohol use: No   • Drug use: No   • Sexual activity: Defer           Current Outpatient Medications:   •  amLODIPine (NORVASC) 2.5 MG tablet, Take 2.5 mg by mouth Daily., Disp: , Rfl:   •  buPROPion SR (WELLBUTRIN SR) 150 MG 12 hr tablet, Take 150 mg by mouth Every 12 (Twelve) Hours., Disp: , Rfl: 0  •  chlorthalidone (HYGROTEN) 50 MG tablet, Take 1 tablet by mouth daily., Disp: , Rfl:   •  diclofenac (VOLTAREN) 50 MG EC tablet, TAKE 1 TABLET BY MOUTH TWICE DAILY FOR MODERATE PAIN, Disp: 60 tablet, Rfl: 0  •  DULoxetine (CYMBALTA) 60 MG capsule, TAKE 1 CAPSULE BY MOUTH ONCE DAILY, Disp: 90 capsule, Rfl: 0  •  furosemide (LASIX) 20 MG tablet, Take 1 tablet by mouth daily., Disp: , Rfl:   •  Gel Base gel, Lidocaine 10% Clonidine 0.1% Baclofen 5% Cyclobenzaprine 4% Amitriptyline 2% Gabapentin 10% Prilocaine 2% gel. Apply 3-4 times daily prn, Disp: 240 g, Rfl: prn  •  GRALISE 600 MG tablet tablet, TAKE 1 TABLET BY MOUTH EVERY BEDTIME, Disp: 30 tablet, Rfl: 0  •  LORazepam (ATIVAN) 1 MG tablet, Take 1 tablet by mouth Every 8 (Eight) Hours As Needed for Anxiety., Disp: , Rfl:   •  nortriptyline (PAMELOR) 10 MG capsule, TAKE 1 TO 2 CAPSULES BY MOUTH AT BEDTIME, Disp: 180 capsule, Rfl: 0  •  omeprazole (priLOSEC) 20 MG capsule, Take 20  mg by mouth Daily., Disp: , Rfl:   •  ondansetron (ZOFRAN) 4 MG tablet, Take 4 mg by mouth Every 8 (Eight) Hours As Needed for Nausea or Vomiting., Disp: , Rfl:   •  potassium chloride ER (K-TAB) 20 MEQ tablet controlled-release ER tablet, Take 1 tablet by mouth Daily. (Patient taking differently: Take 40 mEq by mouth Daily.), Disp: 7 tablet, Rfl: 0  •  tiZANidine (ZANAFLEX) 2 MG tablet, TAKE 1 TABLET BY MOUTH 4 TIMES A DAY AS NEEDED FOR MUSCLE SPASMS, Disp: 120 tablet, Rfl: 5  •  ZIPSOR 25 MG capsule, Take 25 mg by mouth 4 (Four) Times a Day., Disp: , Rfl:   •  zolpidem (AMBIEN) 10 MG tablet, TAKE 1 TABLET ONCE A DAY AT BEDTIME, Disp: , Rfl: 0      No Known Allergies      BP (!) 84/56 (BP Location: Left arm, Patient Position: Sitting)   Pulse 82   Temp 97.9 °F (36.6 °C) (Temporal)   Resp 16   Wt 64.5 kg (142 lb 3.2 oz)   SpO2 98%   BMI 22.95 kg/m²       Physical Exam:  Constitutional: Patient is oriented to person, place, and time.   Patient appears well-developed and well-nourished.   Head: Normocephalic and atraumatic. Eyes: Conjunctivae and lids are normal. Pupils: Equal, round, reactive to light.   Neck: Trachea normal. Neck supple. No JVD present.   Lymphatic: No cervical adenopathy  Pulmonary Respiratory effort: No increased work of breathing or signs of respiratory distress. Auscultation of lungs: Clear to auscultation.   Cardiovascular Auscultation of heart: Normal rate and rhythm, normal S1 and S2, no murmurs.   Peripheral vascular exam: Normal.   Abdomen: The abdomen was soft and nontender. Bowel sounds were normal.   Musculoskeletal   Gait and station: Gait evaluation demonstrated a normal gait   Cervical spine: Passive and active range of motion are limited secondary to pain. Extension, flexion, lateral flexion, rotation of the cervical spine increased and reproduced pain. Cervical facet joint loading maneuvers are positive.  Muscles: Presence of active trigger points levator scapulae .   Tenderness noted in the bilateral occipital and temporalis regions.  Shoulders: The range of motion of the glenohumeral joints is limited secondary to pain. Rotator cuff strength is 5/5.   Lumbar spine: Passive and active range of motion are full and without pain.   Neurological:   Patient is alert and oriented to person, place, and time.   Speech: speech is normal.   Cortical function: Normal mental status.   Cranial nerves: Cranial nerves 2-12 intact.   Reflex Scores:  Right brachioradialis: 3+  Left brachioradialis: 3+  Right biceps: 3+  Left biceps: 3+  Right triceps: 3+  Left triceps: 3+  Right patellar: 3+  Left patellar: 3+  Right Achilles: 3+  Left Achilles: 3+  Motor strength: 5/5  Motor Tone: normal tone.   Involuntary movements: none.   Superficial/Primitive Reflexes: primitive reflexes were absent.   Right Downs: absent  Left Downs: absent  Right ankle clonus: absent  Left ankle clonus: absent   Babinsky: absent  No nuchal rigidity.  Spurling sign is negative. Neck tornado test is negative. Lhermitte sign is negative. Negative long tract signs. Straight leg raising test is negative. Femoral stretch sign is negative.   Sensation: No sensory loss. Sensory exam: intact to light touch, intact pain and temperature sensation, intact vibration sensation and normal proprioception. There is a remarkable reduction in the hyperalgesia, hyperesthesia, and allodynia in the right forearm and right hand during the examination and with the spinal cord stimulator on and reprogamming complete. Minimal hyperalgesia, hyperesthesia, or allodynia on the left shoulder and left arm.  Coordination: Normal finger to nose and heel to shin. Normal balance and negative Romberg's sign   Skin and subcutaneous tissue: Spinal cord stimulator site: There is no redness, drainage, or fluid accumulation at the surgical sites. There is no tenderness upon mobilization of the IPG in the IPG pocketSkin is warm and intact. No rash noted. No  cyanosis.   Psychiatric: Judgment and insight: Normal. Orientation to person, place and time: Normal. Recent and remote memory: Intact. Mood and affect: Normal.     PROCEDURE: Previous analysis of the spinal cord stimulator device   Analysis of the stimulator reveals that patient used the Medtronic spinal cord stimulator device average 49% of the time. Analysis of impedence reveals normal impedence for all contacts. The spinal cord simulator device was reprogrammed under my direct supervision and 2 programs were created by adjusting electrode polarities, amplitude, pulse width, pulse rate, in the following fashion:      Program A1 (Preferred Program)  Electrode polarities: 2+, 3-, 5-, 6+  Amplitude: 8.8 mA     Pulse width: 510 mcs  Rate: 50 Hz     Program A2  Electrode polarities: 10+,11-, 13-, 14+  Amplitude: 7.8 mA     Pulse width: 500mcs  Rate: 50 Hz        ASSESSMENT:   1. Complex regional pain syndrome type 1 of right upper extremity    2. Cervical facet arthropathy/cervical spondylosis without myelopathy    3. Myofascial pain    4. Encounter for therapeutic drug level monitoring    5. Migraine without aura and without status migrainosus, not intractable    6. Physical deconditioning    7. Depression, unspecified depression type        PLAN/MEDICAL DECISION MAKING: I had a lengthy conversation with Ms. Nettie Araya regarding her chronic pain condition and potential therapeutic options including risks, benefits, alternative therapies, to name a few. She sees neurologist Dr. Nettie Garcia for her headaches.  She reports she was started on Aimovig at that time, which she reports has has began helping. Patient is doing well with current pain management therapy plan, SCS, and pharmacological regimen and is receiving substantial pain relief.  She does report she had a fall several weeks ago, and is concerned with her SCS device.  I will have a Med[employer] representative contact her for reprogramming, and we  discussed possible x-rays if needed.  I have reviewed all available patient's medical records as well as previous therapies as referenced above and discussed the patient with Dr. Randle.  Therefore, I have proposed the following plan:  1. Follow up PRN for SCS reprogramming.  2. Follow up in 6 months for medication refill.  A. Random Urine drug screening   B. Gabapentin urine drug screen  3. Pharmacological measures: Reviewed. Patient also takes Patient also takes Ambien, Ativan, Cymbalta and Wellbutrin. Patient denies any side effects from her medications.   Discussed, as follows:   A. Continue Gralise 600 mg with evening meals.  B. Continue nortriptyline 10-20 mg at bedtime.  C. Continue Zanaflex 2 mg half a tablet to a tablet 4 times a day when necessary muscle spasms.  D. Continue Lidocaine 10%, clonidine 0.1%, baclofen 5%, cyclobenzaprine 4%, amitriptyline 2%, gabapentin 10%, prilocaine 2% gel, apply gel to the right shoulder as needed.  E. Continue Diclofenac 50 mg two times daily only when necessary for breakthrough pain. Patient has been instructed again not to take any other NSAIDs.  4. Long-term rehabilitation efforts:  A. The patient has a history of recent falls. I did complete a risk assessment for falls.   B. Start an exercise program such as water therapy, daily walks for fitness and progressive strength training  C. Referral to Dr. Canelo Bowser for cognitive behavioral therapy, biofeedback and assistance in the development of effective coping skills.  5. The patient has been instructed to contact my office with any questions or difficulties. The patient understands the plan and agrees to proceed accordingly.       Patient Care Team:  Randy Gonzales MD as PCP - General (Family Medicine)  Kaveh Randle MD as Consulting Physician (Pain Medicine)  Cielo Hernandez MD as Consulting Physician (Neurology)  Nettie Garcia DO as Consulting Physician (Neurology)  Jam Pack  MD as Consulting Physician (Neurosurgery)  Nhi Plascencia APRN as Nurse Practitioner (Family Medicine)  Domingo Spear PA-C as Physician Assistant (Physician Assistant)     No orders of the defined types were placed in this encounter.        Future Appointments   Date Time Provider Department Center   12/15/2020 11:30 AM Corrina Holley APRN [facility] None         [facility]     EMR Dragon/Transcription disclaimer:  Much of this encounter note is an electronic transcription of spoken language to printed text. Electronic transcription of spoken language may permit erroneous, or at times, nonsensical words or phrases to be inadvertently transcribed. Although I have reviewed the note for such errors, some may still exist.

## 2020-06-15 ENCOUNTER — DOCUMENTATION (OUTPATIENT)
Dept: PAIN MEDICINE | Facility: CLINIC | Age: 45
End: 2020-06-15

## 2020-06-15 ENCOUNTER — OFFICE VISIT (OUTPATIENT)
Dept: PAIN MEDICINE | Facility: CLINIC | Age: 45
End: 2020-06-15

## 2020-06-15 ENCOUNTER — LAB (OUTPATIENT)
Dept: LAB | Facility: HOSPITAL | Age: 45
End: 2020-06-15

## 2020-06-15 VITALS
HEART RATE: 82 BPM | SYSTOLIC BLOOD PRESSURE: 84 MMHG | WEIGHT: 142.2 LBS | TEMPERATURE: 97.9 F | RESPIRATION RATE: 16 BRPM | BODY MASS INDEX: 22.95 KG/M2 | DIASTOLIC BLOOD PRESSURE: 56 MMHG | OXYGEN SATURATION: 98 %

## 2020-06-15 DIAGNOSIS — M47.812 FACET ARTHROPATHY, CERVICAL: ICD-10-CM

## 2020-06-15 DIAGNOSIS — R53.81 PHYSICAL DECONDITIONING: ICD-10-CM

## 2020-06-15 DIAGNOSIS — F32.A DEPRESSION, UNSPECIFIED DEPRESSION TYPE: ICD-10-CM

## 2020-06-15 DIAGNOSIS — G90.511 COMPLEX REGIONAL PAIN SYNDROME TYPE 1 OF RIGHT UPPER EXTREMITY: ICD-10-CM

## 2020-06-15 DIAGNOSIS — G43.009 MIGRAINE WITHOUT AURA AND WITHOUT STATUS MIGRAINOSUS, NOT INTRACTABLE: ICD-10-CM

## 2020-06-15 DIAGNOSIS — Z51.81 ENCOUNTER FOR THERAPEUTIC DRUG LEVEL MONITORING: ICD-10-CM

## 2020-06-15 DIAGNOSIS — M79.18 MYOFASCIAL PAIN: ICD-10-CM

## 2020-06-15 PROCEDURE — 99213 OFFICE O/P EST LOW 20 MIN: CPT | Performed by: NURSE PRACTITIONER

## 2020-06-15 PROCEDURE — 80306 DRUG TEST PRSMV INSTRMNT: CPT | Performed by: NURSE PRACTITIONER

## 2020-06-15 PROCEDURE — G0480 DRUG TEST DEF 1-7 CLASSES: HCPCS

## 2020-06-15 RX ORDER — TIZANIDINE 2 MG/1
TABLET ORAL
Qty: 120 TABLET | Refills: 5 | Status: SHIPPED | OUTPATIENT
Start: 2020-06-15 | End: 2020-07-30

## 2020-06-15 RX ORDER — ONDANSETRON 4 MG/1
4 TABLET, FILM COATED ORAL EVERY 8 HOURS PRN
COMMUNITY

## 2020-06-15 RX ORDER — DICLOFENAC POTASSIUM 25 MG/1
25 CAPSULE, LIQUID FILLED ORAL 4 TIMES DAILY
COMMUNITY
Start: 2020-06-04 | End: 2020-06-15

## 2020-06-15 RX ORDER — NORTRIPTYLINE HYDROCHLORIDE 10 MG/1
10-20 CAPSULE ORAL NIGHTLY
Qty: 180 CAPSULE | Refills: 0 | Status: SHIPPED | OUTPATIENT
Start: 2020-06-15 | End: 2020-10-12

## 2020-06-15 NOTE — PROGRESS NOTES
Oral drug screen complete per  request. Spoke with Reina at [facility]. [employer] delivery scheduled for today prior to end of day. Package placed at .

## 2020-06-17 LAB — GABAPENTIN UR-MCNC: >800 UG/ML

## 2020-07-15 DIAGNOSIS — G90.511 COMPLEX REGIONAL PAIN SYNDROME TYPE 1 OF RIGHT UPPER EXTREMITY: ICD-10-CM

## 2020-07-19 RX ORDER — GABAPENTIN 600 MG/1
TABLET, FILM COATED ORAL
Qty: 30 TABLET | Refills: 5 | Status: SHIPPED | OUTPATIENT
Start: 2020-07-19 | End: 2021-01-18

## 2020-07-23 DIAGNOSIS — G90.511 COMPLEX REGIONAL PAIN SYNDROME TYPE 1 OF RIGHT UPPER EXTREMITY: ICD-10-CM

## 2020-07-23 RX ORDER — DULOXETIN HYDROCHLORIDE 60 MG/1
CAPSULE, DELAYED RELEASE ORAL
Qty: 90 CAPSULE | Refills: 0 | Status: SHIPPED | OUTPATIENT
Start: 2020-07-23 | End: 2020-09-28

## 2020-07-30 RX ORDER — BACLOFEN 10 MG/1
10 TABLET ORAL 4 TIMES DAILY
Qty: 120 TABLET | Refills: 5 | Status: SHIPPED | OUTPATIENT
Start: 2020-07-30 | End: 2021-01-26

## 2020-07-30 NOTE — TELEPHONE ENCOUNTER
LVM on pharmacy voicemail to cancel refills for Tizanidine per WC agreement because Baclofen was called in.

## 2020-08-05 ENCOUNTER — HOSPITAL ENCOUNTER (OUTPATIENT)
Dept: HOSPITAL 22 - INF | Age: 45
Discharge: HOME | End: 2020-08-05
Payer: MEDICARE

## 2020-08-05 VITALS
OXYGEN SATURATION: 98 % | SYSTOLIC BLOOD PRESSURE: 100 MMHG | DIASTOLIC BLOOD PRESSURE: 57 MMHG | HEART RATE: 76 BPM | RESPIRATION RATE: 18 BRPM

## 2020-08-05 VITALS
OXYGEN SATURATION: 98 % | DIASTOLIC BLOOD PRESSURE: 51 MMHG | HEART RATE: 76 BPM | SYSTOLIC BLOOD PRESSURE: 112 MMHG | RESPIRATION RATE: 18 BRPM | TEMPERATURE: 97.7 F

## 2020-08-05 DIAGNOSIS — D64.9: Primary | ICD-10-CM

## 2020-08-05 DIAGNOSIS — D50.9: ICD-10-CM

## 2020-08-05 PROCEDURE — 96365 THER/PROPH/DIAG IV INF INIT: CPT

## 2020-08-12 ENCOUNTER — HOSPITAL ENCOUNTER (OUTPATIENT)
Dept: HOSPITAL 22 - INF | Age: 45
Discharge: HOME | End: 2020-08-12
Payer: MEDICARE

## 2020-08-12 VITALS
DIASTOLIC BLOOD PRESSURE: 52 MMHG | HEART RATE: 69 BPM | OXYGEN SATURATION: 99 % | SYSTOLIC BLOOD PRESSURE: 99 MMHG | RESPIRATION RATE: 20 BRPM

## 2020-08-12 VITALS
OXYGEN SATURATION: 100 % | RESPIRATION RATE: 20 BRPM | SYSTOLIC BLOOD PRESSURE: 114 MMHG | HEART RATE: 72 BPM | DIASTOLIC BLOOD PRESSURE: 46 MMHG | TEMPERATURE: 97.9 F

## 2020-08-12 DIAGNOSIS — D50.9: ICD-10-CM

## 2020-08-12 DIAGNOSIS — D64.9: Primary | ICD-10-CM

## 2020-08-12 PROCEDURE — 96365 THER/PROPH/DIAG IV INF INIT: CPT

## 2020-08-19 ENCOUNTER — HOSPITAL ENCOUNTER (EMERGENCY)
Age: 45
Discharge: HOME | End: 2020-08-19
Payer: MEDICARE

## 2020-08-19 VITALS
HEART RATE: 104 BPM | OXYGEN SATURATION: 98 % | SYSTOLIC BLOOD PRESSURE: 97 MMHG | DIASTOLIC BLOOD PRESSURE: 59 MMHG | RESPIRATION RATE: 14 BRPM | TEMPERATURE: 99 F

## 2020-08-19 VITALS — BODY MASS INDEX: 22.8 KG/M2

## 2020-08-19 VITALS
DIASTOLIC BLOOD PRESSURE: 59 MMHG | TEMPERATURE: 98.96 F | RESPIRATION RATE: 14 BRPM | SYSTOLIC BLOOD PRESSURE: 97 MMHG | OXYGEN SATURATION: 98 % | HEART RATE: 104 BPM

## 2020-08-19 DIAGNOSIS — B34.9: ICD-10-CM

## 2020-08-19 DIAGNOSIS — J20.8: Primary | ICD-10-CM

## 2020-08-19 DIAGNOSIS — E03.9: ICD-10-CM

## 2020-08-19 DIAGNOSIS — Z20.828: ICD-10-CM

## 2020-08-19 DIAGNOSIS — M79.7: ICD-10-CM

## 2020-08-19 PROCEDURE — U0003 INFECTIOUS AGENT DETECTION BY NUCLEIC ACID (DNA OR RNA); SEVERE ACUTE RESPIRATORY SYNDROME CORONAVIRUS 2 (SARS-COV-2) (CORONAVIRUS DISEASE [COVID-19]), AMPLIFIED PROBE TECHNIQUE, MAKING USE OF HIGH THROUGHPUT TECHNOLOGIES AS DESCRIBED BY CMS-2020-01-R: HCPCS

## 2020-08-19 PROCEDURE — 99202 OFFICE O/P NEW SF 15 MIN: CPT

## 2020-08-19 PROCEDURE — 71046 X-RAY EXAM CHEST 2 VIEWS: CPT

## 2020-08-21 ENCOUNTER — HOSPITAL ENCOUNTER (OUTPATIENT)
Dept: HOSPITAL 22 - RAD | Age: 45
End: 2020-08-21
Payer: MEDICARE

## 2020-08-21 DIAGNOSIS — E27.8: ICD-10-CM

## 2020-08-21 DIAGNOSIS — R91.1: Primary | ICD-10-CM

## 2020-08-21 PROCEDURE — 71260 CT THORAX DX C+: CPT

## 2020-08-21 PROCEDURE — 74170 CT ABD WO CNTRST FLWD CNTRST: CPT

## 2020-08-26 ENCOUNTER — HOSPITAL ENCOUNTER (OUTPATIENT)
Age: 45
End: 2020-08-26
Payer: MEDICARE

## 2020-08-26 DIAGNOSIS — Z03.818: Primary | ICD-10-CM

## 2020-08-26 PROCEDURE — U0004 COV-19 TEST NON-CDC HGH THRU: HCPCS

## 2020-08-27 LAB — COVID-19 NASAL PCR SENDOUT LEX: NOT DETECTED

## 2020-09-08 ENCOUNTER — HOSPITAL ENCOUNTER (OUTPATIENT)
Age: 45
End: 2020-09-08
Payer: MEDICARE

## 2020-09-08 DIAGNOSIS — E27.8: ICD-10-CM

## 2020-09-08 DIAGNOSIS — E04.1: Primary | ICD-10-CM

## 2020-09-08 PROCEDURE — 76536 US EXAM OF HEAD AND NECK: CPT

## 2020-09-08 PROCEDURE — 74178 CT ABD&PLV WO CNTR FLWD CNTR: CPT

## 2020-09-26 DIAGNOSIS — G90.511 COMPLEX REGIONAL PAIN SYNDROME TYPE 1 OF RIGHT UPPER EXTREMITY: ICD-10-CM

## 2020-09-28 ENCOUNTER — DOCUMENTATION (OUTPATIENT)
Dept: PAIN MEDICINE | Facility: CLINIC | Age: 45
End: 2020-09-28

## 2020-09-28 RX ORDER — DULOXETIN HYDROCHLORIDE 60 MG/1
CAPSULE, DELAYED RELEASE ORAL
Qty: 90 CAPSULE | Refills: 0 | Status: SHIPPED | OUTPATIENT
Start: 2020-09-28 | End: 2020-12-31

## 2020-09-28 NOTE — PROGRESS NOTES
Received a prior auth from [employer] regarding Duloxetine on patient.   Spoke with Denise at [phone]. Per Denise, this has been submitted to the  and patient will be notified after approval.   Key: [id number]

## 2020-10-12 DIAGNOSIS — G90.511 COMPLEX REGIONAL PAIN SYNDROME TYPE 1 OF RIGHT UPPER EXTREMITY: ICD-10-CM

## 2020-10-12 RX ORDER — NORTRIPTYLINE HYDROCHLORIDE 10 MG/1
CAPSULE ORAL
Qty: 180 CAPSULE | Refills: 0 | Status: SHIPPED | OUTPATIENT
Start: 2020-10-12 | End: 2021-01-12

## 2020-12-07 ENCOUNTER — OFFICE VISIT (OUTPATIENT)
Dept: OBSTETRICS AND GYNECOLOGY | Facility: CLINIC | Age: 45
End: 2020-12-07

## 2020-12-07 VITALS
TEMPERATURE: 97.3 F | SYSTOLIC BLOOD PRESSURE: 98 MMHG | BODY MASS INDEX: 22.02 KG/M2 | HEIGHT: 66 IN | WEIGHT: 137 LBS | DIASTOLIC BLOOD PRESSURE: 58 MMHG

## 2020-12-07 DIAGNOSIS — D39.9 NEOPLASM OF UNCERTAIN BEHAVIOR OF FEMALE GENITAL ORGAN, UNSPECIFIED: ICD-10-CM

## 2020-12-07 DIAGNOSIS — R19.00 PELVIC MASS IN FEMALE: ICD-10-CM

## 2020-12-07 DIAGNOSIS — R39.89 SUSPECTED UTI: ICD-10-CM

## 2020-12-07 DIAGNOSIS — Z12.31 BREAST CANCER SCREENING BY MAMMOGRAM: ICD-10-CM

## 2020-12-07 DIAGNOSIS — R10.2 PELVIC PAIN: ICD-10-CM

## 2020-12-07 DIAGNOSIS — Z01.419 WOMEN'S ANNUAL ROUTINE GYNECOLOGICAL EXAMINATION: Primary | ICD-10-CM

## 2020-12-07 LAB
BILIRUB BLD-MCNC: ABNORMAL MG/DL
CLARITY, POC: CLEAR
COLOR UR: ABNORMAL
GLUCOSE UR STRIP-MCNC: NEGATIVE MG/DL
KETONES UR QL: NEGATIVE
LEUKOCYTE EST, POC: ABNORMAL
NITRITE UR-MCNC: NEGATIVE MG/ML
PH UR: 6 [PH] (ref 5–8)
PROT UR STRIP-MCNC: ABNORMAL MG/DL
RBC # UR STRIP: NEGATIVE /UL
SP GR UR: 1.03 (ref 1–1.03)
UROBILINOGEN UR QL: NORMAL

## 2020-12-07 PROCEDURE — 99213 OFFICE O/P EST LOW 20 MIN: CPT | Performed by: OBSTETRICS & GYNECOLOGY

## 2020-12-07 PROCEDURE — 81002 URINALYSIS NONAUTO W/O SCOPE: CPT | Performed by: OBSTETRICS & GYNECOLOGY

## 2020-12-07 PROCEDURE — G0101 CA SCREEN;PELVIC/BREAST EXAM: HCPCS | Performed by: OBSTETRICS & GYNECOLOGY

## 2020-12-07 RX ORDER — DICLOFENAC POTASSIUM 25 MG/1
CAPSULE, LIQUID FILLED ORAL
COMMUNITY
Start: 2020-08-31 | End: 2021-04-26

## 2020-12-07 RX ORDER — TRAZODONE HYDROCHLORIDE 100 MG/1
100 TABLET ORAL
COMMUNITY
Start: 2020-11-11

## 2020-12-07 RX ORDER — BENZONATATE 100 MG/1
CAPSULE ORAL
COMMUNITY
Start: 2020-10-30 | End: 2021-03-22

## 2020-12-07 RX ORDER — ERENUMAB-AOOE 140 MG/ML
140 INJECTION, SOLUTION SUBCUTANEOUS
COMMUNITY
Start: 2020-11-29

## 2020-12-07 NOTE — PATIENT INSTRUCTIONS
Pelvic Pain, Female  Pelvic pain is pain in your lower belly (abdomen), below your belly button and between your hips. The pain may start suddenly (be acute), keep coming back (be recurring), or last a long time (become chronic). Pelvic pain that lasts longer than 6 months is called chronic pelvic pain. There are many causes of pelvic pain. Sometimes the cause of pelvic pain is not known.  Follow these instructions at home:    · Take over-the-counter and prescription medicines only as told by your doctor.  · Rest as told by your doctor.  · Do not have sex if it hurts.  · Keep a journal of your pelvic pain. Write down:  ? When the pain started.  ? Where the pain is located.  ? What seems to make the pain better or worse, such as food or your period (menstrual cycle).  ? Any symptoms you have along with the pain.  · Keep all follow-up visits as told by your doctor. This is important.  Contact a doctor if:  · Medicine does not help your pain.  · Your pain comes back.  · You have new symptoms.  · You have unusual discharge or bleeding from your vagina.  · You have a fever or chills.  · You are having trouble pooping (constipation).  · You have blood in your pee (urine) or poop (stool).  · Your pee smells bad.  · You feel weak or light-headed.  Get help right away if:  · You have sudden pain that is very bad.  · Your pain keeps getting worse.  · You have very bad pain and also have any of these symptoms:  ? A fever.  ? Feeling sick to your stomach (nausea).  ? Throwing up (vomiting).  ? Being very sweaty.  · You pass out (lose consciousness).  Summary  · Pelvic pain is pain in your lower belly (abdomen), below your belly button and between your hips.  · There are many possible causes of pelvic pain.  · Keep a journal of your pelvic pain.  This information is not intended to replace advice given to you by your health care provider. Make sure you discuss any questions you have with your health care provider.  Document  Revised: 06/05/2019 Document Reviewed: 06/05/2019  Elsevier Patient Education © 2020 [employer]

## 2020-12-07 NOTE — PROGRESS NOTES
GYN Annual Exam     CC - Here for annual exam.     Subjective     HPI  Nettie Araya is a 45 y.o. female, , who presents for annual well woman exam.  She is premenopausal.   Patient's last menstrual period was 2020..  Periods are irregular occurring every 4 weeks, lasting 4-12 days.  Dysmenorrhea:mild, moderate or severe at times.  Patient reports problems with: pelvic pain.  Partner Status: Marital Status: .  New Partners since last visit: no.  The patient denies vasomotor symptoms.     The patient has any complaints today.  She reports diffuse pelvic pain that has been present for about a year.  The pain is present daily and is fairly constant.  She describes it normally as a sharp and crampy pain and she rates it as a 7.5/10 at its worst.   + Dyspareunia with most episodes of IC.  She does report constipation over the past 6 months and will go 3-4 days or up to a week without a BM.  When she does have a BM it is round, hard balls and she is straining hard to pass stool.  She does report vaginal discharge at times.  She does report some urinary pressure and the inabilty to void well.  No dysuria/frequency.    A CT Scan report was reviewed that was performed on 20 showed a 9.7cm right sided pelvic mass that may represent an ovarian cyst and a 3 cm left ovarian cyst.     She exercises regularly: no.  She has concerns about domestic violence: no.      Additional OB/GYN History   Current contraception: contraceptive methods: Tubal ligation  Desires to: do not start contraception  Last Pap :   Last Completed Pap Smear       Status Date      PAP SMEAR No completions recorded        History of abnormal Pap smear: no  Family history of uterine, colon, breast, or ovarian cancer: no  Performs monthly Self-Breast Exam: yes  Last mammogram:   Last Completed Mammogram     Patient has no health maintenance due at this time        Last colonoscopy:   Last Completed Colonoscopy       Status Date       COLONOSCOPY No completions recorded        Last DEXA: Never   Exercises Regularly: no  Feelings of Anxiety or Depression: yes    Tobacco Usage?: No   OB History        3    Para   3    Term   2       1    AB        Living   3       SAB        TAB        Ectopic        Molar        Multiple        Live Births   3                Health Maintenance   Topic Date Due   • Annual Gynecologic Pelvic and Breast Exam  1974   • COLONOSCOPY  1974   • TDAP/TD VACCINES (1 - Tdap) 1993   • HEPATITIS C SCREENING  2017   • ANNUAL WELLNESS VISIT  2017   • PAP SMEAR  2017   • INFLUENZA VACCINE  2020   • Pneumococcal Vaccine 0-64  Aged Out       The additional following portions of the patient's history were reviewed and updated as appropriate: allergies, current medications, past family history, past medical history, past social history, past surgical history and problem list.    Past Medical History:   Diagnosis Date   • Abnormal MRI, shoulder     Right Shoulder MRI 2010 revealed small glenohumeral joint effusion, capsulitis, tendinitis. No rotator cuff for labral tear was noted.    • Abnormal x-ray     X-Ray of the right wrist on 2014, revealed linear sclerosis within the distal radial metaphysis consistent with healed fracture. There is irregularity of the ulnar styloid consistent with prior fracture.     • Anxiety    • Depression    • History of constipation    • History of insomnia     Seconday to pain   • History of migraine headaches    • History of varicose veins    • Pain in joint, shoulder region         Past Surgical History:   Procedure Laterality Date   • BACK SURGERY      SCS implant   • CERVICAL LAMINECTOMY DECOMPRESSION POSTERIOR Bilateral 10/16/2018    Procedure: PARTIAL C2, C3, C4 LAMINECTOMY SPINAL CORD STIMULATOR REMOVAL INSERTION OF SPINAL CORD STIMULATOR;  Surgeon: Jam Pack MD;  Location: [facility];  Service: Neurosurgery   •  " SECTION      x 3   • COLONOSCOPY     • OTHER SURGICAL HISTORY      Left Distal Radiolnar Joint Stabilization and Capsulodesis   • ROTATOR CUFF REPAIR      Rotator cuff repair followed by three surgeries and manipulation under anesthesia with Dr. Ludwig. Fourth surgery performed by Dr. Graves.  Removal of suture and subacromial decompression with lysis of adhesions 2011.   • SPINAL CORD STIMULATOR IMPLANT     • SPINAL CORD STIMULATOR IMPLANT N/A 10/16/2018    Procedure: REMOVAL AND REPLACEMENT SPINAL CORD STIMULATOR;  Surgeon: Jam Pack MD;  Location: [facility];  Service: Neurosurgery   • WRIST SURGERY          Review of Systems   Gastrointestinal: Positive for constipation.   Musculoskeletal: Positive for neck pain (and back pain).       I have reviewed and agree with the HPI, ROS, and historical information as entered above. Sherri Gill MD    Objective   BP 98/58   Temp 97.3 °F (36.3 °C)   Ht 167.6 cm (66\")   Wt 62.1 kg (137 lb)   LMP 2020   Breastfeeding No   BMI 22.11 kg/m²     Physical Exam  Vitals signs and nursing note reviewed. Exam conducted with a chaperone present.   Constitutional:       General: She is awake.   HENT:      Head: Normocephalic and atraumatic.   Neck:      Musculoskeletal: Normal range of motion.      Thyroid: No thyroid mass, thyromegaly or thyroid tenderness.   Cardiovascular:      Rate and Rhythm: Normal rate.      Heart sounds: No murmur. No friction rub. No gallop.    Pulmonary:      Effort: Pulmonary effort is normal.      Breath sounds: Normal breath sounds. No stridor. No wheezing, rhonchi or rales.   Chest:      Chest wall: No mass or tenderness.      Breasts:         Right: Normal. No bleeding, inverted nipple, mass, nipple discharge, skin change or tenderness.         Left: Normal. No bleeding, inverted nipple, mass, nipple discharge, skin change or tenderness.   Abdominal:      Palpations: Abdomen is soft.      Tenderness: There is no guarding " or rebound.      Hernia: There is no hernia in the left inguinal area or right inguinal area.   Genitourinary:     General: Normal vulva.      Pubic Area: No rash.       Labia:         Right: No rash, tenderness, lesion or injury.         Left: No rash, tenderness, lesion or injury.       Urethra: No prolapse, urethral swelling or urethral lesion.      Vagina: No foreign body. No vaginal discharge, erythema, tenderness, bleeding or lesions.      Cervix: No cervical motion tenderness, discharge, friability, lesion, erythema or cervical bleeding.      Uterus: Normal. Deviated (to the left). Not enlarged, not fixed and not tender.       Adnexa:         Right: Mass, tenderness and fullness present.         Left: Tenderness present. No mass or fullness.        Rectum: No external hemorrhoid.      Comments: Pelvic fullness noted on BME, uterus deviated to left and pt very ttp on right, more prominently ttp on right than left, fullness and mass noted in left adnexa, smooth, cystic  Lymphadenopathy:      Upper Body:      Right upper body: No supraclavicular or axillary adenopathy.      Left upper body: No supraclavicular or axillary adenopathy.   Skin:     General: Skin is warm.   Neurological:      Mental Status: She is alert and oriented to person, place, and time.   Psychiatric:         Mood and Affect: Mood and affect normal.         Speech: Speech normal.          Imaging:  A CT Scan report was reviewed that was performed on 9/9/20 showed a 9.7cm right sided pelvic mass that may represent an ovarian cyst and a 3 cm left ovarian cyst.  This was performed at outside hospital.    Ultrasound: Transvaginal ultrasound was performed in the office today.  These images were reviewed and interpreted by me.  No prior ultrasound images were available for comparison.  Images were compared to prior report from CT scan dated in September 2020.  This shows a normal uterus.  The right ovary is noted to have a large simple cyst  measuring 8.9 x 5.6 x 8.7 cm.  The left ovary has a small simple cyst measuring 2.3 x 2.1 x 1.9 cm.  There is a small amount of free fluid    Assessment/Plan     Assessment     Problem List Items Addressed This Visit     None      Visit Diagnoses     Women's annual routine gynecological examination    -  Primary    Relevant Orders    POC Urinalysis Dipstick (Completed)    Pap IG, Rfx HPV ASCU    Suspected UTI        Relevant Orders    Urine Culture - Urine, Urine, Clean Catch    Pelvic mass in female        Relevant Orders    US Non-ob Transvaginal    Pelvic pain        Relevant Orders    Chlamydia trachomatis, Neisseria gonorrhoeae, Trichomonas vaginalis, PCR - Swab, Cervix          Plan     1. Reviewed monthly self breast exams.  Instructed to call with lumps, pain, or breast discharge.  Yearly mammograms ordered.  2. Ordered mammogram today.  3. Recommended use of Vitamin D and getting adequate calcium in her diet. (1500mg)  4. Reviewed exercise as a preventative health measures.   5. Reccommended Flu Vaccine in Fall of each year.  6. Patient with large right ovarian cyst and pelvic pain.  Patient reports pain for approximately a year.  Prior CT scan in September 2020 revealed a 9 cm right adnexal mass.  Imaging today is consistent with this.  Patient reports daily moderate to severe pain.  She also has dyspareunia associated with this.  We will proceed with lab evaluation including CA-125.  Patient desires definitive treatment by laparoscopic right salpingo-oophorectomy.  She will return for preop appointment.    Sherri Gill MD  12/07/2020

## 2020-12-08 ENCOUNTER — LAB (OUTPATIENT)
Dept: LAB | Facility: HOSPITAL | Age: 45
End: 2020-12-08

## 2020-12-08 DIAGNOSIS — R19.00 PELVIC MASS IN FEMALE: ICD-10-CM

## 2020-12-08 DIAGNOSIS — D39.9 NEOPLASM OF UNCERTAIN BEHAVIOR OF FEMALE GENITAL ORGAN, UNSPECIFIED: ICD-10-CM

## 2020-12-08 LAB
BASOPHILS # BLD AUTO: 0.05 10*3/MM3 (ref 0–0.2)
BASOPHILS NFR BLD AUTO: 0.9 % (ref 0–1.5)
DEPRECATED RDW RBC AUTO: 44.4 FL (ref 37–54)
EOSINOPHIL # BLD AUTO: 0.06 10*3/MM3 (ref 0–0.4)
EOSINOPHIL NFR BLD AUTO: 1 % (ref 0.3–6.2)
ERYTHROCYTE [DISTWIDTH] IN BLOOD BY AUTOMATED COUNT: 12.4 % (ref 12.3–15.4)
HCT VFR BLD AUTO: 39.9 % (ref 34–46.6)
HGB BLD-MCNC: 13 G/DL (ref 12–15.9)
IMM GRANULOCYTES # BLD AUTO: 0.01 10*3/MM3 (ref 0–0.05)
IMM GRANULOCYTES NFR BLD AUTO: 0.2 % (ref 0–0.5)
LYMPHOCYTES # BLD AUTO: 1.84 10*3/MM3 (ref 0.7–3.1)
LYMPHOCYTES NFR BLD AUTO: 32.1 % (ref 19.6–45.3)
MCH RBC QN AUTO: 31.6 PG (ref 26.6–33)
MCHC RBC AUTO-ENTMCNC: 32.6 G/DL (ref 31.5–35.7)
MCV RBC AUTO: 97.1 FL (ref 79–97)
MONOCYTES # BLD AUTO: 0.4 10*3/MM3 (ref 0.1–0.9)
MONOCYTES NFR BLD AUTO: 7 % (ref 5–12)
NEUTROPHILS NFR BLD AUTO: 3.38 10*3/MM3 (ref 1.7–7)
NEUTROPHILS NFR BLD AUTO: 58.8 % (ref 42.7–76)
NRBC BLD AUTO-RTO: 0 /100 WBC (ref 0–0.2)
PLATELET # BLD AUTO: 240 10*3/MM3 (ref 140–450)
PMV BLD AUTO: 10.6 FL (ref 6–12)
RBC # BLD AUTO: 4.11 10*6/MM3 (ref 3.77–5.28)
WBC # BLD AUTO: 5.74 10*3/MM3 (ref 3.4–10.8)

## 2020-12-08 PROCEDURE — 85025 COMPLETE CBC W/AUTO DIFF WBC: CPT

## 2020-12-08 PROCEDURE — 87186 SC STD MICRODIL/AGAR DIL: CPT | Performed by: OBSTETRICS & GYNECOLOGY

## 2020-12-08 PROCEDURE — 86304 IMMUNOASSAY TUMOR CA 125: CPT

## 2020-12-08 PROCEDURE — 87088 URINE BACTERIA CULTURE: CPT | Performed by: OBSTETRICS & GYNECOLOGY

## 2020-12-08 PROCEDURE — 87086 URINE CULTURE/COLONY COUNT: CPT | Performed by: OBSTETRICS & GYNECOLOGY

## 2020-12-09 LAB
C TRACH RRNA SPEC QL NAA+PROBE: NEGATIVE
CANCER AG125 SERPL QL: 9.6 U/ML (ref 0–38.1)
N GONORRHOEA RRNA SPEC QL NAA+PROBE: NEGATIVE
T VAGINALIS DNA SPEC QL NAA+PROBE: NEGATIVE

## 2020-12-10 ENCOUNTER — OFFICE VISIT (OUTPATIENT)
Dept: OBSTETRICS AND GYNECOLOGY | Facility: CLINIC | Age: 45
End: 2020-12-10

## 2020-12-10 VITALS
SYSTOLIC BLOOD PRESSURE: 90 MMHG | DIASTOLIC BLOOD PRESSURE: 54 MMHG | WEIGHT: 138.1 LBS | BODY MASS INDEX: 22.19 KG/M2 | HEIGHT: 66 IN

## 2020-12-10 DIAGNOSIS — R10.2 PELVIC PAIN: ICD-10-CM

## 2020-12-10 DIAGNOSIS — N83.201 CYST OF RIGHT OVARY: Primary | ICD-10-CM

## 2020-12-10 DIAGNOSIS — N30.00 ACUTE CYSTITIS WITHOUT HEMATURIA: ICD-10-CM

## 2020-12-10 LAB
BACTERIA SPEC AEROBE CULT: ABNORMAL
BACTERIA UR CULT: ABNORMAL
Lab: NORMAL
OTHER ANTIBIOTIC SUSC ISLT: ABNORMAL

## 2020-12-10 PROCEDURE — 99213 OFFICE O/P EST LOW 20 MIN: CPT | Performed by: OBSTETRICS & GYNECOLOGY

## 2020-12-10 RX ORDER — IBUPROFEN 800 MG/1
800 TABLET ORAL EVERY 8 HOURS PRN
Qty: 30 TABLET | Refills: 0 | Status: SHIPPED | OUTPATIENT
Start: 2020-12-10 | End: 2021-02-24

## 2020-12-10 RX ORDER — AMOXICILLIN 500 MG/1
500 CAPSULE ORAL 2 TIMES DAILY
Qty: 14 CAPSULE | Refills: 0 | Status: SHIPPED | OUTPATIENT
Start: 2020-12-10 | End: 2021-03-22

## 2020-12-10 RX ORDER — SULFAMETHOXAZOLE AND TRIMETHOPRIM 800; 160 MG/1; MG/1
1 TABLET ORAL 2 TIMES DAILY
Qty: 6 TABLET | Refills: 0 | Status: SHIPPED | OUTPATIENT
Start: 2020-12-10 | End: 2020-12-10 | Stop reason: SDUPTHER

## 2020-12-10 RX ORDER — OXYCODONE HYDROCHLORIDE AND ACETAMINOPHEN 5; 325 MG/1; MG/1
1-2 TABLET ORAL EVERY 4 HOURS PRN
Qty: 24 TABLET | Refills: 0 | Status: SHIPPED | OUTPATIENT
Start: 2020-12-10 | End: 2020-12-15

## 2020-12-10 NOTE — PROGRESS NOTES
Subjective   Nettie Araya is a 45 y.o. year old  who is scheduled  for surgery due to a right ovarian simple cyst measuring 8.9 x 5.6 x 8.7 cm.  She is scheduled for 12/15/2020 at 1100am (arrive at 9:45am) with COVID testing scheduled for 2020 at 1345 on [address]. Her LMP was 2020.  Her birth control method is a tubal ligation,  Her BMI is 22.29 kg/m2.      She has reviewed the informational video on laparoscopic procedures.    She has reviewed the informational pamphlet on laparoscopic procedures.    She understands the risks of bleeding, infection, possible damage to other organ systems, including but not limited to the gastrointestinal tract and genitourinary tract.  She also understands the specific risks listed in the preop information (video, pamphlets, etc.).    She has reviewed and signed the preop consent form.    She has been instructed to have a light dinner the night before surgery, then nothing to eat or drink after midnight.  The day of surgery do not chew gum or smoke.  Remove all jewelry, nail polish, contact lenses prior to coming to the hospital.  Do not bring valuables or large sums of money with you. Patient was instructed on what time to arrive and where to check in, maps were given.  She was instructed that she will meet an Anesthesiologist and that an IV will be started to provide fluids and sedation.  The total time of procedure was discussed.  She was instructed that she will need a .      She has confirmed that she is not allergic to Latex.       Past Medical History:   Diagnosis Date   • Abnormal MRI, shoulder     Right Shoulder MRI 2010 revealed small glenohumeral joint effusion, capsulitis, tendinitis. No rotator cuff for labral tear was noted.    • Abnormal x-ray     X-Ray of the right wrist on 2014, revealed linear sclerosis within the distal radial metaphysis consistent with healed fracture. There is irregularity of the ulnar styloid consistent  with prior fracture.     • Anxiety    • Depression    • History of constipation    • History of insomnia     Seconday to pain   • History of migraine headaches    • History of varicose veins    • Pain in joint, shoulder region      Past Surgical History:   Procedure Laterality Date   • BACK SURGERY      SCS implant   • CERVICAL LAMINECTOMY DECOMPRESSION POSTERIOR Bilateral 10/16/2018    Procedure: PARTIAL C2, C3, C4 LAMINECTOMY SPINAL CORD STIMULATOR REMOVAL INSERTION OF SPINAL CORD STIMULATOR;  Surgeon: Jma Pack MD;  Location:  [facility];  Service: Neurosurgery   • SPINAL CORD STIMULATOR IMPLANT N/A 10/16/2018    Procedure: REMOVAL AND REPLACEMENT SPINAL CORD STIMULATOR;  Surgeon: Jam Pack MD;  Location:  [facility];  Service: Neurosurgery   •  SECTION      x 3   • COLONOSCOPY     • OTHER SURGICAL HISTORY      Left Distal Radiolnar Joint Stabilization and Capsulodesis   • ROTATOR CUFF REPAIR      Rotator cuff repair followed by three surgeries and manipulation under anesthesia with Dr. Ludwig. Fourth surgery performed by Dr. Graves.  Removal of suture and subacromial decompression with lysis of adhesions 2011.   • TUBAL ABDOMINAL LIGATION     • WRIST SURGERY       OB History    Para Term  AB Living   3 3 2 1 0 3   SAB TAB Ectopic Molar Multiple Live Births   0 0 0 0 0 3      # Outcome Date GA Lbr Kevin/2nd Weight Sex Delivery Anes PTL Lv   3 Term      CS-Unspec   HUMERA   2 Term      CS-Unspec   HUMERA   1        - P   HUMERA     Social History     Tobacco Use   Smoking Status Never Smoker   Smokeless Tobacco Never Used     Social History     Substance and Sexual Activity   Alcohol Use No     Social History     Substance and Sexual Activity   Drug Use No     Prior to Admission medications    Medication Sig Start Date End Date Taking? Authorizing Provider   Aimovig 140 MG/ML prefilled syringe  20   Provider, MD Bennie   amLODIPine (NORVASC) 2.5 MG tablet  Take 2.5 mg by mouth Daily. 4/7/14   Bennie Ramos MD   baclofen (LIORESAL) 10 MG tablet Take 1 tablet by mouth 4 (Four) Times a Day. 7/30/20   Corrina Holley APRN   benzonatate (TESSALON) 100 MG capsule TK 1 C PO TID PRF COUGH 10/30/20   Bennie Ramos MD   buPROPion SR (WELLBUTRIN SR) 150 MG 12 hr tablet Take 150 mg by mouth Every 12 (Twelve) Hours. 4/13/16   Bennie Ramos MD   chlorthalidone (HYGROTEN) 50 MG tablet Take 1 tablet by mouth daily. 3/23/14   Bennie Ramos MD   diclofenac (VOLTAREN) 50 MG EC tablet TAKE 1 TABLET BY MOUTH TWICE DAILY 7/19/20   Corrina Holley APRN   DULoxetine (CYMBALTA) 60 MG capsule TAKE 1 CAPSULE BY MOUTH EVERY DAY 9/28/20   Corrina Holley APRN   furosemide (LASIX) 20 MG tablet Take 1 tablet by mouth daily. 3/6/14   Bennie Ramos MD   Gel Base gel Lidocaine 10% Clonidine 0.1% Baclofen 5% Cyclobenzaprine 4% Amitriptyline 2% Gabapentin 10% Prilocaine 2% gel. Apply 3-4 times daily prn 6/15/20   Corrina Holley APRN   GRALISE 600 MG tablet tablet TAKE 1 TABLET BY MOUTH DAILY BEFORE SUPPER 7/19/20   Corrina Holley APRN   LORazepam (ATIVAN) 1 MG tablet Take 1 tablet by mouth Every 8 (Eight) Hours As Needed for Anxiety. 8/26/14   Bennie Ramos MD   nortriptyline (PAMELOR) 10 MG capsule TAKE 1 TO 2 CAPSULES BY MOUTH EVERY NIGHT 10/12/20   Corrina Holley APRN   omeprazole (priLOSEC) 20 MG capsule Take 20 mg by mouth Daily.    Bennie Ramos MD   ondansetron (ZOFRAN) 4 MG tablet Take 4 mg by mouth Every 8 (Eight) Hours As Needed for Nausea or Vomiting.    Bennie Ramos MD   potassium chloride ER (K-TAB) 20 MEQ tablet controlled-release ER tablet Take 1 tablet by mouth Daily.  Patient taking differently: Take 40 mEq by mouth Daily. 3/28/17   Gilberto Robles MD   traZODone (DESYREL) 100 MG tablet Take 100 mg by mouth every night at bedtime. 11/11/20   Provider, MD Bennie   Zipsor 25 MG capsule TAKE 1 CAPSULE BY MOUTH  "FOUR TIMES A DAY IF NEEDED 8/31/20   Provider, MD Bennie   zolpidem (AMBIEN) 10 MG tablet TAKE 1 TABLET ONCE A DAY AT BEDTIME 1/18/17   Provider, MD Bennie     No Known Allergies    Review of Systems   Genitourinary: Positive for pelvic pain.   All other systems reviewed and are negative.        Objective   BP 90/54 (BP Location: Left arm, Patient Position: Sitting, Cuff Size: Adult)   Ht 167.6 cm (66\")   Wt 62.6 kg (138 lb 1.6 oz)   LMP 11/23/2020 (Exact Date)   Breastfeeding No   BMI 22.29 kg/m²   General: well developed; well nourished  no acute distress   Heart: regular rate and rhythm, S1, S2 normal, no murmur, click, rub or gallop   Lungs: breathing is unlabored  clear to auscultation bilaterally   Abdomen: soft, ttp in bilateral lower quadrants   Pelvis: Not performed.        Assessment    Problems Addressed this Visit     None      Visit Diagnoses     Cyst of right ovary    -  Primary    Relevant Medications    ibuprofen (ADVIL,MOTRIN) 800 MG tablet    oxyCODONE-acetaminophen (Percocet) 5-325 MG per tablet    Other Relevant Orders    Comprehensive Metabolic Panel    Pelvic pain        Acute cystitis without hematuria        Relevant Medications    amoxicillin (AMOXIL) 500 MG capsule      Diagnoses       Codes Comments    Cyst of right ovary    -  Primary ICD-10-CM: N83.201  ICD-9-CM: 620.2     Pelvic pain     ICD-10-CM: R10.2  ICD-9-CM: PQG4349     Acute cystitis without hematuria     ICD-10-CM: N30.00  ICD-9-CM: 595.0              Plan   1. Pt with pelvic mass noted on CT, pelvic pain x 1 year.  U/S confirmed 9 cm ovarian mass, likely from right ovary.  This was consistent with her CAT scan that was done 3 months ago.  Patient desires definitive surgical treatment by laparoscopic unilateral salpingo-oophorectomy.  She understands that although we feel it is the right ovary based on ultrasound and pelvic imaging this may actually be the left ovary.  We plan to remove the abnormal " ovary.  2. Risks of surgery were reviewed with the patient including risks of bleeding, infection, damage to other organ systems including, but not limited to GI and  tracts (bowel, bladder, blood vessels, nerves) risks of Anesthesia, as well as the risk the surgery will not produce the desired results, possible need for additional surgery, death, risk of uterine perforation.  3. PAT Scheduled    4. Chapo has been obtained and reviewed   5. Pain Medication Consent Form has been signed.  A review regarding proper medication administration, impact on driving and working while medicated, the safety of use in pregnancy, the potential for overdose and the proper disposal and storage of controlled medications has been done with the patient.          Sherri Gill MD  12/10/2020

## 2020-12-13 ENCOUNTER — APPOINTMENT (OUTPATIENT)
Dept: PREADMISSION TESTING | Facility: HOSPITAL | Age: 45
End: 2020-12-13

## 2020-12-13 PROCEDURE — U0004 COV-19 TEST NON-CDC HGH THRU: HCPCS

## 2020-12-13 PROCEDURE — C9803 HOPD COVID-19 SPEC COLLECT: HCPCS

## 2020-12-14 LAB — SARS-COV-2 RNA RESP QL NAA+PROBE: NOT DETECTED

## 2020-12-15 ENCOUNTER — LAB REQUISITION (OUTPATIENT)
Dept: LAB | Facility: HOSPITAL | Age: 45
End: 2020-12-15

## 2020-12-15 ENCOUNTER — TELEPHONE (OUTPATIENT)
Dept: PAIN MEDICINE | Facility: CLINIC | Age: 45
End: 2020-12-15

## 2020-12-15 ENCOUNTER — OUTSIDE FACILITY SERVICE (OUTPATIENT)
Dept: OBSTETRICS AND GYNECOLOGY | Facility: CLINIC | Age: 45
End: 2020-12-15

## 2020-12-15 DIAGNOSIS — R10.2 PELVIC AND PERINEAL PAIN: ICD-10-CM

## 2020-12-15 PROCEDURE — 58661 LAPAROSCOPY REMOVE ADNEXA: CPT | Performed by: OBSTETRICS & GYNECOLOGY

## 2020-12-15 PROCEDURE — 88307 TISSUE EXAM BY PATHOLOGIST: CPT | Performed by: OBSTETRICS & GYNECOLOGY

## 2020-12-15 NOTE — TELEPHONE ENCOUNTER
Called patient to reschedule her NS today, LVM for patient to call us back to schedule.    Called [employer] to notify them that she has No Showed and once she reschedules we will contact them so they when to expect the drug screen for workers comp

## 2020-12-16 DIAGNOSIS — Z01.419 WOMEN'S ANNUAL ROUTINE GYNECOLOGICAL EXAMINATION: ICD-10-CM

## 2020-12-16 LAB
CYTO UR: NORMAL
LAB AP CASE REPORT: NORMAL
LAB AP CLINICAL INFORMATION: NORMAL
PATH REPORT.FINAL DX SPEC: NORMAL
PATH REPORT.GROSS SPEC: NORMAL

## 2020-12-18 ENCOUNTER — TELEPHONE (OUTPATIENT)
Dept: OBSTETRICS AND GYNECOLOGY | Facility: CLINIC | Age: 45
End: 2020-12-18

## 2020-12-21 ENCOUNTER — TELEPHONE (OUTPATIENT)
Dept: PAIN MEDICINE | Facility: CLINIC | Age: 45
End: 2020-12-21

## 2020-12-21 ENCOUNTER — OFFICE VISIT (OUTPATIENT)
Dept: OBSTETRICS AND GYNECOLOGY | Facility: CLINIC | Age: 45
End: 2020-12-21

## 2020-12-21 VITALS
BODY MASS INDEX: 21.69 KG/M2 | SYSTOLIC BLOOD PRESSURE: 98 MMHG | TEMPERATURE: 97.7 F | DIASTOLIC BLOOD PRESSURE: 58 MMHG | HEIGHT: 66 IN | WEIGHT: 135 LBS

## 2020-12-21 DIAGNOSIS — Z09 POSTOPERATIVE FOLLOW-UP: Primary | ICD-10-CM

## 2020-12-21 PROCEDURE — 99024 POSTOP FOLLOW-UP VISIT: CPT | Performed by: OBSTETRICS & GYNECOLOGY

## 2020-12-21 RX ORDER — DOCUSATE SODIUM 250 MG
CAPSULE ORAL
COMMUNITY
Start: 2020-12-16 | End: 2021-04-26

## 2020-12-21 NOTE — PROGRESS NOTES
"Subjective   Chief Complaint   Patient presents with   • Post-op     Nettie Araya is a 45 y.o. year old  presenting to be seen for her post-operative visit.  Currently she reports no problems with eating, voiding, or wound drainage.+ flatus since surgery.  She has not had a BM since surgery and has been taking stool softners BID.     She feels that her pelvic pain is less now than preoperatively.      The pathology results from her procedure are in Nettie's record and are benign.      OTHER THINGS SHE WANTS TO DISCUSS TODAY:  left sided tenderness at the left port site    The following portions of the patient's history were reviewed and updated as appropriate:current medications and allergies       Objective   BP 98/58   Temp 97.7 °F (36.5 °C)   Ht 167.6 cm (66\")   Wt 61.2 kg (135 lb)   LMP 2020 (Exact Date)   Breastfeeding No   BMI 21.79 kg/m²     General:  well developed; well nourished  no acute distress   Abdomen: soft, non-tender; no masses  incision is clean, dry, intact and without drainage   Pelvis: Not performed.          Assessment   1. S/P right salpingo-oophorectomy      Plan   1. OK to drive  2. OK to lift  3. The importance of keeping all planned follow-up and taking all medications as prescribed was emphasized.  4. Follow up for annual exam in 1 year.  5. Patient has not had a bowel movement since surgery.  Constipation was noted intraoperatively and the patient was prescribed stool softeners twice daily which she has been taking.  We discussed increasing her water, fiber and trying an enema at home.  She has had to do enemas a few times at home before.  We also discussed MiraLAX as needed.  She is to call us if her bowel function has not improved.    No orders of the defined types were placed in this encounter.             Sherri Gill MD  2020    "

## 2020-12-21 NOTE — TELEPHONE ENCOUNTER
Tayo called to verify if she had rescheduled her apt. Since she no showed to her last apt. Date. She had not rescheduled so I told them so.

## 2020-12-31 DIAGNOSIS — G90.511 COMPLEX REGIONAL PAIN SYNDROME TYPE 1 OF RIGHT UPPER EXTREMITY: Primary | ICD-10-CM

## 2020-12-31 RX ORDER — DULOXETIN HYDROCHLORIDE 60 MG/1
CAPSULE, DELAYED RELEASE ORAL
Qty: 90 CAPSULE | Refills: 0 | Status: SHIPPED | OUTPATIENT
Start: 2020-12-31 | End: 2021-02-10 | Stop reason: SDUPTHER

## 2021-01-12 DIAGNOSIS — G90.511 COMPLEX REGIONAL PAIN SYNDROME TYPE 1 OF RIGHT UPPER EXTREMITY: Primary | ICD-10-CM

## 2021-01-12 RX ORDER — NORTRIPTYLINE HYDROCHLORIDE 10 MG/1
CAPSULE ORAL
Qty: 180 CAPSULE | Refills: 0 | Status: SHIPPED | OUTPATIENT
Start: 2021-01-12 | End: 2021-02-24 | Stop reason: SDUPTHER

## 2021-01-16 DIAGNOSIS — G90.511 COMPLEX REGIONAL PAIN SYNDROME TYPE 1 OF RIGHT UPPER EXTREMITY: ICD-10-CM

## 2021-01-18 RX ORDER — GABAPENTIN 600 MG/1
TABLET, FILM COATED ORAL
Qty: 30 TABLET | Refills: 0 | Status: SHIPPED | OUTPATIENT
Start: 2021-01-18 | End: 2021-02-15

## 2021-01-20 DIAGNOSIS — G90.511 COMPLEX REGIONAL PAIN SYNDROME TYPE 1 OF RIGHT UPPER EXTREMITY: ICD-10-CM

## 2021-01-20 RX ORDER — GABAPENTIN 600 MG/1
TABLET, FILM COATED ORAL
Qty: 30 TABLET | OUTPATIENT
Start: 2021-01-20

## 2021-01-21 ENCOUNTER — TELEPHONE (OUTPATIENT)
Dept: PAIN MEDICINE | Facility: CLINIC | Age: 46
End: 2021-01-21

## 2021-01-25 ENCOUNTER — TELEPHONE (OUTPATIENT)
Dept: PAIN MEDICINE | Facility: CLINIC | Age: 46
End: 2021-01-25

## 2021-01-25 NOTE — TELEPHONE ENCOUNTER
Spoke with patient. She reports that she is unable to obtain her gralise, because the pharmacy told her that they never received it.   Questioned if there was an issue with the pharmacy billing her WC. She is unsure, she gave number for WC as [phone], but is unsure who her  is.     Appointment rescheduled from no- show in December.       Spoke with Elvin Cummings, at [facility] pharmacy. Verbal order given to match 01/18/2021 Gralise prescription that was escribed, but that they did not receive.   Advised that Gabapentin 600mg BID was called in by PCP on 1/21 as patient was completely out.   Advised that patient could transition back to her Gralise as it was under her workers comp claim.     Spoke with patient and advised that the Gralise should now be at the pharmacy, but not to take Gralise and Gabapentin at the same time.   Understanding verbalized she will  Gralise as soon as possible.

## 2021-01-26 RX ORDER — BACLOFEN 10 MG/1
TABLET ORAL
Qty: 120 TABLET | Refills: 5 | Status: SHIPPED | OUTPATIENT
Start: 2021-01-26 | End: 2021-02-24 | Stop reason: SDUPTHER

## 2021-02-03 ENCOUNTER — TELEPHONE (OUTPATIENT)
Dept: OBSTETRICS AND GYNECOLOGY | Facility: CLINIC | Age: 46
End: 2021-02-03

## 2021-02-09 NOTE — PROGRESS NOTES
"Chief Complaint: \"I have started having quite a bit of pain, and some problems charging my device.\"       History of Present Illness:   Patient: Ms. Nettie Araya, 46 y.o. female was last seen on 06/15/2020 by me for follow-up evaluation and medication refill. Patient returns to the clinic for evaluation of her chronic pain, possible spinal cord stimulator reprogramming, and medication refill. At the time of her last office visit with me she reported she felt her stimulator device had only been giving her 20-25% relief of her hyperesthesia, hyperalgesia and allodynia in her arms, and bilateral cervical pain.  Additionally, today she is reporting she is having quite a bit of complications with her stimulator device, involving the charge components, she reports she typically would charge every 3 to 4 days, now she is charging about every 22 hours, and not experiencing as if her stimulator is on in relieving her symptoms.  She does report an additional fall as she did at her last office visit with me.  Patient also reported bilateral frontal and occipital cervicogenic headaches lasting 24 hours per day, she reported no relief of her daily headaches. She was referred to a neurologist in [place], Dr. Nettie Garcia for daily headaches by her PCP.  She reports she was started on Aimovig at that time, which she reports has began helping, and Ubrelvy. She reports no significant changes in her medical history along with with no new diagnostics.  Radiation of pain: The pain does not radiate. Hyperesthesia, hyperalgesia and allodynia in her arms had been significantly improved with the use of her SCS device and reprogramming today until recently.   Pain intensity today: 7/10 with stimulator \"turned on\"    Average pain intensity last week: 6/10  Pain intensity ranges from: 5/10 to 9/10  Aggravating factors: Pain increases with rotation and and especially extension of the cervical spine, with movement of her upper " "extremities involving her shoulders..   Alleviating factors: Pain decreases with analgesics and rest, and \"my spinal cord stimulator device\".   Associated symptoms:   Patient denies  pain, numbness and weakness in the bilateral upper lower extremities.   Patient denies  any new bladder or bowel problems.   Patient reports difficulties with her balance with a recent fall.     Pain History:   Ms. Nettie Araya, 44 y.o. female has a long standing history of complex regional pain syndrome type 1 of the right shoulder as a result of a work related injury occurring in September 2009.  She underwent explantation of spinal cord stimulator device inserted by Dr. Kaveh Randle on January 20, 2014 after it was discovered by X-rays in 2016 the lead on the left side of the posterior epidural space had migrated.  The patient had a history of multiple falls which could be the cause of the lead migration. On October 16, 2018 she underwent explantation of percutaneous SCS device followed by implantation of a spinal cord stimulator device with Dr. Jam Pack with [employer]. Chloe + Isabel Specify SureScan 2x8 MRI paddle (MRI full body in 1.5 Bee) with the top electrodes projecting at the level of the superior endplate of the C2 vertebral level. IPG: GliaCure with AdaptiveStim (MRI compatible full body). The device was implanted primarily for treatment of CRPS of the upper extremities.     Review of previous therapies and additional medical records:  Nettie Araya has already failed the following measures, including:   Conservative measures: oral analgesics, opioids, physical therapy, chiropractic therapy and TENs   Interventional measures: SGBs, SCS originally implanted in 1/2014 with Medtronic RestoreSensor MRI compatible Sure Scan with two leads with the top electrodes projecting at the level of the superior endplate of C3. On 04/17/2017, bilateral cervical RFTC  Surgical measures: SCS implant revision as " referenced above  Nettie Araya presents with significant comorbidities including anxiety and depression,  engaged in treatment., CRPS, and migraine headaches engaged in treatment.   In terms of current analgesics, Nettie Araya takes: Gralise 600 mg at bedtime, nortriptyline 10-20 mg qhs., Diclofenac, tizanidine 2 mg prn. Patient also takes Ambien, Ativan, Cymbalta, and Wellbutrin. Patient denies any side effects from her medications.   I have reviewed the [facility] Report #[id number] consistent to medication reconciliation.    Global Pain Scale 11-01-  2018 05-22-  2019 11-13-  2019 06-15  2020 02-24  2021      Pain 8 15 17 11 16      Feelings 10 13 17 10 26      Clinical outcomes 15 19 20 13 20      Activities 10 10 11 11 4      GPS Total: 43 57 65 45 66        Diagnostic Studies:    CT CERVICAL SPINE WO CONTRAST-08/28/2018: Spinal cord stimulator leads terminate at the C7 level, unchanged in position since the 2016 comparison radiograph. Imaged portion of the leads appear to be contiguous without disruption. Vertebral body alignment and vertebral body heights are normal. There is minor disc space narrowing and osteophytic spurring. What can be seen intracranially is unremarkable. A 4 mm nodule is visible at the right lung apex. 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  XR SPINE CERVICAL 2 OR 3 VIEWS - 8/1/2018. One lead is seen with the superior electrodes projecting at the level of the superior endplate of the C7 vertebral endplate. Straightening of the usual lordosis. Facets are aligned. Disc space narrowing and osteophytic spurring, unchanged. No appreciable prevertebral soft tissue swelling. Spinal cord stimulator lead terminates at C7. Lung apices are clear.  Right Shoulder MRI 12/2010 revealed small glenohumeral joint effusion, capsulitis, tendinitis. No rotator cuff for labral tear was noted  X-ray of the right wrist on 04/30/2014, revealed linear sclerosis within the distal radial metaphysis consistent with  healed fracture. There is irregularity of the ulnar styloid consistent with prior fracture. Ulnar positive variance. Mild soft tissue swelling about the wrist.     Review of Systems   Musculoskeletal: Positive for arthralgias, back pain, joint swelling, myalgias, neck pain and neck stiffness.   Neurological: Positive for dizziness, tremors, facial asymmetry, weakness, light-headedness, numbness and headaches.   All other systems reviewed and are negative.        Patient Active Problem List   Diagnosis   • Knee pain   • Depression   • Cervical facet arthropathy/cervical spondylosis without myelopathy   • Atrophy, muscle disuse   • Myofascial pain   • Muscle spasm   • Suprascapular entrapment neuropathy of right side   • Frozen shoulder   • Insomnia   • Mild obesity   • Chronic tension-type headache, not intractable   • Physical deconditioning   • Common migraine without aura   • Adrenal nodule (CMS/HCC)   • Lung nodule, multiple   • Encounter for fitting and adjustment of neuropacemaker of spinal cord   • Complex regional pain syndrome type 1 of right upper extremity   • CPRS 1 (complex regional pain syndrome I) of upper limb       Past Medical History:   Diagnosis Date   • Abnormal MRI, shoulder     Right Shoulder MRI 12/2010 revealed small glenohumeral joint effusion, capsulitis, tendinitis. No rotator cuff for labral tear was noted.    • Abnormal x-ray     X-Ray of the right wrist on 04/30/2014, revealed linear sclerosis within the distal radial metaphysis consistent with healed fracture. There is irregularity of the ulnar styloid consistent with prior fracture.     • Anxiety    • Depression    • History of constipation    • History of insomnia     Seconday to pain   • History of migraine headaches    • History of varicose veins    • Pain in joint, shoulder region          Past Surgical History:   Procedure Laterality Date   • BACK SURGERY  2014    SCS implant   • CERVICAL LAMINECTOMY DECOMPRESSION POSTERIOR  Bilateral 10/16/2018    Procedure: PARTIAL C2, C3, C4 LAMINECTOMY SPINAL CORD STIMULATOR REMOVAL INSERTION OF SPINAL CORD STIMULATOR;  Surgeon: Jam Pack MD;  Location: [facility] OR;  Service: Neurosurgery   •  SECTION      x 3   • COLONOSCOPY     • OTHER SURGICAL HISTORY      Left Distal Radiolnar Joint Stabilization and Capsulodesis   • ROTATOR CUFF REPAIR      Rotator cuff repair followed by three surgeries and manipulation under anesthesia with Dr. Ludwig. Fourth surgery performed by Dr. Graves.  Removal of suture and subacromial decompression with lysis of adhesions 2011.   • SALPINGO OOPHORECTOMY Right 12/15/2020   • SPINAL CORD STIMULATOR IMPLANT N/A 10/16/2018    Procedure: REMOVAL AND REPLACEMENT SPINAL CORD STIMULATOR;  Surgeon: Jam Pack MD;  Location: [facility] OR;  Service: Neurosurgery   • TUBAL ABDOMINAL LIGATION     • WRIST SURGERY           Family History   Problem Relation Age of Onset   • Diabetes Mother    • Hypertension Mother    • Stroke Mother    • Lymphoma Father    • Lung cancer Father    • Brain cancer Father    • Liver cancer Father    • Heart disease Maternal Grandmother          Social History     Socioeconomic History   • Marital status:      Spouse name: Not on file   • Number of children: Not on file   • Years of education: Not on file   • Highest education level: Not on file   Tobacco Use   • Smoking status: Never Smoker   • Smokeless tobacco: Never Used   Substance and Sexual Activity   • Alcohol use: No   • Drug use: No   • Sexual activity: Yes     Partners: Male     Birth control/protection: Tubal ligation           Current Outpatient Medications:   •  Aimovig 140 MG/ML prefilled syringe, , Disp: , Rfl:   •  amLODIPine (NORVASC) 2.5 MG tablet, Take 2.5 mg by mouth Daily., Disp: , Rfl:   •  baclofen (LIORESAL) 10 MG tablet, TAKE 1 TABLET BY MOUTH FOUR TIMES DAILY, Disp: 120 tablet, Rfl: 5  •  buPROPion SR (WELLBUTRIN SR) 150 MG 12 hr tablet, Take 150  mg by mouth Every 12 (Twelve) Hours., Disp: , Rfl: 0  •  chlorthalidone (HYGROTEN) 50 MG tablet, Take 1 tablet by mouth daily., Disp: , Rfl:   •  diclofenac (VOLTAREN) 50 MG EC tablet, TAKE 1 TABLET BY MOUTH TWICE DAILY, Disp: 60 tablet, Rfl: 5  •  docusate sodium (COLACE) 250 MG capsule, TAKE 1 CAPSULE BY MOUTH TWICE A DAY AS NEEDED FOR CONSTIPATION, Disp: , Rfl:   •  DULoxetine (CYMBALTA) 60 MG capsule, TAKE 1 CAPSULE BY MOUTH EVERY DAY, Disp: 90 capsule, Rfl: 0  •  furosemide (LASIX) 20 MG tablet, Take 1 tablet by mouth daily., Disp: , Rfl:   •  Gel Base gel, Lidocaine 10% Clonidine 0.1% Baclofen 5% Cyclobenzaprine 4% Amitriptyline 2% Gabapentin 10% Prilocaine 2% gel. Apply 3-4 times daily prn, Disp: 240 g, Rfl: prn  •  Gralise 600 MG tablet tablet, TAKE 1 TABLET BY MOUTH DAILY BEFORE SUPPER, Disp: 30 tablet, Rfl: 0  •  LORazepam (ATIVAN) 1 MG tablet, Take 1 tablet by mouth Every 8 (Eight) Hours As Needed for Anxiety., Disp: , Rfl:   •  nortriptyline (PAMELOR) 10 MG capsule, TAKE 1 TO 2 CAPSULES BY MOUTH EVERY NIGHT, Disp: 180 capsule, Rfl: 0  •  omeprazole (priLOSEC) 20 MG capsule, Take 20 mg by mouth Daily., Disp: , Rfl:   •  ondansetron (ZOFRAN) 4 MG tablet, Take 4 mg by mouth Every 8 (Eight) Hours As Needed for Nausea or Vomiting., Disp: , Rfl:   •  traZODone (DESYREL) 100 MG tablet, Take 100 mg by mouth every night at bedtime., Disp: , Rfl:   •  zolpidem (AMBIEN) 10 MG tablet, TAKE 1 TABLET ONCE A DAY AT BEDTIME, Disp: , Rfl: 0  •  amoxicillin (AMOXIL) 500 MG capsule, Take 1 capsule by mouth 2 (Two) Times a Day., Disp: 14 capsule, Rfl: 0  •  benzonatate (TESSALON) 100 MG capsule, TK 1 C PO TID PRF COUGH, Disp: , Rfl:   •  ibuprofen (ADVIL,MOTRIN) 800 MG tablet, Take 1 tablet by mouth Every 8 (Eight) Hours As Needed for Moderate Pain ., Disp: 30 tablet, Rfl: 0  •  potassium chloride ER (K-TAB) 20 MEQ tablet controlled-release ER tablet, Take 1 tablet by mouth Daily. (Patient taking differently: Take 40 mEq by  "mouth Daily.), Disp: 7 tablet, Rfl: 0  •  Zipsor 25 MG capsule, TAKE 1 CAPSULE BY MOUTH FOUR TIMES A DAY IF NEEDED, Disp: , Rfl:       No Known Allergies      BP 93/64 (BP Location: Left arm, Patient Position: Sitting, Cuff Size: Adult)   Pulse 77   Ht 167.6 cm (66\")   Wt 58.8 kg (129 lb 9.6 oz)   SpO2 100%   BMI 20.92 kg/m²       Physical Exam:  Constitutional: Patient is oriented to person, place, and time.   Patient appears well-developed and well-nourished.   Head: Normocephalic and atraumatic. Eyes: Conjunctivae and lids are normal. Pupils: Equal, round, reactive to light.   Neck: Trachea normal. Neck supple. No JVD present.   Lymphatic: No cervical adenopathy  Pulmonary Respiratory effort: No increased work of breathing or signs of respiratory distress. Auscultation of lungs: Clear to auscultation.   Cardiovascular Auscultation of heart: Normal rate and rhythm, normal S1 and S2, no murmurs.   Peripheral vascular exam: Normal.   Abdomen: The abdomen was soft and nontender. Bowel sounds were normal.   Musculoskeletal   Gait and station: Gait evaluation demonstrated a normal gait   Cervical spine: Passive and active range of motion are limited secondary to pain. Extension, flexion, lateral flexion, rotation of the cervical spine increased and reproduced pain. Cervical facet joint loading maneuvers are positive.  Muscles: Presence of active trigger points levator scapulae .  Tenderness noted in the bilateral occipital and temporalis regions.  Shoulders: The range of motion of the glenohumeral joints is limited secondary to pain. Rotator cuff strength is 5/5.   Lumbar spine: Passive and active range of motion are full and without pain.   Neurological:   Patient is alert and oriented to person, place, and time.   Speech: speech is normal.   Cortical function: Normal mental status.   Cranial nerves: Cranial nerves 2-12 intact.   Reflex Scores:  Right brachioradialis: 3+  Left brachioradialis: 3+  Right biceps: " 3+  Left biceps: 3+  Right triceps: 3+  Left triceps: 3+  Right patellar: 3+  Left patellar: 3+  Right Achilles: 3+  Left Achilles: 3+  Motor strength: 5/5  Motor Tone: normal tone.   Involuntary movements: none.   Superficial/Primitive Reflexes: primitive reflexes were absent.   Right Downs: absent  Left Downs: absent  Right ankle clonus: absent  Left ankle clonus: absent   Babinsky: absent  No nuchal rigidity.  Spurling sign is negative. Neck tornado test is negative. Lhermitte sign is negative. Negative long tract signs.   Sensation: No sensory loss. Sensory exam: intact to light touch, intact pain and temperature sensation, intact vibration sensation and normal proprioception. There is a reduction in the hyperalgesia, hyperesthesia, and allodynia in the right forearm and right hand during the examination and with the spinal cord stimulator on and reprogamming complete. Minimal hyperalgesia, hyperesthesia, or allodynia on the left shoulder and left arm.  Coordination: Normal finger to nose and heel to shin. Normal balance and negative Romberg's sign   Skin and subcutaneous tissue: Spinal cord stimulator site: There is no redness, drainage, or fluid accumulation at the surgical sites. There is no tenderness upon mobilization of the IPG in the IPG pocketSkin is warm and intact. No rash noted. No cyanosis.   Psychiatric: Judgment and insight: Normal. Orientation to person, place and time: Normal. Recent and remote memory: Intact. Mood and affect: Normal.     PROCEDURE: Analysis of the spinal cord stimulator device with complex reprogramming  Analysis of the stimulator reveals that patient used the [employer] spinal cord stimulator device average 49% of the time. Analysis of impedence reveals normal impedence for all contacts. The spinal cord simulator device was reprogrammed under my direct supervision and 2 new programs were created by adjusting electrode polarities, amplitude, pulse width, pulse rate, in the  following fashion:      Program A1   Electrode polarities: 4+,7-, 12+, 15-  Amplitude: 2.5 mA     Pulse width: 600 mcs  Rate: 40 Hz     Program A2 (Preferred Program)  Electrode polarities: 4+,5-, 6-, 7-, 12+, 13+  Amplitude: 5.6 mA     Pulse width: 700 mcs  Rate: 40 Hz    Time spent reprogramming 20 minutes.    ASSESSMENT:   1. Complex regional pain syndrome type 1 of right upper extremity    2. Cervical facet arthropathy/cervical spondylosis without myelopathy    3. Myofascial pain    4. Suprascapular entrapment neuropathy of right side    5. Presence of neurostimulator    6. Encounter for fitting and adjustment of neuropacemaker of spinal cord        PLAN/MEDICAL DECISION MAKING: I had a lengthy conversation with Ms. Nettie Araya regarding her chronic pain condition and potential therapeutic options including risks, benefits, alternative therapies, to name a few. She sees neurologist Dr. Nettie Garcia for her headaches.  She reports she was started on Aimovig, which she reports has has began helping.  Today she is reporting she is having quite a bit of complications with her stimulator device, involving the charge components, she reports she typically would charge every 3 to 4 days, now she is charging about every 22 hours, and not experiencing as if her stimulator is on and relieving her symptoms.  She does report an additional fall as she did at her last office visit with me. We have added two new programs for her today, a [employer] representative is going to set her up with a new remote, as hers given her problems.  I am also going to obtain some x-rays of her cervical spine not only to assess the leads, but if there are any progression of disease in her neck.  Otherwise, she is doing well with current pain management therapy plan, SCS, and pharmacological regimen.  She has been in full compliance with medication regimen, SCS device, and follow-up evaluations.  I have reviewed all available patient's  medical records as well as previous therapies as referenced above.  Therefore, I have proposed the following plan:  1. Follow up PRN for SCS reprogramming.  2. Follow up in 6 months for medication refill.  2. Cervical spine xrays to assess SCS leads  3. Pharmacological measures: Reviewed and discussed. Patient also takes Ambien, Ativan, Cymbalta and Wellbutrin. Patient denies any side effects from her medications.   A. Continue Gralise 600 mg with evening meals.  B. Continue nortriptyline 10-20 mg at bedtime.  C. Continue baclofen 10 mg 4 times daily as needed for muscle spasms.  D. Continue Lidocaine 10%, clonidine 0.1%, baclofen 5%, cyclobenzaprine 4%, amitriptyline 2%, gabapentin 10%, prilocaine 2% gel, apply gel to the right shoulder as needed.  E. Continue Diclofenac 50 mg two times daily only when necessary for breakthrough pain. Patient has been instructed again not to take any other NSAIDs.  4. Long-term rehabilitation efforts:  A. The patient has a history of recent falls. I did complete a risk assessment for falls.   B. Start an exercise program such as water therapy, daily walks for fitness and progressive strength training  5. The patient has been instructed to contact my office with any questions or difficulties. The patient understands the plan and agrees to proceed accordingly.         Patient Care Team:  Randy Gonzales MD as PCP - General (Family Medicine)  Kaveh Randle MD as Consulting Physician (Pain Medicine)  Cielo Hernandez MD as Consulting Physician (Neurology)  Nettie Garcia DO as Consulting Physician (Neurology)  Jam Pack MD as Consulting Physician (Neurosurgery)  Nhi Plascencia APRN as Nurse Practitioner (Family Medicine)  Domingo Spear PA-C as Physician Assistant (Physician Assistant)  Sherri Gill MD as Gynecologist (Obstetrics and Gynecology)     No orders of the defined types were placed in this encounter.        Future Appointments   Date  Time Provider Department Center   3/8/2021  1:00 PM JASMINA GENARO [facility]  JASMINA GE BR LAURO [place] Dragon/Transcription disclaimer:  Much of this encounter note is an electronic transcription of spoken language to printed text. Electronic transcription of spoken language may permit erroneous, or at times, nonsensical words or phrases to be inadvertently transcribed. Although I have reviewed the note for such errors, some may still exist.

## 2021-02-10 DIAGNOSIS — G90.511 COMPLEX REGIONAL PAIN SYNDROME TYPE 1 OF RIGHT UPPER EXTREMITY: ICD-10-CM

## 2021-02-10 RX ORDER — DULOXETIN HYDROCHLORIDE 60 MG/1
CAPSULE, DELAYED RELEASE ORAL
Qty: 90 CAPSULE | Refills: 0 | Status: SHIPPED | OUTPATIENT
Start: 2021-02-10 | End: 2021-02-24 | Stop reason: SDUPTHER

## 2021-02-15 DIAGNOSIS — G90.511 COMPLEX REGIONAL PAIN SYNDROME TYPE 1 OF RIGHT UPPER EXTREMITY: ICD-10-CM

## 2021-02-15 RX ORDER — GABAPENTIN 600 MG/1
TABLET, FILM COATED ORAL
Qty: 30 TABLET | Refills: 0 | Status: SHIPPED | OUTPATIENT
Start: 2021-02-15 | End: 2021-02-24 | Stop reason: SDUPTHER

## 2021-02-24 ENCOUNTER — OFFICE VISIT (OUTPATIENT)
Dept: PAIN MEDICINE | Facility: CLINIC | Age: 46
End: 2021-02-24

## 2021-02-24 ENCOUNTER — HOSPITAL ENCOUNTER (OUTPATIENT)
Dept: GENERAL RADIOLOGY | Facility: HOSPITAL | Age: 46
Discharge: HOME | End: 2021-02-24
Admitting: NURSE PRACTITIONER

## 2021-02-24 ENCOUNTER — DOCUMENTATION (OUTPATIENT)
Dept: PAIN MEDICINE | Facility: CLINIC | Age: 46
End: 2021-02-24

## 2021-02-24 VITALS
HEIGHT: 66 IN | DIASTOLIC BLOOD PRESSURE: 64 MMHG | SYSTOLIC BLOOD PRESSURE: 93 MMHG | OXYGEN SATURATION: 100 % | BODY MASS INDEX: 20.83 KG/M2 | HEART RATE: 77 BPM | WEIGHT: 129.6 LBS

## 2021-02-24 DIAGNOSIS — M79.18 MYOFASCIAL PAIN: ICD-10-CM

## 2021-02-24 DIAGNOSIS — G90.511 COMPLEX REGIONAL PAIN SYNDROME TYPE 1 OF RIGHT UPPER EXTREMITY: ICD-10-CM

## 2021-02-24 DIAGNOSIS — Z96.82 PRESENCE OF NEUROSTIMULATOR: ICD-10-CM

## 2021-02-24 DIAGNOSIS — M47.812 FACET ARTHROPATHY, CERVICAL: ICD-10-CM

## 2021-02-24 DIAGNOSIS — Z46.2 ENCOUNTER FOR FITTING AND ADJUSTMENT OF NEUROPACEMAKER OF SPINAL CORD: ICD-10-CM

## 2021-02-24 DIAGNOSIS — G56.81 SUPRASCAPULAR ENTRAPMENT NEUROPATHY OF RIGHT SIDE: ICD-10-CM

## 2021-02-24 PROCEDURE — 95972 ALYS CPLX SP/PN NPGT W/PRGRM: CPT | Performed by: NURSE PRACTITIONER

## 2021-02-24 PROCEDURE — 99213 OFFICE O/P EST LOW 20 MIN: CPT | Performed by: NURSE PRACTITIONER

## 2021-02-24 PROCEDURE — 72050 X-RAY EXAM NECK SPINE 4/5VWS: CPT

## 2021-02-24 RX ORDER — GABAPENTIN 600 MG/1
600 TABLET, FILM COATED ORAL DAILY
Qty: 30 TABLET | Refills: 5 | Status: SHIPPED | OUTPATIENT
Start: 2021-02-24 | End: 2021-06-01

## 2021-02-24 RX ORDER — NORTRIPTYLINE HYDROCHLORIDE 10 MG/1
10 CAPSULE ORAL NIGHTLY
Qty: 180 CAPSULE | Refills: 1 | Status: SHIPPED | OUTPATIENT
Start: 2021-02-24 | End: 2021-03-01

## 2021-02-24 RX ORDER — DULOXETIN HYDROCHLORIDE 60 MG/1
60 CAPSULE, DELAYED RELEASE ORAL DAILY
Qty: 90 CAPSULE | Refills: 0 | Status: SHIPPED | OUTPATIENT
Start: 2021-02-24 | End: 2021-07-13

## 2021-02-24 RX ORDER — BACLOFEN 10 MG/1
10 TABLET ORAL 4 TIMES DAILY
Qty: 120 TABLET | Refills: 5 | Status: SHIPPED | OUTPATIENT
Start: 2021-02-24 | End: 2021-10-04

## 2021-02-24 NOTE — PROGRESS NOTES
Oral fluid drug testing complete for patient for workers comp at 100pm.   Spoke with Gris at [facility] [phone]. [employer] is supposed to  specimen tomorrow. Specimen collection paper copy goldenrod given to patient, pink copy kept for office, white copy placed in specimen bag according to directions. Medication changes were noted on sheet.

## 2021-03-01 ENCOUNTER — TELEPHONE (OUTPATIENT)
Dept: PAIN MEDICINE | Facility: CLINIC | Age: 46
End: 2021-03-01

## 2021-03-01 DIAGNOSIS — G90.511 COMPLEX REGIONAL PAIN SYNDROME TYPE 1 OF RIGHT UPPER EXTREMITY: Primary | ICD-10-CM

## 2021-03-01 DIAGNOSIS — G90.511 COMPLEX REGIONAL PAIN SYNDROME TYPE 1 OF RIGHT UPPER EXTREMITY: ICD-10-CM

## 2021-03-01 RX ORDER — NORTRIPTYLINE HYDROCHLORIDE 10 MG/1
10 CAPSULE ORAL NIGHTLY
Qty: 60 CAPSULE | Refills: 5 | Status: SHIPPED | OUTPATIENT
Start: 2021-03-01 | End: 2021-09-28

## 2021-03-01 NOTE — TELEPHONE ENCOUNTER
Caller: ROSIE JIANG    Relationship: SELF  Best call back number: [phone]  Medication needed:   Requested Prescriptions      No prescriptions requested or ordered in this encounter     nortriptyline (PAMELOR) 10 MG capsule      When do you need the refill by: ASAP    What details did the patient provide when requesting the medication: PHARMACY HAS RX; HOWEVER [facility] DANIEL[facility] WILL NOT PAY FOR 90 DAY SUPPLY, ONLY 30 DAY SUPPLY  PATIENT CURRENTLY HAS 3 DAY SUPPLY  Does the patient have less than a 3 day supply:  [] Yes  [x] No    What is the patient's preferred pharmacy:    [facility] [address] [place], [place] [address]Y [facility] [place]   [phone]

## 2021-03-01 NOTE — TELEPHONE ENCOUNTER
Provider: MOHINI WALKER  Caller: ROSIE JIANG  Relationship to Patient: SELF  Phone Number: [phone]  Reason for Call: XR SPINE CERVICAL 02/24/21 WOULD LIKE TO DISCUSS RESULTS

## 2021-03-08 ENCOUNTER — APPOINTMENT (OUTPATIENT)
Dept: OTHER | Facility: HOSPITAL | Age: 46
End: 2021-03-08

## 2021-03-08 ENCOUNTER — HOSPITAL ENCOUNTER (OUTPATIENT)
Dept: MAMMOGRAPHY | Facility: HOSPITAL | Age: 46
Discharge: HOME | End: 2021-03-08
Admitting: OBSTETRICS & GYNECOLOGY

## 2021-03-08 DIAGNOSIS — Z12.31 BREAST CANCER SCREENING BY MAMMOGRAM: ICD-10-CM

## 2021-03-08 PROCEDURE — 77063 BREAST TOMOSYNTHESIS BI: CPT

## 2021-03-08 PROCEDURE — 77067 SCR MAMMO BI INCL CAD: CPT

## 2021-03-08 PROCEDURE — 77063 BREAST TOMOSYNTHESIS BI: CPT | Performed by: RADIOLOGY

## 2021-03-08 PROCEDURE — 77067 SCR MAMMO BI INCL CAD: CPT | Performed by: RADIOLOGY

## 2021-03-11 ENCOUNTER — TELEPHONE (OUTPATIENT)
Dept: PAIN MEDICINE | Facility: CLINIC | Age: 46
End: 2021-03-11

## 2021-03-11 NOTE — TELEPHONE ENCOUNTER
Returned call to patient. She reports that she has talked to scheduling for Dr. Pack.   She reports that they told her that patient needs an authorization from  by our office to be seen by Dr. Pack.   She reports that Clare Webbfe @ [phone] is her .

## 2021-03-11 NOTE — TELEPHONE ENCOUNTER
Provider: MOHINI WALKER  Caller: ROSIE JIANG  Relationship to Patient: SELF    Phone Number: [phone]    Reason for Call: PATIENT HAS QUESTIONS FOR MHOINI WALKER OR NURSE STAFF    When was the patient last seen: 02/24/2021

## 2021-03-18 ENCOUNTER — TELEPHONE (OUTPATIENT)
Dept: PAIN MEDICINE | Facility: CLINIC | Age: 46
End: 2021-03-18

## 2021-03-18 DIAGNOSIS — M47.812 CERVICAL SPONDYLOSIS: Primary | ICD-10-CM

## 2021-03-18 NOTE — TELEPHONE ENCOUNTER
PATIENT IS NEEDING TO SPEAK WITH EDI IN REGARDS TO BEING AUTHORIZED FOR HER INSURANCE.     PLEASE ADVISE:   [phone]

## 2021-03-19 ENCOUNTER — HOSPITAL ENCOUNTER (OUTPATIENT)
Dept: CT IMAGING | Facility: HOSPITAL | Age: 46
Discharge: HOME | End: 2021-03-19
Admitting: NEUROLOGICAL SURGERY

## 2021-03-19 DIAGNOSIS — M47.812 CERVICAL SPONDYLOSIS: ICD-10-CM

## 2021-03-19 PROCEDURE — 72125 CT NECK SPINE W/O DYE: CPT

## 2021-03-22 ENCOUNTER — PREP FOR SURGERY (OUTPATIENT)
Dept: OTHER | Facility: HOSPITAL | Age: 46
End: 2021-03-22

## 2021-03-22 ENCOUNTER — OFFICE VISIT (OUTPATIENT)
Dept: NEUROSURGERY | Facility: CLINIC | Age: 46
End: 2021-03-22

## 2021-03-22 VITALS — BODY MASS INDEX: 21.53 KG/M2 | TEMPERATURE: 97.2 F | HEIGHT: 66 IN | WEIGHT: 134 LBS

## 2021-03-22 DIAGNOSIS — T85.192A MALFUNCTION OF SPINAL CORD STIMULATOR, INITIAL ENCOUNTER (HCC): Primary | ICD-10-CM

## 2021-03-22 DIAGNOSIS — G89.4 CHRONIC PAIN SYNDROME: ICD-10-CM

## 2021-03-22 DIAGNOSIS — M54.2 CERVICALGIA: Primary | ICD-10-CM

## 2021-03-22 PROCEDURE — 99214 OFFICE O/P EST MOD 30 MIN: CPT | Performed by: NEUROLOGICAL SURGERY

## 2021-03-22 RX ORDER — HYDROCODONE BITARTRATE AND ACETAMINOPHEN 7.5; 325 MG/1; MG/1
1 TABLET ORAL ONCE
Status: CANCELLED | OUTPATIENT
Start: 2021-03-22 | End: 2021-03-22

## 2021-03-22 RX ORDER — UBROGEPANT 100 MG/1
TABLET ORAL
COMMUNITY
Start: 2021-03-04

## 2021-03-22 RX ORDER — ACETAMINOPHEN 325 MG/1
650 TABLET ORAL ONCE
Status: CANCELLED | OUTPATIENT
Start: 2021-03-22 | End: 2021-03-22

## 2021-03-22 RX ORDER — CEFAZOLIN SODIUM 2 G/100ML
2 INJECTION, SOLUTION INTRAVENOUS ONCE
Status: CANCELLED | OUTPATIENT
Start: 2021-03-22 | End: 2021-03-22

## 2021-03-22 RX ORDER — IBUPROFEN 800 MG/1
800 TABLET ORAL ONCE
Status: CANCELLED | OUTPATIENT
Start: 2021-03-22 | End: 2021-03-22

## 2021-03-22 NOTE — PROGRESS NOTES
NAME: ROSIE JIANG   DOS: 3/22/2021  : 1975  PCP: Randy Gonzales MD    Chief Complaint:    Chief Complaint   Patient presents with   • Malfunction of SCS       History of Present Illness:  46 y.o. female   I saw this 46-year-old female well-known to me for history of recalcitrant pain syndrome.  She underwent cervical placement of a paddle lead in 2018 with really an excellent result.    She suffers from chronic pain syndrome anxiety etc. but has had 6 months of worsening coverage from her stimulator she feels progressive issues throughout her body but denies any bowel bladder issues or walking issues    She is here for evaluation    PMHX  Allergies:  No Known Allergies  Medications    Current Outpatient Medications:   •  Aimovig 140 MG/ML prefilled syringe, , Disp: , Rfl:   •  amLODIPine (NORVASC) 2.5 MG tablet, Take 2.5 mg by mouth Daily., Disp: , Rfl:   •  baclofen (LIORESAL) 10 MG tablet, Take 1 tablet by mouth 4 (Four) Times a Day., Disp: 120 tablet, Rfl: 5  •  buPROPion SR (WELLBUTRIN SR) 150 MG 12 hr tablet, Take 150 mg by mouth Every 12 (Twelve) Hours., Disp: , Rfl: 0  •  chlorthalidone (HYGROTEN) 50 MG tablet, Take 1 tablet by mouth daily., Disp: , Rfl:   •  diclofenac (VOLTAREN) 50 MG EC tablet, Take 1 tablet by mouth 2 (Two) Times a Day., Disp: 60 tablet, Rfl: 5  •  docusate sodium (COLACE) 250 MG capsule, TAKE 1 CAPSULE BY MOUTH TWICE A DAY AS NEEDED FOR CONSTIPATION, Disp: , Rfl:   •  DULoxetine (CYMBALTA) 60 MG capsule, Take 1 capsule by mouth Daily., Disp: 90 capsule, Rfl: 0  •  gabapentin, once-daily, (Gralise) 600 MG tablet tablet, Take 1 tablet by mouth Daily., Disp: 30 tablet, Rfl: 5  •  Gel Base gel, Lidocaine 10% Clonidine 0.1% Baclofen 5% Cyclobenzaprine 4% Amitriptyline 2% Gabapentin 10% Prilocaine 2% gel. Apply 3-4 times daily prn, Disp: 240 g, Rfl: prn  •  LORazepam (ATIVAN) 1 MG tablet, Take 1 tablet by mouth Every 8 (Eight) Hours As Needed for Anxiety., Disp: , Rfl:    •  nortriptyline (PAMELOR) 10 MG capsule, Take 1 capsule by mouth Every Night. May take 1-2 tablets at night, Disp: 60 capsule, Rfl: 5  •  omeprazole (priLOSEC) 20 MG capsule, Take 20 mg by mouth Daily., Disp: , Rfl:   •  ondansetron (ZOFRAN) 4 MG tablet, Take 4 mg by mouth Every 8 (Eight) Hours As Needed for Nausea or Vomiting., Disp: , Rfl:   •  traZODone (DESYREL) 100 MG tablet, Take 100 mg by mouth every night at bedtime., Disp: , Rfl:   •  ubrogepant 100 MG tablet, TAKE 1 TABLET BY MOUTH AT ONSET OF HEADACHE, Disp: , Rfl:   •  Zipsor 25 MG capsule, TAKE 1 CAPSULE BY MOUTH FOUR TIMES A DAY IF NEEDED, Disp: , Rfl:   •  zolpidem (AMBIEN) 10 MG tablet, TAKE 1 TABLET ONCE A DAY AT BEDTIME, Disp: , Rfl: 0  Past Medical History:  Past Medical History:   Diagnosis Date   • Abnormal MRI, shoulder     Right Shoulder MRI 2010 revealed small glenohumeral joint effusion, capsulitis, tendinitis. No rotator cuff for labral tear was noted.    • Abnormal x-ray     X-Ray of the right wrist on 2014, revealed linear sclerosis within the distal radial metaphysis consistent with healed fracture. There is irregularity of the ulnar styloid consistent with prior fracture.     • Anxiety    • Depression    • History of constipation    • History of insomnia     Seconday to pain   • History of migraine headaches    • History of varicose veins    • Low back pain    • Pain in joint, shoulder region      Past Surgical History:  Past Surgical History:   Procedure Laterality Date   • BACK SURGERY      SCS implant   • CERVICAL LAMINECTOMY DECOMPRESSION POSTERIOR Bilateral 10/16/2018    Procedure: PARTIAL C2, C3, C4 LAMINECTOMY SPINAL CORD STIMULATOR REMOVAL INSERTION OF SPINAL CORD STIMULATOR;  Surgeon: Jam Pack MD;  Location: [facility];  Service: Neurosurgery   •  SECTION      x 3   • COLONOSCOPY     • OTHER SURGICAL HISTORY      Left Distal Radiolnar Joint Stabilization and Capsulodesis   • ROTATOR CUFF  REPAIR      Rotator cuff repair followed by three surgeries and manipulation under anesthesia with Dr. Ludwig. Fourth surgery performed by Dr. Graves.  Removal of suture and subacromial decompression with lysis of adhesions 1/2011.   • SPINAL CORD STIMULATOR IMPLANT N/A 10/16/2018    Procedure: REMOVAL AND REPLACEMENT SPINAL CORD STIMULATOR;  Surgeon: Jam Pack MD;  Location: [facility];  Service: Neurosurgery   • TUBAL ABDOMINAL LIGATION     • WRIST SURGERY       Social Hx:  Social History     Tobacco Use   • Smoking status: Never Smoker   • Smokeless tobacco: Never Used   Vaping Use   • Vaping Use: Never used   Substance Use Topics   • Alcohol use: No   • Drug use: No     Family Hx:  Family History   Problem Relation Age of Onset   • Diabetes Mother    • Hypertension Mother    • Stroke Mother    • Lymphoma Father    • Lung cancer Father    • Brain cancer Father    • Liver cancer Father    • Heart disease Maternal Grandmother    • Breast cancer Neg Hx    • Ovarian cancer Neg Hx      Review of Systems:        Review of Systems   Constitutional: Positive for fatigue. Negative for activity change, appetite change, chills, diaphoresis, fever and unexpected weight change.   HENT: Negative for congestion, dental problem, drooling, ear discharge, ear pain, facial swelling, hearing loss, mouth sores, nosebleeds, postnasal drip, rhinorrhea, sinus pressure, sinus pain, sneezing, sore throat, tinnitus, trouble swallowing and voice change.    Eyes: Negative for photophobia, pain, discharge, redness, itching and visual disturbance.   Respiratory: Negative for apnea, cough, choking, chest tightness, shortness of breath, wheezing and stridor.    Cardiovascular: Negative for chest pain, palpitations and leg swelling.   Gastrointestinal: Negative for abdominal distention, abdominal pain, anal bleeding, blood in stool, constipation, diarrhea, nausea, rectal pain and vomiting.   Endocrine: Negative for cold intolerance, heat  intolerance, polydipsia, polyphagia and polyuria.   Genitourinary: Negative for decreased urine volume, difficulty urinating, dyspareunia, dysuria, enuresis, flank pain, frequency, genital sores, hematuria, menstrual problem, pelvic pain, urgency, vaginal bleeding, vaginal discharge and vaginal pain.   Musculoskeletal: Negative for arthralgias, back pain, gait problem, joint swelling, myalgias, neck pain and neck stiffness.   Skin: Negative for color change, pallor, rash and wound.   Allergic/Immunologic: Negative for environmental allergies, food allergies and immunocompromised state.   Neurological: Positive for weakness. Negative for dizziness, tremors, seizures, syncope, facial asymmetry, speech difficulty, light-headedness, numbness and headaches.   Hematological: Negative for adenopathy. Does not bruise/bleed easily.   Psychiatric/Behavioral: Negative for agitation, behavioral problems, confusion, decreased concentration, dysphoric mood, hallucinations, self-injury, sleep disturbance and suicidal ideas. The patient is not nervous/anxious and is not hyperactive.         I have reviewed this note template and all pertinent parts of the review of systems social, family history, surgical history and medication list negative for fevers chills    Physical Examination:  Vitals:    03/22/21 0902   Temp: 97.2 °F (36.2 °C)      General Appearance:   Well developed, well nourished, well groomed, alert, and cooperative.  She is slight and her build  Neurological examination:  Neurologic Exam  Vital signs were reviewed and documented in the chart  Patient appeared in good neurologic function with normal comprehension fluent speech  Mood and affect are normal  Sense of smell deferred    Pupils symmetric equally reactive funduscopic exam not visualized   Visual fields intact to confrontation  Extraocular movements intact  Face motor function is symmetric she has some cheilitis of her right mouth  Facial sensations  normal  Hearing intact to finger rub hearing intact to finger rub  Tongue is midline  Palate symmetric  Swallowing normal  Shoulder shrug normal    Muscle bulk and tone slight  5 out of 5 strength no motor drift-effort dependent strength throughout  Gait normal intact  Negative Romberg  No clonus long tract signs or myelopathy  Specifically she has no hyperreflexia clonus long tract findings    Neck incisions healed        Review of Imaging/DATA:  CT scan reviewed x-rays reviewed shows migrated lead  Diagnoses/Plan:    Ms. Araya is a 46 y.o. female   I talked to her about the issue at stake with cervical mobility and cervical spinal cord stimulators she has had cephalad migration of her lead by quite a bit and this needs to be replaced to give her adequate coverage.  I explained the risk benefits expected outcome worsen neurologic exam I will try to get an MRI just to see if it helps with the cervical stenosis evaluation down below CT scan looks stable compared to prior imaging but there is some beam hardening artifact.  I explained that I may have to remove the entire system replace the battery replaced the lead etc. but I suspect hopefully a simple replacement with some tacking of the lead down more proximally would assist but explained there is a likelihood that it may not be as effective as before can make no guarantees    I discussed the case personally with her pain management physician and he agrees

## 2021-03-23 ENCOUNTER — TELEPHONE (OUTPATIENT)
Dept: NEUROSURGERY | Facility: CLINIC | Age: 46
End: 2021-03-23

## 2021-03-23 NOTE — TELEPHONE ENCOUNTER
Caller: Nettie Araya    Relationship to patient: Self    Best call back number: [phone]    Type of visit: SURGERY REQUEST APPT    Additional notes: PT WAS CALLING TO GET HOLD OF MANOLO TO SCHEDULE HER SURGERY. PLEASE CALL PATIENT BACK [phone]

## 2021-03-28 ENCOUNTER — HOSPITAL ENCOUNTER (OUTPATIENT)
Dept: MRI IMAGING | Facility: HOSPITAL | Age: 46
End: 2021-03-28

## 2021-03-29 ENCOUNTER — HOSPITAL ENCOUNTER (OUTPATIENT)
Dept: HOSPITAL 22 - RAD | Age: 46
End: 2021-03-29
Payer: MEDICARE

## 2021-03-29 ENCOUNTER — TELEPHONE (OUTPATIENT)
Dept: NEUROSURGERY | Facility: CLINIC | Age: 46
End: 2021-03-29

## 2021-03-29 DIAGNOSIS — E27.8: ICD-10-CM

## 2021-03-29 DIAGNOSIS — E04.1: Primary | ICD-10-CM

## 2021-03-29 PROCEDURE — 74170 CT ABD WO CNTRST FLWD CNTRST: CPT

## 2021-03-29 PROCEDURE — 76536 US EXAM OF HEAD AND NECK: CPT

## 2021-04-01 ENCOUNTER — TELEPHONE (OUTPATIENT)
Dept: NEUROSURGERY | Facility: CLINIC | Age: 46
End: 2021-04-01

## 2021-04-06 ENCOUNTER — TELEPHONE (OUTPATIENT)
Dept: NEUROSURGERY | Facility: CLINIC | Age: 46
End: 2021-04-06

## 2021-04-06 NOTE — TELEPHONE ENCOUNTER
Caller: Nettie Araya    Relationship to patient: Self    Best call back number: [phone]    Chief complaint: APPT. FOR SURGERY    Type of visit: SURGERY    Requested date: ASAP     If rescheduling, when is the original appointment: NA    Additional notes:PT CALLED AND ASKED IF POSSIBLE TO GO AHEAD AND SCHEDULE HER SURGERY. HER MRI IS SCHEDULED FOR 04/08/21. PT WANTED TO KNOW IF AT ALL POSSIBLE TO GET THIS SCHEDULED. PLEASE ADVISE THANK YOU!!

## 2021-04-08 ENCOUNTER — TELEPHONE (OUTPATIENT)
Dept: NEUROSURGERY | Facility: CLINIC | Age: 46
End: 2021-04-08

## 2021-04-08 ENCOUNTER — HOSPITAL ENCOUNTER (OUTPATIENT)
Dept: MRI IMAGING | Facility: HOSPITAL | Age: 46
Discharge: HOME | End: 2021-04-08
Admitting: NEUROLOGICAL SURGERY

## 2021-04-08 DIAGNOSIS — T85.192A MALFUNCTION OF SPINAL CORD STIMULATOR, INITIAL ENCOUNTER (HCC): ICD-10-CM

## 2021-04-08 DIAGNOSIS — G89.4 CHRONIC PAIN SYNDROME: ICD-10-CM

## 2021-04-08 PROCEDURE — 72141 MRI NECK SPINE W/O DYE: CPT

## 2021-04-08 NOTE — TELEPHONE ENCOUNTER
Caller: Nettie Araya    Relationship to patient: Self    Best call back number: [phone]    Chief complaint: SCHED SURGERY    Type of visit: SURGERY    Requested date: ASAP    Additional notes:PT CALLED AND STATED THAT SHE COMPLETED HER MRI TODAY AND WOULD LIKE TO HAVE SOMEONE CALL HER TO SCHED HER SURGERY DATE.     PLEASE REACH OUT TO PT AND ADVISE    THANK YOU

## 2021-04-08 NOTE — TELEPHONE ENCOUNTER
Spoke with the patient. I told her the surgery has to be approved by w/c before scheduling it. I'll keep her updated on the approval status.

## 2021-04-12 ENCOUNTER — TELEPHONE (OUTPATIENT)
Dept: NEUROSURGERY | Facility: CLINIC | Age: 46
End: 2021-04-12

## 2021-04-12 NOTE — TELEPHONE ENCOUNTER
Ramone is out of the office today.     He will be in surgery tomorrow. Routing to Arthur to have him review MRI

## 2021-04-12 NOTE — TELEPHONE ENCOUNTER
PT CALLED AND ASKED FOR  MEDICAL ASST. TO GIVE HER A CALL BACK. PT CALLED TO CHECK AND SEE IF  HAD READ HER MRI. PLEASE ADVISE THANK YOU!

## 2021-04-19 PROBLEM — M54.2 CERVICALGIA: Status: ACTIVE | Noted: 2021-04-19

## 2021-04-26 ENCOUNTER — APPOINTMENT (OUTPATIENT)
Dept: PREADMISSION TESTING | Facility: HOSPITAL | Age: 46
End: 2021-04-26

## 2021-04-26 ENCOUNTER — PRE-ADMISSION TESTING (OUTPATIENT)
Dept: PREADMISSION TESTING | Facility: HOSPITAL | Age: 46
End: 2021-04-26

## 2021-04-26 VITALS — BODY MASS INDEX: 21.05 KG/M2 | HEIGHT: 66 IN | WEIGHT: 131 LBS

## 2021-04-26 LAB
DEPRECATED RDW RBC AUTO: 41.4 FL (ref 37–54)
ERYTHROCYTE [DISTWIDTH] IN BLOOD BY AUTOMATED COUNT: 11.9 % (ref 12.3–15.4)
HCT VFR BLD AUTO: 44.5 % (ref 34–46.6)
HGB BLD-MCNC: 14.4 G/DL (ref 12–15.9)
MCH RBC QN AUTO: 30.7 PG (ref 26.6–33)
MCHC RBC AUTO-ENTMCNC: 32.4 G/DL (ref 31.5–35.7)
MCV RBC AUTO: 94.9 FL (ref 79–97)
MRSA DNA SPEC QL NAA+PROBE: NEGATIVE
PLATELET # BLD AUTO: 259 10*3/MM3 (ref 140–450)
PMV BLD AUTO: 10 FL (ref 6–12)
POTASSIUM SERPL-SCNC: 3.9 MMOL/L (ref 3.5–5.2)
RBC # BLD AUTO: 4.69 10*6/MM3 (ref 3.77–5.28)
SARS-COV-2 RNA PNL SPEC NAA+PROBE: NOT DETECTED
WBC # BLD AUTO: 5.69 10*3/MM3 (ref 3.4–10.8)

## 2021-04-26 PROCEDURE — 85027 COMPLETE CBC AUTOMATED: CPT

## 2021-04-26 PROCEDURE — 84132 ASSAY OF SERUM POTASSIUM: CPT

## 2021-04-26 PROCEDURE — 87641 MR-STAPH DNA AMP PROBE: CPT

## 2021-04-26 PROCEDURE — U0005 INFEC AGEN DETEC AMPLI PROBE: HCPCS

## 2021-04-26 PROCEDURE — 36415 COLL VENOUS BLD VENIPUNCTURE: CPT

## 2021-04-26 PROCEDURE — C9803 HOPD COVID-19 SPEC COLLECT: HCPCS

## 2021-04-26 PROCEDURE — U0004 COV-19 TEST NON-CDC HGH THRU: HCPCS

## 2021-04-28 ENCOUNTER — ANESTHESIA (OUTPATIENT)
Dept: PERIOP | Facility: HOSPITAL | Age: 46
End: 2021-04-28

## 2021-04-28 ENCOUNTER — HOSPITAL ENCOUNTER (OUTPATIENT)
Facility: HOSPITAL | Age: 46
Discharge: HOME | End: 2021-04-28
Attending: NEUROLOGICAL SURGERY | Admitting: NEUROLOGICAL SURGERY

## 2021-04-28 ENCOUNTER — ANESTHESIA EVENT (OUTPATIENT)
Dept: PERIOP | Facility: HOSPITAL | Age: 46
End: 2021-04-28

## 2021-04-28 ENCOUNTER — APPOINTMENT (OUTPATIENT)
Dept: GENERAL RADIOLOGY | Facility: HOSPITAL | Age: 46
End: 2021-04-28

## 2021-04-28 VITALS
DIASTOLIC BLOOD PRESSURE: 61 MMHG | WEIGHT: 131 LBS | RESPIRATION RATE: 12 BRPM | OXYGEN SATURATION: 97 % | HEIGHT: 66 IN | BODY MASS INDEX: 21.05 KG/M2 | SYSTOLIC BLOOD PRESSURE: 100 MMHG | HEART RATE: 83 BPM | TEMPERATURE: 97.6 F

## 2021-04-28 DIAGNOSIS — T85.192D MALFUNCTION OF SPINAL CORD STIMULATOR, SUBSEQUENT ENCOUNTER: Primary | ICD-10-CM

## 2021-04-28 DIAGNOSIS — M54.2 CERVICALGIA: ICD-10-CM

## 2021-04-28 LAB
B-HCG UR QL: NEGATIVE
INTERNAL NEGATIVE CONTROL: NEGATIVE
INTERNAL POSITIVE CONTROL: POSITIVE
Lab: NORMAL

## 2021-04-28 PROCEDURE — 63664 REVISE SPINE ELTRD PLATE: CPT | Performed by: NEUROLOGICAL SURGERY

## 2021-04-28 PROCEDURE — A9270 NON-COVERED ITEM OR SERVICE: HCPCS | Performed by: NEUROLOGICAL SURGERY

## 2021-04-28 PROCEDURE — 25010000003 CEFAZOLIN IN DEXTROSE 2-4 GM/100ML-% SOLUTION: Performed by: NEUROLOGICAL SURGERY

## 2021-04-28 PROCEDURE — 0 LIDOCAINE 1 % SOLUTION: Performed by: NURSE ANESTHETIST, CERTIFIED REGISTERED

## 2021-04-28 PROCEDURE — 0 CEFAZOLIN IN DEXTROSE 2-4 GM/100ML-% SOLUTION: Performed by: NEUROLOGICAL SURGERY

## 2021-04-28 PROCEDURE — 63710000001 FAMOTIDINE 20 MG TABLET: Performed by: ANESTHESIOLOGY

## 2021-04-28 PROCEDURE — 25010000003 LIDOCAINE 1 % SOLUTION: Performed by: NURSE ANESTHETIST, CERTIFIED REGISTERED

## 2021-04-28 PROCEDURE — 63710000001 HYDROCODONE-ACETAMINOPHEN 7.5-325 MG TABLET: Performed by: NEUROLOGICAL SURGERY

## 2021-04-28 PROCEDURE — 63664 REVISE SPINE ELTRD PLATE: CPT | Performed by: PHYSICIAN ASSISTANT

## 2021-04-28 PROCEDURE — 63710000001 MINERAL OIL OIL 10 ML VIAL: Performed by: NEUROLOGICAL SURGERY

## 2021-04-28 PROCEDURE — 63710000001 ACETAMINOPHEN 325 MG TABLET: Performed by: NEUROLOGICAL SURGERY

## 2021-04-28 PROCEDURE — 25010000002 PROPOFOL 10 MG/ML EMULSION: Performed by: NURSE ANESTHETIST, CERTIFIED REGISTERED

## 2021-04-28 PROCEDURE — 25010000002 DEXAMETHASONE PER 1 MG: Performed by: NURSE ANESTHETIST, CERTIFIED REGISTERED

## 2021-04-28 PROCEDURE — C1889 IMPLANT/INSERT DEVICE, NOC: HCPCS | Performed by: NEUROLOGICAL SURGERY

## 2021-04-28 PROCEDURE — 25010000002 NEOSTIGMINE 10 MG/10ML SOLUTION: Performed by: NURSE ANESTHETIST, CERTIFIED REGISTERED

## 2021-04-28 PROCEDURE — 76000 FLUOROSCOPY <1 HR PHYS/QHP: CPT

## 2021-04-28 PROCEDURE — A9270 NON-COVERED ITEM OR SERVICE: HCPCS | Performed by: ANESTHESIOLOGY

## 2021-04-28 PROCEDURE — G0378 HOSPITAL OBSERVATION PER HR: HCPCS

## 2021-04-28 PROCEDURE — 81025 URINE PREGNANCY TEST: CPT | Performed by: NEUROLOGICAL SURGERY

## 2021-04-28 PROCEDURE — 25010000002 HYDROMORPHONE PER 4 MG: Performed by: NURSE ANESTHETIST, CERTIFIED REGISTERED

## 2021-04-28 PROCEDURE — 63710000001 IBUPROFEN 800 MG TABLET: Performed by: NEUROLOGICAL SURGERY

## 2021-04-28 PROCEDURE — 25010000002 ONDANSETRON PER 1 MG: Performed by: NURSE ANESTHETIST, CERTIFIED REGISTERED

## 2021-04-28 PROCEDURE — 25010000002 PHENYLEPHRINE 10 MG/ML SOLUTION 1 ML VIAL: Performed by: NURSE ANESTHETIST, CERTIFIED REGISTERED

## 2021-04-28 DEVICE — HEMOST ABS SURGIFOAM SZ100 8X12 10MM: Type: IMPLANTABLE DEVICE | Site: SPINE CERVICAL | Status: FUNCTIONAL

## 2021-04-28 RX ORDER — NEOSTIGMINE METHYLSULFATE 1 MG/ML
INJECTION, SOLUTION INTRAVENOUS AS NEEDED
Status: DISCONTINUED | OUTPATIENT
Start: 2021-04-28 | End: 2021-04-28 | Stop reason: SURG

## 2021-04-28 RX ORDER — NAPROXEN 250 MG/1
250 TABLET ORAL 2 TIMES DAILY WITH MEALS
Status: DISCONTINUED | OUTPATIENT
Start: 2021-04-28 | End: 2021-04-28 | Stop reason: HOSPADM

## 2021-04-28 RX ORDER — FENTANYL CITRATE 50 UG/ML
50 INJECTION, SOLUTION INTRAMUSCULAR; INTRAVENOUS
Status: DISCONTINUED | OUTPATIENT
Start: 2021-04-28 | End: 2021-04-28 | Stop reason: HOSPADM

## 2021-04-28 RX ORDER — IBUPROFEN 800 MG/1
800 TABLET ORAL ONCE
Status: COMPLETED | OUTPATIENT
Start: 2021-04-28 | End: 2021-04-28

## 2021-04-28 RX ORDER — MIDAZOLAM HYDROCHLORIDE 1 MG/ML
2 INJECTION INTRAMUSCULAR; INTRAVENOUS
Status: DISCONTINUED | OUTPATIENT
Start: 2021-04-28 | End: 2021-04-28 | Stop reason: HOSPADM

## 2021-04-28 RX ORDER — CEFAZOLIN SODIUM 2 G/100ML
2 INJECTION, SOLUTION INTRAVENOUS ONCE
Status: COMPLETED | OUTPATIENT
Start: 2021-04-28 | End: 2021-04-28

## 2021-04-28 RX ORDER — HYDROMORPHONE HYDROCHLORIDE 1 MG/ML
0.5 INJECTION, SOLUTION INTRAMUSCULAR; INTRAVENOUS; SUBCUTANEOUS
Status: COMPLETED | OUTPATIENT
Start: 2021-04-28 | End: 2021-04-28

## 2021-04-28 RX ORDER — CHLORTHALIDONE 25 MG/1
50 TABLET ORAL DAILY
Status: DISCONTINUED | OUTPATIENT
Start: 2021-04-28 | End: 2021-04-28 | Stop reason: HOSPADM

## 2021-04-28 RX ORDER — LIDOCAINE HYDROCHLORIDE 10 MG/ML
INJECTION, SOLUTION INFILTRATION; PERINEURAL AS NEEDED
Status: DISCONTINUED | OUTPATIENT
Start: 2021-04-28 | End: 2021-04-28 | Stop reason: SURG

## 2021-04-28 RX ORDER — GABAPENTIN 300 MG/1
300 CAPSULE ORAL EVERY 8 HOURS SCHEDULED
Status: DISCONTINUED | OUTPATIENT
Start: 2021-04-28 | End: 2021-04-28 | Stop reason: HOSPADM

## 2021-04-28 RX ORDER — SODIUM CHLORIDE 0.9 % (FLUSH) 0.9 %
10 SYRINGE (ML) INJECTION EVERY 12 HOURS SCHEDULED
Status: DISCONTINUED | OUTPATIENT
Start: 2021-04-28 | End: 2021-04-28 | Stop reason: HOSPADM

## 2021-04-28 RX ORDER — TRAZODONE HYDROCHLORIDE 100 MG/1
100 TABLET ORAL NIGHTLY PRN
Status: DISCONTINUED | OUTPATIENT
Start: 2021-04-28 | End: 2021-04-28 | Stop reason: HOSPADM

## 2021-04-28 RX ORDER — CEFAZOLIN SODIUM 2 G/100ML
2 INJECTION, SOLUTION INTRAVENOUS EVERY 8 HOURS
Status: CANCELLED | OUTPATIENT
Start: 2021-04-28 | End: 2021-04-29

## 2021-04-28 RX ORDER — MINERAL OIL
OIL (ML) MISCELLANEOUS AS NEEDED
Status: DISCONTINUED | OUTPATIENT
Start: 2021-04-28 | End: 2021-04-28 | Stop reason: HOSPADM

## 2021-04-28 RX ORDER — ROCURONIUM BROMIDE 10 MG/ML
INJECTION, SOLUTION INTRAVENOUS AS NEEDED
Status: DISCONTINUED | OUTPATIENT
Start: 2021-04-28 | End: 2021-04-28 | Stop reason: SURG

## 2021-04-28 RX ORDER — DEXAMETHASONE SODIUM PHOSPHATE 4 MG/ML
INJECTION, SOLUTION INTRA-ARTICULAR; INTRALESIONAL; INTRAMUSCULAR; INTRAVENOUS; SOFT TISSUE AS NEEDED
Status: DISCONTINUED | OUTPATIENT
Start: 2021-04-28 | End: 2021-04-28 | Stop reason: SURG

## 2021-04-28 RX ORDER — BUPIVACAINE HCL/0.9 % NACL/PF 0.125 %
PLASTIC BAG, INJECTION (ML) EPIDURAL AS NEEDED
Status: DISCONTINUED | OUTPATIENT
Start: 2021-04-28 | End: 2021-04-28 | Stop reason: SURG

## 2021-04-28 RX ORDER — MAGNESIUM HYDROXIDE 1200 MG/15ML
LIQUID ORAL AS NEEDED
Status: DISCONTINUED | OUTPATIENT
Start: 2021-04-28 | End: 2021-04-28 | Stop reason: HOSPADM

## 2021-04-28 RX ORDER — OXYCODONE HYDROCHLORIDE AND ACETAMINOPHEN 5; 325 MG/1; MG/1
1 TABLET ORAL EVERY 4 HOURS PRN
Status: DISCONTINUED | OUTPATIENT
Start: 2021-04-28 | End: 2021-04-28 | Stop reason: HOSPADM

## 2021-04-28 RX ORDER — ONDANSETRON 4 MG/1
4 TABLET, FILM COATED ORAL EVERY 8 HOURS PRN
Status: DISCONTINUED | OUTPATIENT
Start: 2021-04-28 | End: 2021-04-28 | Stop reason: HOSPADM

## 2021-04-28 RX ORDER — LIDOCAINE HYDROCHLORIDE AND EPINEPHRINE 5; 5 MG/ML; UG/ML
INJECTION, SOLUTION INFILTRATION; PERINEURAL AS NEEDED
Status: DISCONTINUED | OUTPATIENT
Start: 2021-04-28 | End: 2021-04-28 | Stop reason: HOSPADM

## 2021-04-28 RX ORDER — PROPOFOL 10 MG/ML
VIAL (ML) INTRAVENOUS AS NEEDED
Status: DISCONTINUED | OUTPATIENT
Start: 2021-04-28 | End: 2021-04-28 | Stop reason: SURG

## 2021-04-28 RX ORDER — LORAZEPAM 1 MG/1
1 TABLET ORAL EVERY 8 HOURS PRN
Status: DISCONTINUED | OUTPATIENT
Start: 2021-04-28 | End: 2021-04-28 | Stop reason: HOSPADM

## 2021-04-28 RX ORDER — TEMAZEPAM 15 MG/1
15 CAPSULE ORAL NIGHTLY PRN
Status: DISCONTINUED | OUTPATIENT
Start: 2021-04-28 | End: 2021-04-28 | Stop reason: HOSPADM

## 2021-04-28 RX ORDER — GINSENG 100 MG
CAPSULE ORAL AS NEEDED
Status: DISCONTINUED | OUTPATIENT
Start: 2021-04-28 | End: 2021-04-28 | Stop reason: HOSPADM

## 2021-04-28 RX ORDER — SODIUM CHLORIDE 0.9 % (FLUSH) 0.9 %
10 SYRINGE (ML) INJECTION AS NEEDED
Status: DISCONTINUED | OUTPATIENT
Start: 2021-04-28 | End: 2021-04-28 | Stop reason: HOSPADM

## 2021-04-28 RX ORDER — ACETAMINOPHEN 325 MG/1
650 TABLET ORAL EVERY 4 HOURS PRN
Status: DISCONTINUED | OUTPATIENT
Start: 2021-04-28 | End: 2021-04-28 | Stop reason: HOSPADM

## 2021-04-28 RX ORDER — SODIUM CHLORIDE, SODIUM LACTATE, POTASSIUM CHLORIDE, CALCIUM CHLORIDE 600; 310; 30; 20 MG/100ML; MG/100ML; MG/100ML; MG/100ML
9 INJECTION, SOLUTION INTRAVENOUS CONTINUOUS
Status: DISCONTINUED | OUTPATIENT
Start: 2021-04-28 | End: 2021-04-28 | Stop reason: HOSPADM

## 2021-04-28 RX ORDER — MIDAZOLAM HYDROCHLORIDE 1 MG/ML
1 INJECTION INTRAMUSCULAR; INTRAVENOUS
Status: DISCONTINUED | OUTPATIENT
Start: 2021-04-28 | End: 2021-04-28 | Stop reason: HOSPADM

## 2021-04-28 RX ORDER — LIDOCAINE HYDROCHLORIDE 10 MG/ML
0.5 INJECTION, SOLUTION EPIDURAL; INFILTRATION; INTRACAUDAL; PERINEURAL ONCE AS NEEDED
Status: COMPLETED | OUTPATIENT
Start: 2021-04-28 | End: 2021-04-28

## 2021-04-28 RX ORDER — DULOXETIN HYDROCHLORIDE 60 MG/1
60 CAPSULE, DELAYED RELEASE ORAL DAILY
Status: DISCONTINUED | OUTPATIENT
Start: 2021-04-28 | End: 2021-04-28 | Stop reason: HOSPADM

## 2021-04-28 RX ORDER — ACETAMINOPHEN 325 MG/1
650 TABLET ORAL ONCE
Status: COMPLETED | OUTPATIENT
Start: 2021-04-28 | End: 2021-04-28

## 2021-04-28 RX ORDER — NALOXONE HCL 0.4 MG/ML
0.4 VIAL (ML) INJECTION
Status: DISCONTINUED | OUTPATIENT
Start: 2021-04-28 | End: 2021-04-28 | Stop reason: HOSPADM

## 2021-04-28 RX ORDER — FAMOTIDINE 10 MG/ML
20 INJECTION, SOLUTION INTRAVENOUS ONCE
Status: DISCONTINUED | OUTPATIENT
Start: 2021-04-28 | End: 2021-04-28

## 2021-04-28 RX ORDER — FAMOTIDINE 20 MG/1
20 TABLET, FILM COATED ORAL ONCE
Status: COMPLETED | OUTPATIENT
Start: 2021-04-28 | End: 2021-04-28

## 2021-04-28 RX ORDER — GLYCOPYRROLATE 0.2 MG/ML
INJECTION INTRAMUSCULAR; INTRAVENOUS AS NEEDED
Status: DISCONTINUED | OUTPATIENT
Start: 2021-04-28 | End: 2021-04-28 | Stop reason: SURG

## 2021-04-28 RX ORDER — EPHEDRINE SULFATE 50 MG/ML
INJECTION, SOLUTION INTRAVENOUS AS NEEDED
Status: DISCONTINUED | OUTPATIENT
Start: 2021-04-28 | End: 2021-04-28 | Stop reason: SURG

## 2021-04-28 RX ORDER — DIAZEPAM 5 MG/1
5 TABLET ORAL EVERY 6 HOURS PRN
Status: DISCONTINUED | OUTPATIENT
Start: 2021-04-28 | End: 2021-04-28 | Stop reason: HOSPADM

## 2021-04-28 RX ORDER — HYDROCODONE BITARTRATE AND ACETAMINOPHEN 7.5; 325 MG/1; MG/1
1 TABLET ORAL ONCE
Status: COMPLETED | OUTPATIENT
Start: 2021-04-28 | End: 2021-04-28

## 2021-04-28 RX ORDER — BUPROPION HYDROCHLORIDE 150 MG/1
150 TABLET, EXTENDED RELEASE ORAL EVERY 12 HOURS SCHEDULED
Status: DISCONTINUED | OUTPATIENT
Start: 2021-04-28 | End: 2021-04-28 | Stop reason: HOSPADM

## 2021-04-28 RX ORDER — ONDANSETRON 2 MG/ML
4 INJECTION INTRAMUSCULAR; INTRAVENOUS ONCE AS NEEDED
Status: DISCONTINUED | OUTPATIENT
Start: 2021-04-28 | End: 2021-04-28 | Stop reason: HOSPADM

## 2021-04-28 RX ORDER — ONDANSETRON 2 MG/ML
INJECTION INTRAMUSCULAR; INTRAVENOUS AS NEEDED
Status: DISCONTINUED | OUTPATIENT
Start: 2021-04-28 | End: 2021-04-28 | Stop reason: SURG

## 2021-04-28 RX ORDER — BACLOFEN 10 MG/1
10 TABLET ORAL 4 TIMES DAILY
Status: DISCONTINUED | OUTPATIENT
Start: 2021-04-28 | End: 2021-04-28 | Stop reason: HOSPADM

## 2021-04-28 RX ORDER — OXYCODONE HYDROCHLORIDE AND ACETAMINOPHEN 5; 325 MG/1; MG/1
1 TABLET ORAL EVERY 4 HOURS PRN
Qty: 25 TABLET | Refills: 0 | Status: SHIPPED | OUTPATIENT
Start: 2021-04-28 | End: 2021-05-10

## 2021-04-28 RX ADMIN — ROCURONIUM BROMIDE 20 MG: 10 INJECTION INTRAVENOUS at 08:21

## 2021-04-28 RX ADMIN — IBUPROFEN 800 MG: 800 TABLET ORAL at 06:57

## 2021-04-28 RX ADMIN — HYDROCODONE BITARTRATE AND ACETAMINOPHEN 1 TABLET: 7.5; 325 TABLET ORAL at 13:23

## 2021-04-28 RX ADMIN — ONDANSETRON 4 MG: 2 INJECTION INTRAMUSCULAR; INTRAVENOUS at 07:57

## 2021-04-28 RX ADMIN — HYDROCODONE BITARTRATE AND ACETAMINOPHEN 1 TABLET: 7.5; 325 TABLET ORAL at 06:57

## 2021-04-28 RX ADMIN — CEFAZOLIN SODIUM 2 G: 2 INJECTION, SOLUTION INTRAVENOUS at 12:44

## 2021-04-28 RX ADMIN — ROCURONIUM BROMIDE 50 MG: 10 INJECTION INTRAVENOUS at 07:35

## 2021-04-28 RX ADMIN — EPHEDRINE SULFATE 15 MG: 50 INJECTION, SOLUTION INTRAVENOUS at 07:48

## 2021-04-28 RX ADMIN — SODIUM CHLORIDE, POTASSIUM CHLORIDE, SODIUM LACTATE AND CALCIUM CHLORIDE 9 ML/HR: 600; 310; 30; 20 INJECTION, SOLUTION INTRAVENOUS at 06:30

## 2021-04-28 RX ADMIN — LIDOCAINE HYDROCHLORIDE 0.5 ML: 10 INJECTION, SOLUTION EPIDURAL; INFILTRATION; INTRACAUDAL; PERINEURAL at 06:30

## 2021-04-28 RX ADMIN — FAMOTIDINE 20 MG: 20 TABLET, FILM COATED ORAL at 06:57

## 2021-04-28 RX ADMIN — HYDROMORPHONE HYDROCHLORIDE 0.5 MG: 1 INJECTION, SOLUTION INTRAMUSCULAR; INTRAVENOUS; SUBCUTANEOUS at 12:26

## 2021-04-28 RX ADMIN — EPHEDRINE SULFATE 15 MG: 50 INJECTION, SOLUTION INTRAVENOUS at 07:41

## 2021-04-28 RX ADMIN — CEFAZOLIN SODIUM 2 G: 2 INJECTION, SOLUTION INTRAVENOUS at 07:32

## 2021-04-28 RX ADMIN — ACETAMINOPHEN 650 MG: 325 TABLET ORAL at 06:57

## 2021-04-28 RX ADMIN — Medication 100 MCG: at 07:59

## 2021-04-28 RX ADMIN — SODIUM CHLORIDE, POTASSIUM CHLORIDE, SODIUM LACTATE AND CALCIUM CHLORIDE: 600; 310; 30; 20 INJECTION, SOLUTION INTRAVENOUS at 07:27

## 2021-04-28 RX ADMIN — GLYCOPYRROLATE 0.4 MG: 0.4 INJECTION INTRAMUSCULAR; INTRAVENOUS at 09:15

## 2021-04-28 RX ADMIN — LIDOCAINE HYDROCHLORIDE 50 MG: 10 INJECTION, SOLUTION INFILTRATION; PERINEURAL at 07:35

## 2021-04-28 RX ADMIN — Medication 100 MCG: at 08:34

## 2021-04-28 RX ADMIN — Medication 100 MCG: at 09:40

## 2021-04-28 RX ADMIN — DEXAMETHASONE SODIUM PHOSPHATE 8 MG: 4 INJECTION, SOLUTION INTRA-ARTICULAR; INTRALESIONAL; INTRAMUSCULAR; INTRAVENOUS; SOFT TISSUE at 07:57

## 2021-04-28 RX ADMIN — PHENYLEPHRINE HYDROCHLORIDE 0.3 MCG/KG/MIN: 10 INJECTION INTRAVENOUS at 07:59

## 2021-04-28 RX ADMIN — EPHEDRINE SULFATE 20 MG: 50 INJECTION, SOLUTION INTRAVENOUS at 07:54

## 2021-04-28 RX ADMIN — HYDROMORPHONE HYDROCHLORIDE 0.5 MG: 1 INJECTION, SOLUTION INTRAMUSCULAR; INTRAVENOUS; SUBCUTANEOUS at 10:52

## 2021-04-28 RX ADMIN — PROPOFOL 150 MG: 10 INJECTION, EMULSION INTRAVENOUS at 07:35

## 2021-04-28 RX ADMIN — NEOSTIGMINE 5 MG: 1 INJECTION INTRAVENOUS at 09:15

## 2021-04-28 RX ADMIN — SODIUM CHLORIDE, POTASSIUM CHLORIDE, SODIUM LACTATE AND CALCIUM CHLORIDE: 600; 310; 30; 20 INJECTION, SOLUTION INTRAVENOUS at 08:50

## 2021-04-28 NOTE — ANESTHESIA POSTPROCEDURE EVALUATION
Patient: Nettie Araya    Procedure Summary     Date: 04/28/21 Room / Location:  [facility] [address] [place] [address] [facility]    Anesthesia Start: 0727 Anesthesia Stop: 0932    Procedure: SPINAL CORD STIMULATOR REVISION CERVICAL,  revision (N/A Back) Diagnosis:       Cervicalgia      (Cervicalgia [M54.2])    Surgeons: Jam Pack MD Provider: Rhys Ramirez MD    Anesthesia Type: general ASA Status: 2          Anesthesia Type: general    Vitals  Vitals Value Taken Time   BP     Temp     Pulse 63 04/28/21 0931   Resp     SpO2 100 % 04/28/21 0931   Vitals shown include unvalidated device data.        Post Anesthesia Care and Evaluation    Patient location during evaluation: PACU  Patient participation: complete - patient participated  Level of consciousness: awake and alert  Pain management: adequate  Airway patency: patent  Anesthetic complications: No anesthetic complications  PONV Status: none  Cardiovascular status: hemodynamically stable and acceptable  Respiratory status: nonlabored ventilation, acceptable, nasal cannula and oral airway  Hydration status: acceptable

## 2021-04-28 NOTE — ANESTHESIA PREPROCEDURE EVALUATION
Anesthesia Evaluation                  Airway   Mallampati: I  TM distance: >3 FB  Neck ROM: full  No difficulty expected  Dental      Pulmonary    Cardiovascular     ECG reviewed        Neuro/Psych  (+) psychiatric history Anxiety and Depression,     GI/Hepatic/Renal/Endo    (+) obesity,  GERD,      Musculoskeletal     Abdominal    Substance History      OB/GYN          Other                        Anesthesia Plan    ASA 2     general     intravenous induction     Anesthetic plan, all risks, benefits, and alternatives have been provided, discussed and informed consent has been obtained with: patient.    Plan discussed with CRNA.

## 2021-04-29 ENCOUNTER — TELEPHONE (OUTPATIENT)
Dept: NEUROSURGERY | Facility: CLINIC | Age: 46
End: 2021-04-29

## 2021-04-29 NOTE — TELEPHONE ENCOUNTER
PATIENT IS REQUESTING A NOTE ON BEHALF OF HER DAUGHTER WHO HAS MISSED WORK TO ACCOMPANY HER TO SURGERY YESTERDAY AND TO CARE FOR HER WHILE SHE RECOVERS.  DAUGHTER (CASEY NAVARRO) PLANS TO RETURN BACK TO WORK ON Monday MAY 3, 2021.  IF POSSIBLE, PLEASE CALL PATIENT AND INFORM HER THAT THE LETTER IS READY FOR PICKUP.    [phone]

## 2021-05-10 ENCOUNTER — OFFICE VISIT (OUTPATIENT)
Dept: NEUROSURGERY | Facility: CLINIC | Age: 46
End: 2021-05-10

## 2021-05-10 VITALS
WEIGHT: 131.4 LBS | DIASTOLIC BLOOD PRESSURE: 55 MMHG | HEART RATE: 75 BPM | TEMPERATURE: 97.3 F | OXYGEN SATURATION: 97 % | HEIGHT: 66 IN | SYSTOLIC BLOOD PRESSURE: 90 MMHG | BODY MASS INDEX: 21.12 KG/M2

## 2021-05-10 DIAGNOSIS — M54.2 CERVICALGIA: Primary | ICD-10-CM

## 2021-05-10 PROCEDURE — 99024 POSTOP FOLLOW-UP VISIT: CPT | Performed by: PHYSICIAN ASSISTANT

## 2021-05-10 RX ORDER — LORAZEPAM 0.5 MG/1
0.5 TABLET ORAL 3 TIMES DAILY PRN
COMMUNITY
Start: 2021-04-21

## 2021-05-10 RX ORDER — ZOLPIDEM TARTRATE 5 MG/1
TABLET ORAL
COMMUNITY
Start: 2021-04-21

## 2021-05-10 NOTE — PROGRESS NOTES
Subjective   Patient ID: Nettie Araya is a 46 y.o. female is here today for follow-up for suture removal.    HPI:      Patient is a very sweet 46-year-old female who is known to the neurosurgical practice for undergoing a spinal cord stimulator revision.  Patient initially had spinal stimulator placed couple years ago but had it advanced through the C1-2 laminar space.  Patient was having diminishing effects of the stimulator and revision was deemed necessary by Dr. Pack.  On 4/28/2021 patient was taken to the operating room and the posterior cervical laminectomy/spinal stimulator placement was revised and tied down in the appropriate position.    Patient states that the dysesthetic pains that she has been having have improved drastically and is only having posterior neck pain which is absolutely reasonable after this surgery.    The following portions of the patient's history were reviewed and updated as appropriate: allergies, current medications, past family history, past medical history, past social history, past surgical history and problem list.    Review of Systems   Constitutional: Positive for fatigue. Negative for activity change, appetite change, chills, diaphoresis, fever and unexpected weight change.   HENT: Negative for congestion, dental problem, drooling, ear discharge, ear pain, facial swelling, hearing loss, mouth sores, nosebleeds, postnasal drip, rhinorrhea, sinus pressure, sinus pain, sneezing, sore throat, tinnitus, trouble swallowing and voice change.    Eyes: Negative for photophobia, pain, discharge, redness, itching and visual disturbance.   Respiratory: Negative for apnea, cough, choking, chest tightness, shortness of breath, wheezing and stridor.    Cardiovascular: Negative for chest pain, palpitations and leg swelling.   Gastrointestinal: Negative for abdominal distention, abdominal pain, anal bleeding, blood in stool, constipation, diarrhea, nausea, rectal pain and vomiting.  "  Endocrine: Negative for cold intolerance, heat intolerance, polydipsia, polyphagia and polyuria.   Genitourinary: Negative for decreased urine volume, difficulty urinating, dyspareunia, dysuria, enuresis, flank pain, frequency, genital sores, hematuria, menstrual problem, pelvic pain, urgency, vaginal bleeding, vaginal discharge and vaginal pain.   Musculoskeletal: Negative for arthralgias, back pain, gait problem, joint swelling, myalgias, neck pain and neck stiffness.   Skin: Negative for color change, pallor, rash and wound.   Allergic/Immunologic: Negative for environmental allergies, food allergies and immunocompromised state.   Neurological: Positive for weakness. Negative for dizziness, tremors, seizures, syncope, facial asymmetry, speech difficulty, light-headedness, numbness and headaches.   Hematological: Negative for adenopathy. Does not bruise/bleed easily.   Psychiatric/Behavioral: Negative for agitation, behavioral problems, confusion, decreased concentration, dysphoric mood, hallucinations, self-injury, sleep disturbance and suicidal ideas. The patient is not nervous/anxious and is not hyperactive.          Objective    reports that she has never smoked. She has never used smokeless tobacco. She reports that she does not drink alcohol and does not use drugs.   SMOKING STATUS: Non-smoker    Physical Exam:   Vitals:BP 90/55 (BP Location: Left arm, Patient Position: Sitting, Cuff Size: Adult)   Pulse 75   Temp 97.3 °F (36.3 °C)   Ht 167.6 cm (65.98\")   Wt 59.6 kg (131 lb 6.4 oz)   SpO2 97%   BMI 21.22 kg/m²    BMI: Body mass index is 21.22 kg/m².     GENERAL:   The patient is in no acute distress, and is able to answer all questions appropriately.  Skin:  The incision is well-healed and well approximated.  No signs of infection, bleeding, or erythema.  Musculoskeletal:     strength is 5 out of 5 bilaterally.   Shoulder abduction is 5 out of 5.    Dorsiflexion is 5/5 " Bilaterally  Plantarflexion is 5/5 bilaterally  Hip Flexion 5/5 bilaterally.    The patient´s gait is normal without antalgia.  Neurologic:   The patient is alert and oriented by 3.     Pupils are equal and reactive to light.    Visual fields are full.    Extraocular movements are intact without nystagmus.    There is no evidence of central motor drift. No facial droop.  No difficulty with rapid alternating movements.    Sensation is equal bilaterally with no deficit.      Reflexes:  2+ @ biceps, triceps, brachioradialis, as well as the patellar and Achilles tendon bilaterally.  No sign of Downs's, clonus, or long track signs      Assessment/Plan   Independent Review of Radiographic Studies:    No new films reviewed this visit  Medical Decision Making:    At this point the patient is doing very well.  The patient is pleased with postsurgical outcome.  With the resolution of pain, I believe that it is adequate to see the patient back on an as-needed basis.  The patient has been educated on reasons that would necessitate a referral back to us in a more urgent manner.  The patient understands of his instructions and limitations for the best post-operative success.    We are very technical in this procedure if she starts to have issues with her incision she could give us a call back    It has been a pleasure providing neurosurgical care.    Arthur Cornejo PA-C      Patient's Body mass index is 21.22 kg/m². indicating that she is within normal range (BMI 18.5-24.9). No BMI management plan needed.    Diagnoses and all orders for this visit:    1. Cervicalgia (Primary)      Return if symptoms worsen or fail to improve.

## 2021-05-11 ENCOUNTER — TELEPHONE (OUTPATIENT)
Dept: NEUROSURGERY | Facility: CLINIC | Age: 46
End: 2021-05-11

## 2021-05-11 RX ORDER — TRAMADOL HYDROCHLORIDE 50 MG/1
50 TABLET ORAL EVERY 6 HOURS PRN
Qty: 60 TABLET | Refills: 0 | Status: SHIPPED | OUTPATIENT
Start: 2021-05-11

## 2021-05-11 NOTE — TELEPHONE ENCOUNTER
Provider:  Arthur URBINA  Caller: patient  Time of call:   12:55  Phone #:  [phone]  Surgery:  SCS revision  Surgery Date:  04/28/21  Last visit:   yesterday  Next visit:     KARY:         Reason for call:     Patient called and said Arthur URBINA was going to call her in Tramadol yesterday, but has not received this.

## 2021-06-01 DIAGNOSIS — G90.511 COMPLEX REGIONAL PAIN SYNDROME TYPE 1 OF RIGHT UPPER EXTREMITY: ICD-10-CM

## 2021-06-01 RX ORDER — GABAPENTIN 600 MG/1
TABLET, FILM COATED ORAL
Qty: 30 TABLET | Refills: 2 | Status: SHIPPED | OUTPATIENT
Start: 2021-06-01 | End: 2021-08-31

## 2021-06-01 RX ORDER — TRAMADOL HYDROCHLORIDE 50 MG/1
TABLET ORAL
Qty: 60 TABLET | OUTPATIENT
Start: 2021-06-01

## 2021-06-01 NOTE — TELEPHONE ENCOUNTER
Provider:  Ramone  Caller: automated refill   Time of call:   --  Phone #:  [phone]  Surgery:  SPINAL CORD STIMULATOR REVISION CERVICAL  Surgery Date:  4-28-21  Last visit:   5-10-21/Arthur  Next visit: --    KARY:         04/21/2021 Lorazepam 0.5MG 1975 90 30 ALLISON ROBLES [facility] 10-  [address]  [place] 2  04/21/2021 [facility]pidem Tartrate 5MG 1975 [facility]  [place] 2  04/28/2021 Oxycodone/Acetaminophen 325MG/5MG 1975 25 5 RAMONE SPARKS [facility]  [facility] 38 3  05/04/2021 Gralise 600MG 1975 30 30 AARON RAJAN [facility] [place] 1    05/12/2021 Tramadol Hcl  50MG/50MG/50MG/50MG/50MG/  1975 60 15 APRO [facility] [place] 20 1    05/21/2021 Zolpidem Tartrate 5MG 1975 30 30 ALLISON ROBLES [facility] 10-  0591  [facility] 2    Reason for call:         Requested Prescriptions     Pending Prescriptions Disp Refills   • traMADol (ULTRAM) 50 MG tablet [Pharmacy Med Name: TRAMADOL 50MG TABLETS] 60 tablet      Sig: TAKE 1 TABLET BY MOUTH EVERY 6 HOURS AS NEEDED FOR MODERATE PAIN

## 2021-07-13 DIAGNOSIS — G90.511 COMPLEX REGIONAL PAIN SYNDROME TYPE 1 OF RIGHT UPPER EXTREMITY: ICD-10-CM

## 2021-07-13 RX ORDER — DULOXETIN HYDROCHLORIDE 60 MG/1
CAPSULE, DELAYED RELEASE ORAL
Qty: 90 CAPSULE | Refills: 0 | Status: SHIPPED | OUTPATIENT
Start: 2021-07-13 | End: 2021-10-11

## 2021-08-07 ENCOUNTER — HOSPITAL ENCOUNTER (EMERGENCY)
Age: 46
Discharge: HOME | End: 2021-08-07
Payer: MEDICARE

## 2021-08-07 VITALS
HEART RATE: 77 BPM | OXYGEN SATURATION: 98 % | RESPIRATION RATE: 18 BRPM | DIASTOLIC BLOOD PRESSURE: 68 MMHG | BODY MASS INDEX: 22.6 KG/M2 | TEMPERATURE: 98.1 F | SYSTOLIC BLOOD PRESSURE: 108 MMHG

## 2021-08-07 VITALS
HEART RATE: 77 BPM | DIASTOLIC BLOOD PRESSURE: 68 MMHG | SYSTOLIC BLOOD PRESSURE: 108 MMHG | OXYGEN SATURATION: 98 % | RESPIRATION RATE: 18 BRPM | TEMPERATURE: 98.06 F

## 2021-08-07 DIAGNOSIS — B96.20: ICD-10-CM

## 2021-08-07 DIAGNOSIS — M79.7: ICD-10-CM

## 2021-08-07 DIAGNOSIS — Z79.899: ICD-10-CM

## 2021-08-07 DIAGNOSIS — N30.00: Primary | ICD-10-CM

## 2021-08-07 DIAGNOSIS — E03.9: ICD-10-CM

## 2021-08-07 LAB
PH,URINE: 7.5 (ref 5–8.5)
SPECIFIC GRAVITY, URINE: 1.01 (ref 1–1.03)
UROBILINOGEN,URINE: 1 EU/DL
UTC LEUKOCYTE ESTERASE,URINE: (no result)

## 2021-08-07 PROCEDURE — 87088 URINE BACTERIA CULTURE: CPT

## 2021-08-07 PROCEDURE — 81003 URINALYSIS AUTO W/O SCOPE: CPT

## 2021-08-07 PROCEDURE — 87086 URINE CULTURE/COLONY COUNT: CPT

## 2021-08-07 PROCEDURE — 99202 OFFICE O/P NEW SF 15 MIN: CPT

## 2021-08-07 PROCEDURE — G0463 HOSPITAL OUTPT CLINIC VISIT: HCPCS

## 2021-08-07 PROCEDURE — 87186 SC STD MICRODIL/AGAR DIL: CPT

## 2021-08-24 ENCOUNTER — DOCUMENTATION (OUTPATIENT)
Dept: PAIN MEDICINE | Facility: CLINIC | Age: 46
End: 2021-08-24

## 2021-08-24 ENCOUNTER — DISEASE STATE MANAGEMENT VISIT (OUTPATIENT)
Dept: PAIN MEDICINE | Facility: CLINIC | Age: 46
End: 2021-08-24

## 2021-08-24 NOTE — PROGRESS NOTES
Spoke with Reina at [facility]. Advised that patient missed today's appointment and it has not been rescheduled yet. We are to hold drug screen box until patient is seen. No further needs at this time.

## 2021-08-31 ENCOUNTER — TELEPHONE (OUTPATIENT)
Dept: NEUROSURGERY | Facility: CLINIC | Age: 46
End: 2021-08-31

## 2021-08-31 DIAGNOSIS — G90.511 COMPLEX REGIONAL PAIN SYNDROME TYPE 1 OF RIGHT UPPER EXTREMITY: ICD-10-CM

## 2021-08-31 RX ORDER — GABAPENTIN 600 MG/1
TABLET, FILM COATED ORAL
Qty: 30 TABLET | Refills: 0 | Status: SHIPPED | OUTPATIENT
Start: 2021-08-31 | End: 2021-09-28

## 2021-09-01 NOTE — TELEPHONE ENCOUNTER
Provider:  Ramone  Surgery Date:    Past Surgical History:   Procedure Laterality Date   • CERVICAL LAMINECTOMY DECOMPRESSION POSTERIOR Bilateral 10/16/2018    Procedure: PARTIAL C2, C3, C4 LAMINECTOMY SPINAL CORD STIMULATOR REMOVAL INSERTION OF SPINAL CORD STIMULATOR;  Surgeon: Jam Pack MD;  Location: [facility];  Service: Neurosurgery   • SPINAL CORD STIMULATOR IMPLANT N/A 10/16/2018    Procedure: REMOVAL AND REPLACEMENT SPINAL CORD STIMULATOR;  Surgeon: Jam Pack MD;  Location: [facility];  Service: Neurosurgery   • SPINAL CORD STIMULATOR IMPLANT N/A 4/28/2021    Procedure: SPINAL CORD STIMULATOR REVISION CERVICAL;  Surgeon: Jam Pack MD;  Location: [facility] OR;  Service: Neurosurgery;  Laterality: N/A;     Last visit:   Office Visit with Arthur Cornejo PA-C (05/10/2021)    Next visit: NA     Reason for call:     Pt requesting a letter of medical necessity to have post op help at home.

## 2021-09-02 ENCOUNTER — TELEPHONE (OUTPATIENT)
Dept: NEUROSURGERY | Facility: CLINIC | Age: 46
End: 2021-09-02

## 2021-09-10 ENCOUNTER — HOSPITAL ENCOUNTER (EMERGENCY)
Age: 46
Discharge: HOME | End: 2021-09-10
Payer: MEDICARE

## 2021-09-10 VITALS
DIASTOLIC BLOOD PRESSURE: 59 MMHG | OXYGEN SATURATION: 100 % | TEMPERATURE: 98.2 F | SYSTOLIC BLOOD PRESSURE: 101 MMHG | RESPIRATION RATE: 14 BRPM | HEART RATE: 97 BPM

## 2021-09-10 VITALS
SYSTOLIC BLOOD PRESSURE: 101 MMHG | HEART RATE: 97 BPM | DIASTOLIC BLOOD PRESSURE: 59 MMHG | TEMPERATURE: 98.24 F | RESPIRATION RATE: 14 BRPM

## 2021-09-10 VITALS — BODY MASS INDEX: 20.9 KG/M2

## 2021-09-10 DIAGNOSIS — E03.9: ICD-10-CM

## 2021-09-10 DIAGNOSIS — N30.00: ICD-10-CM

## 2021-09-10 DIAGNOSIS — M79.7: ICD-10-CM

## 2021-09-10 DIAGNOSIS — Z79.899: ICD-10-CM

## 2021-09-10 DIAGNOSIS — B34.9: Primary | ICD-10-CM

## 2021-09-10 DIAGNOSIS — Z20.822: ICD-10-CM

## 2021-09-10 LAB
BILIRUBIN,URINE: (no result)
KETONES,URINE: (no result)
PH,URINE: 7.5 (ref 5–8.5)
SPECIFIC GRAVITY, URINE: 1.02 (ref 1–1.03)
UROBILINOGEN,URINE: 1 EU/DL

## 2021-09-10 PROCEDURE — U0005 INFEC AGEN DETEC AMPLI PROBE: HCPCS

## 2021-09-10 PROCEDURE — 81003 URINALYSIS AUTO W/O SCOPE: CPT

## 2021-09-10 PROCEDURE — G0463 HOSPITAL OUTPT CLINIC VISIT: HCPCS

## 2021-09-10 PROCEDURE — U0003 INFECTIOUS AGENT DETECTION BY NUCLEIC ACID (DNA OR RNA); SEVERE ACUTE RESPIRATORY SYNDROME CORONAVIRUS 2 (SARS-COV-2) (CORONAVIRUS DISEASE [COVID-19]), AMPLIFIED PROBE TECHNIQUE, MAKING USE OF HIGH THROUGHPUT TECHNOLOGIES AS DESCRIBED BY CMS-2020-01-R: HCPCS

## 2021-09-10 PROCEDURE — C9803 HOPD COVID-19 SPEC COLLECT: HCPCS

## 2021-09-10 PROCEDURE — 99203 OFFICE O/P NEW LOW 30 MIN: CPT

## 2021-09-14 ENCOUNTER — TELEPHONE (OUTPATIENT)
Dept: PAIN MEDICINE | Facility: CLINIC | Age: 46
End: 2021-09-14

## 2021-09-14 NOTE — TELEPHONE ENCOUNTER
LVM for patient that she needs to call back and reschedule in order to continue receiving medication refills.

## 2021-09-28 DIAGNOSIS — G90.511 COMPLEX REGIONAL PAIN SYNDROME TYPE 1 OF RIGHT UPPER EXTREMITY: ICD-10-CM

## 2021-09-28 RX ORDER — GABAPENTIN 600 MG/1
TABLET, FILM COATED ORAL
Qty: 30 TABLET | Refills: 0 | Status: SHIPPED | OUTPATIENT
Start: 2021-09-28 | End: 2021-11-08

## 2021-09-28 RX ORDER — NORTRIPTYLINE HYDROCHLORIDE 10 MG/1
CAPSULE ORAL
Qty: 60 CAPSULE | Refills: 5 | Status: SHIPPED | OUTPATIENT
Start: 2021-09-28 | End: 2022-04-18

## 2021-10-01 ENCOUNTER — HOSPITAL ENCOUNTER (EMERGENCY)
Age: 46
Discharge: HOME | End: 2021-10-01
Payer: MEDICARE

## 2021-10-01 VITALS
RESPIRATION RATE: 19 BRPM | TEMPERATURE: 98.1 F | HEART RATE: 87 BPM | SYSTOLIC BLOOD PRESSURE: 103 MMHG | OXYGEN SATURATION: 99 % | DIASTOLIC BLOOD PRESSURE: 67 MMHG

## 2021-10-01 VITALS
RESPIRATION RATE: 16 BRPM | DIASTOLIC BLOOD PRESSURE: 67 MMHG | SYSTOLIC BLOOD PRESSURE: 103 MMHG | TEMPERATURE: 98.1 F | HEART RATE: 87 BPM

## 2021-10-01 VITALS — BODY MASS INDEX: 21.6 KG/M2
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
## 2021-10-01 DIAGNOSIS — S69.91XA: Primary | ICD-10-CM

## 2021-10-01 PROCEDURE — 73130 X-RAY EXAM OF HAND: CPT

## 2021-10-01 PROCEDURE — 29125 APPL SHORT ARM SPLINT STATIC: CPT

## 2021-10-01 PROCEDURE — 73090 X-RAY EXAM OF FOREARM: CPT

## 2021-10-01 PROCEDURE — 73110 X-RAY EXAM OF WRIST: CPT

## 2021-10-01 PROCEDURE — G0463 HOSPITAL OUTPT CLINIC VISIT: HCPCS

## 2021-10-01 PROCEDURE — 99203 OFFICE O/P NEW LOW 30 MIN: CPT

## 2021-10-04 ENCOUNTER — OFFICE VISIT (OUTPATIENT)
Dept: OBSTETRICS AND GYNECOLOGY | Facility: CLINIC | Age: 46
End: 2021-10-04

## 2021-10-04 VITALS
DIASTOLIC BLOOD PRESSURE: 80 MMHG | SYSTOLIC BLOOD PRESSURE: 102 MMHG | HEIGHT: 66 IN | WEIGHT: 134 LBS | BODY MASS INDEX: 21.53 KG/M2

## 2021-10-04 DIAGNOSIS — N39.0 RECURRENT UTI: ICD-10-CM

## 2021-10-04 DIAGNOSIS — R31.9 URINARY TRACT INFECTION WITH HEMATURIA, SITE UNSPECIFIED: Primary | ICD-10-CM

## 2021-10-04 DIAGNOSIS — K64.4 EXTERNAL HEMORRHOIDS: ICD-10-CM

## 2021-10-04 DIAGNOSIS — R10.2 PELVIC PAIN: ICD-10-CM

## 2021-10-04 DIAGNOSIS — Z30.011 ENCOUNTER FOR ORAL CONTRACEPTION INITIAL PRESCRIPTION: ICD-10-CM

## 2021-10-04 DIAGNOSIS — N39.0 URINARY TRACT INFECTION WITH HEMATURIA, SITE UNSPECIFIED: Primary | ICD-10-CM

## 2021-10-04 LAB
LEUKOCYTE EST, POC: ABNORMAL
PH UR: 1 [PH] (ref 5–8)
RBC # UR STRIP: ABNORMAL /UL

## 2021-10-04 PROCEDURE — 81003 URINALYSIS AUTO W/O SCOPE: CPT | Performed by: OBSTETRICS & GYNECOLOGY

## 2021-10-04 PROCEDURE — 99213 OFFICE O/P EST LOW 20 MIN: CPT | Performed by: OBSTETRICS & GYNECOLOGY

## 2021-10-04 RX ORDER — NITROFURANTOIN 25; 75 MG/1; MG/1
100 CAPSULE ORAL 2 TIMES DAILY
Qty: 14 CAPSULE | Refills: 0 | Status: SHIPPED | OUTPATIENT
Start: 2021-10-04 | End: 2021-10-11

## 2021-10-04 RX ORDER — BACLOFEN 10 MG/1
TABLET ORAL
Qty: 120 TABLET | Refills: 5 | Status: SHIPPED | OUTPATIENT
Start: 2021-10-04 | End: 2022-04-11

## 2021-10-04 RX ORDER — NORETHINDRONE ACETATE AND ETHINYL ESTRADIOL, ETHINYL ESTRADIOL AND FERROUS FUMARATE 1MG-10(24)
1 KIT ORAL DAILY
Qty: 84 TABLET | Refills: 3 | Status: SHIPPED | OUTPATIENT
Start: 2021-10-04 | End: 2022-11-08 | Stop reason: SDUPTHER

## 2021-10-04 NOTE — PROGRESS NOTES
Gynecologic Exam Note    Night Sweats   UTI   Menstrual Problems    Subjective   HPI  Nettie Araya is a 46 y.o. female, , who presents for Possible UTI.  Patient states has had 4 UTI lately ever time she thinks she's over it she isn't. Patient also states she having problems with her periods. One day she has blood next day not, also patient states she has been having night sweats.    She reports her periods are about once a month, but the date within the month is changing.  A couple months ago her periods were really heavy.  The last couple months she has noted that she begins her period, she will bleed for 2-3 days and then spot for a couple days.  So shorter periods, she typically bleeds for about 7-8 days.      She also c/o night sweats x 3-4 months.  This wakes her up from sleep, she is soaked and her bed is soaked.      Patient reports low back pain, pelvic cramping, +urinary frequency/urgency, + dysuria.  This has been present for the last 6 weeks.  She has had 3-4 recent UTIs over the last 6 weeks or so.  Urine done today in the office postive for protein, Leuk, and ++ for blood.    She does c/o pelvic pain that has concerned her given her h/o ovarian cyst.    She states she has experienced this problem for night sweats and periods has been happening 3-4 months UTI's last 4 months na.  She describes the severity as mild and severe.  She states that the problem is worsening.  The patient reports additional symptoms as moodiness, spotting and some discharge denies any odor and lower back pain.    Her last LMP was last period was 2.5 weeks ago  Periods are irregular, lasting 3 days.  Dysmenorrhea:moderate, occurring first 1-2 days of flow.  Patient reports problems with: with some burning with Urination.  Partner Status: Marital Status: .  New Partners since last visit: no.  Desires STD Screening: no.    LMP 21    Additional OB/GYN History   Current contraception: contraceptive  "methods: Tubal ligation  Desires to: do not start contraception  Last Pap :   Last Completed Pap Smear          PAP SMEAR (Every 3 Years) Next due on 2023  Pap IG, Rfx HPV ASCU              History of abnormal Pap smear: no  Last mammogram:   Last Completed Mammogram     This patient has no relevant Health Maintenance data.        Tobacco Usage?: No   OB History        4    Para   4    Term   3       1    AB        Living   3       SAB        TAB        Ectopic        Molar        Multiple        Live Births   3                Health Maintenance   Topic Date Due   • COLORECTAL CANCER SCREENING  Never done   • COVID-19 Vaccine (1) Never done   • TDAP/TD VACCINES (1 - Tdap) Never done   • HEPATITIS C SCREENING  Never done   • ANNUAL WELLNESS VISIT  Never done   • INFLUENZA VACCINE  10/01/2021   • Annual Gynecologic Pelvic and Breast Exam  2021   • PAP SMEAR  2023   • Pneumococcal Vaccine 0-64  Aged Out       The additional following portions of the patient's history were reviewed and updated as appropriate: allergies, current medications, past family history, past medical history, past social history, past surgical history and problem list.    Review of Systems    I have reviewed and agree with the HPI, ROS, and historical information as entered above. Sherri Gill MD    Objective   /80   Ht 167.6 cm (65.98\")   Wt 60.8 kg (134 lb)   BMI 21.64 kg/m²     Physical Exam  Vitals and nursing note reviewed. Exam conducted with a chaperone present.   Constitutional:       General: She is not in acute distress.     Appearance: Normal appearance.   HENT:      Head: Normocephalic and atraumatic.   Pulmonary:      Effort: Pulmonary effort is normal. No accessory muscle usage or respiratory distress.   Genitourinary:     General: Normal vulva.      Exam position: Lithotomy position.      Labia:         Right: No rash, tenderness, lesion or injury.         Left: No rash, " tenderness, lesion or injury.       Urethra: No prolapse, urethral swelling or urethral lesion.      Vagina: Normal. No signs of injury and foreign body. No vaginal discharge, erythema, tenderness, bleeding or lesions.      Cervix: No cervical motion tenderness, discharge, friability, lesion, erythema or cervical bleeding.      Uterus: Normal. Not enlarged, not fixed, not tender and no uterine prolapse.       Adnexa:         Right: Fullness present. No mass or tenderness.          Left: Fullness present. No mass or tenderness.        Comments: Bladder ttp  Neurological:      Mental Status: She is alert and oriented to person, place, and time.   Psychiatric:         Mood and Affect: Mood and affect normal.         Speech: Speech normal.         Assessment/Plan     Assessment     Problem List Items Addressed This Visit     None      Visit Diagnoses     Urinary tract infection with hematuria, site unspecified    -  Primary    Relevant Medications    nitrofurantoin, macrocrystal-monohydrate, (Macrobid) 100 MG capsule    Other Relevant Orders    POC Urinalysis Dipstick, Automated (Completed)    Urine Culture - Urine, Urine, Clean Catch    Recurrent UTI        Relevant Orders    Urine Culture - Urine, Urine, Clean Catch    Encounter for oral contraception initial prescription        Relevant Medications    Lo Loestrin Fe 1 MG-10 MCG / 10 MCG tablet    Pelvic pain        Relevant Orders    US Non-ob Transvaginal    External hemorrhoids        Relevant Orders    Ambulatory Referral to Colorectal Surgery (Completed)            Plan     Return for Annual physical in December .  1. Patient presents today with complaints of urinary frequency urgency and dysuria.  She has had multiple UTIs diagnosed over the last 6 weeks or so.  The patient also complaining of pelvic cramping and low back pain.  Urinalysis today is positive and we will send urine culture and begin treatment with Macrobid.  As the patient has had some many  recurrent episodes recently, she will return to the office for another urine culture after completion of her antibiotics to ensure that this infection has completely resolved.  To call if not improving.  2. Patient also with complaints of vasomotor symptoms and states she is very symptomatic and desires treatment for this.  We plan to begin low Loestrin and 3 samples of this were given today.  She was counseled on use of OCPs and will let us know if her symptoms are not improved or her periods not regulated.  3. The patient was counseled on risks of OCPs including increased risks for thromboembolic disease including dvt, pulmonary embolus, stroke and death.  We reviewed theoretical risks for breast cancer.  We also discussed risks for hypertension.  We reviewed that the risks are increased due to age being 35 or older and she desires to continue her OCPs.  The patient confirms she is a nonsmoker.  4. Patient with pelvic cramping and a sensation similar to her previously noted large ovarian cyst for which she underwent salpingo-oophorectomy.  Transvaginal ultrasound was performed today which revealed normal ovaries bilaterally and a normal uterus.  Patient also has a long history of constipation and we discussed over-the-counter treatment options for this.  This constipation may be responsible for the pelvic pain, low back pain and pelvic fullness.  5. Patient asking for referral for evaluation and treatment of severe hemorrhoids that have been present for many years.  Referral to colorectal today.  6. RTC for annual exam or as needed      Sherri Gill MD  10/04/2021

## 2021-10-06 LAB
BACTERIA UR CULT: NORMAL
BACTERIA UR CULT: NORMAL

## 2021-10-10 DIAGNOSIS — G90.511 COMPLEX REGIONAL PAIN SYNDROME TYPE 1 OF RIGHT UPPER EXTREMITY: ICD-10-CM

## 2021-10-11 RX ORDER — DULOXETIN HYDROCHLORIDE 60 MG/1
CAPSULE, DELAYED RELEASE ORAL
Qty: 90 CAPSULE | Refills: 0 | Status: SHIPPED | OUTPATIENT
Start: 2021-10-11 | End: 2021-11-16 | Stop reason: SDUPTHER

## 2021-10-13 ENCOUNTER — HOSPITAL ENCOUNTER (OUTPATIENT)
Dept: HOSPITAL 22 - RAD | Age: 46
End: 2021-10-13
Payer: MEDICARE

## 2021-10-13 ENCOUNTER — HOSPITAL ENCOUNTER (OUTPATIENT)
Dept: HOSPITAL 22 - OT | Age: 46
Discharge: HOME | End: 2021-10-13
Payer: MEDICARE

## 2021-10-13 DIAGNOSIS — S69.91XA: Primary | ICD-10-CM

## 2021-10-13 DIAGNOSIS — M25.531: Primary | ICD-10-CM

## 2021-10-13 DIAGNOSIS — M19.131: ICD-10-CM

## 2021-10-13 PROCEDURE — 73110 X-RAY EXAM OF WRIST: CPT

## 2021-10-13 PROCEDURE — 97763 ORTHC/PROSTC MGMT SBSQ ENC: CPT

## 2021-10-18 ENCOUNTER — TELEPHONE (OUTPATIENT)
Dept: PAIN MEDICINE | Facility: CLINIC | Age: 46
End: 2021-10-18

## 2021-10-18 NOTE — TELEPHONE ENCOUNTER
Patient reports that she spoke to a Nettie. She reports that she woke up sick in the bath. Advised that it is very important that she come to the next appointment as if she cancels again, [facility] may not reschedule patient.   She reports that she missed her last appointment due to her sister passing suddenly, her father in-law passing and some other family issues.   Appointment reminder card sent to patient.

## 2021-10-18 NOTE — TELEPHONE ENCOUNTER
Spoke with [employer]. Advised that patient did not show up to scheduled appointment today. This was noted in patient chart and they are to follow up in a few weeks.

## 2021-10-25 ENCOUNTER — CLINICAL SUPPORT (OUTPATIENT)
Dept: OBSTETRICS AND GYNECOLOGY | Facility: CLINIC | Age: 46
End: 2021-10-25

## 2021-10-25 DIAGNOSIS — R30.0 DYSURIA: Primary | ICD-10-CM

## 2021-10-25 NOTE — PROGRESS NOTES
Chief Complaint   Patient presents with   • Urinary Tract Infection       Subjective   HPI  Nettie Araya is a 46 y.o. female, , who presents for follow up on UTI.  Pt. Is here to leave [facility]    Urinary Tract Infection  Patient complains of frequency. She has had symptoms for 4 weeks.  PT was treated now  For follow up.    Health Maintenance   Topic Date Due   • COLORECTAL CANCER SCREENING  Never done   • COVID-19 Vaccine (1) Never done   • TDAP/TD VACCINES (1 - Tdap) Never done   • HEPATITIS C SCREENING  Never done   • ANNUAL WELLNESS VISIT  Never done   • INFLUENZA VACCINE  Never done   • Annual Gynecologic Pelvic and Breast Exam  2021   • PAP SMEAR  2023   • Pneumococcal Vaccine 0-64  Aged Out       The additional following portions of the patient's history were reviewed and updated as appropriate: current medications, past family history, past medical history, past social history, past surgical history and problem list.    Review of Systems    I have reviewed and agree with the HPI, ROS, and historical information as entered above. Simona Pacheco MA     UA Blood Trace  Keytone Trace   Ruddy   2+  Protein 1+   Sent for follow up culture.     Objective   There were no vitals taken for this visit.    Physical Exam    Assessment/Plan     Assessment     Problem List Items Addressed This Visit     None          1. Urine culture    Plan     1. No follow-ups on file.  2. Will f/u with culture results      Simona Pacheco MA  10/25/2021

## 2021-11-08 DIAGNOSIS — G90.511 COMPLEX REGIONAL PAIN SYNDROME TYPE 1 OF RIGHT UPPER EXTREMITY: ICD-10-CM

## 2021-11-08 RX ORDER — GABAPENTIN 600 MG/1
TABLET, FILM COATED ORAL
Qty: 30 TABLET | Refills: 0 | Status: SHIPPED | OUTPATIENT
Start: 2021-11-08 | End: 2021-11-16 | Stop reason: SDUPTHER

## 2021-11-16 ENCOUNTER — OFFICE VISIT (OUTPATIENT)
Dept: PAIN MEDICINE | Facility: CLINIC | Age: 46
End: 2021-11-16

## 2021-11-16 ENCOUNTER — DOCUMENTATION (OUTPATIENT)
Dept: PAIN MEDICINE | Facility: CLINIC | Age: 46
End: 2021-11-16

## 2021-11-16 VITALS
BODY MASS INDEX: 20.93 KG/M2 | TEMPERATURE: 98.4 F | HEIGHT: 66 IN | DIASTOLIC BLOOD PRESSURE: 64 MMHG | HEART RATE: 83 BPM | RESPIRATION RATE: 16 BRPM | WEIGHT: 130.2 LBS | OXYGEN SATURATION: 100 % | SYSTOLIC BLOOD PRESSURE: 99 MMHG

## 2021-11-16 DIAGNOSIS — R53.81 PHYSICAL DECONDITIONING: ICD-10-CM

## 2021-11-16 DIAGNOSIS — Z46.2 ENCOUNTER FOR FITTING AND ADJUSTMENT OF NEUROPACEMAKER OF SPINAL CORD: ICD-10-CM

## 2021-11-16 DIAGNOSIS — F32.A DEPRESSION, UNSPECIFIED DEPRESSION TYPE: ICD-10-CM

## 2021-11-16 DIAGNOSIS — M47.812 FACET ARTHROPATHY, CERVICAL: ICD-10-CM

## 2021-11-16 DIAGNOSIS — G56.81 SUPRASCAPULAR ENTRAPMENT NEUROPATHY OF RIGHT SIDE: ICD-10-CM

## 2021-11-16 DIAGNOSIS — G90.511 COMPLEX REGIONAL PAIN SYNDROME TYPE 1 OF RIGHT UPPER EXTREMITY: ICD-10-CM

## 2021-11-16 DIAGNOSIS — Z96.82 PRESENCE OF NEUROSTIMULATOR: ICD-10-CM

## 2021-11-16 PROCEDURE — 95970 ALYS NPGT W/O PRGRMG: CPT | Performed by: NURSE PRACTITIONER

## 2021-11-16 PROCEDURE — 99213 OFFICE O/P EST LOW 20 MIN: CPT | Performed by: NURSE PRACTITIONER

## 2021-11-16 RX ORDER — DULOXETIN HYDROCHLORIDE 60 MG/1
60 CAPSULE, DELAYED RELEASE ORAL DAILY
Qty: 90 CAPSULE | Refills: 5 | Status: SHIPPED | OUTPATIENT
Start: 2021-11-16 | End: 2022-07-20 | Stop reason: SDUPTHER

## 2021-11-16 RX ORDER — GABAPENTIN 600 MG/1
600 TABLET, FILM COATED ORAL DAILY
Qty: 30 TABLET | Refills: 5 | Status: SHIPPED | OUTPATIENT
Start: 2021-11-16 | End: 2022-05-19

## 2021-11-16 NOTE — PROGRESS NOTES
Maisha Bradley [phone] at [facility]. She will arrange  by Aki

## 2022-02-17 ENCOUNTER — TELEPHONE (OUTPATIENT)
Dept: OBSTETRICS AND GYNECOLOGY | Facility: CLINIC | Age: 47
End: 2022-02-17

## 2022-02-17 NOTE — TELEPHONE ENCOUNTER
Spoke with Claribel at [facility] in [place]. Advised that PA on BC was approved. Claribel stated that it went through and they would get ready for patient.

## 2022-02-17 NOTE — TELEPHONE ENCOUNTER
Attemped to call patient to let her know that her BC was approved by insurance and is being filled at the [facility] in [place].

## 2022-03-24 ENCOUNTER — HOSPITAL ENCOUNTER (OUTPATIENT)
Dept: HOSPITAL 22 - RAD | Age: 47
End: 2022-03-24
Payer: MEDICARE

## 2022-03-24 DIAGNOSIS — E27.8: Primary | ICD-10-CM

## 2022-03-24 PROCEDURE — 74170 CT ABD WO CNTRST FLWD CNTRST: CPT

## 2022-04-11 RX ORDER — BACLOFEN 10 MG/1
TABLET ORAL
Qty: 120 TABLET | Refills: 5 | Status: SHIPPED | OUTPATIENT
Start: 2022-04-11 | End: 2022-07-20 | Stop reason: SDUPTHER

## 2022-04-17 DIAGNOSIS — G90.511 COMPLEX REGIONAL PAIN SYNDROME TYPE 1 OF RIGHT UPPER EXTREMITY: ICD-10-CM

## 2022-04-18 RX ORDER — NORTRIPTYLINE HYDROCHLORIDE 10 MG/1
CAPSULE ORAL
Qty: 60 CAPSULE | Refills: 5 | Status: SHIPPED | OUTPATIENT
Start: 2022-04-18 | End: 2022-07-20 | Stop reason: SDUPTHER

## 2022-05-17 ENCOUNTER — TELEPHONE (OUTPATIENT)
Dept: PAIN MEDICINE | Facility: CLINIC | Age: 47
End: 2022-05-17

## 2022-05-17 NOTE — TELEPHONE ENCOUNTER
Received prior auth request for Nona.   Spoke with Elsy at [employer] at [phone]   No authorization needed, approved through July 30.   She will call pharmacy to assist.

## 2022-05-19 DIAGNOSIS — G90.511 COMPLEX REGIONAL PAIN SYNDROME TYPE 1 OF RIGHT UPPER EXTREMITY: ICD-10-CM

## 2022-05-19 RX ORDER — GABAPENTIN 600 MG/1
600 TABLET, FILM COATED ORAL DAILY
Qty: 30 TABLET | Refills: 2 | Status: SHIPPED | OUTPATIENT
Start: 2022-05-19 | End: 2022-07-20 | Stop reason: SDUPTHER

## 2022-06-08 ENCOUNTER — HOSPITAL ENCOUNTER (EMERGENCY)
Age: 47
Discharge: HOME | End: 2022-06-08
Payer: MEDICARE

## 2022-06-08 VITALS
HEART RATE: 84 BPM | OXYGEN SATURATION: 99 % | DIASTOLIC BLOOD PRESSURE: 75 MMHG | RESPIRATION RATE: 17 BRPM | SYSTOLIC BLOOD PRESSURE: 118 MMHG | TEMPERATURE: 98.96 F

## 2022-06-08 VITALS
TEMPERATURE: 99 F | RESPIRATION RATE: 17 BRPM | SYSTOLIC BLOOD PRESSURE: 118 MMHG | DIASTOLIC BLOOD PRESSURE: 75 MMHG | HEART RATE: 84 BPM

## 2022-06-08 VITALS — BODY MASS INDEX: 20.9 KG/M2

## 2022-06-08 DIAGNOSIS — S67.41XA: Primary | ICD-10-CM
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
## 2022-06-08 PROCEDURE — 99212 OFFICE O/P EST SF 10 MIN: CPT

## 2022-06-08 PROCEDURE — 73130 X-RAY EXAM OF HAND: CPT

## 2022-06-08 PROCEDURE — 73110 X-RAY EXAM OF WRIST: CPT

## 2022-06-08 PROCEDURE — G0463 HOSPITAL OUTPT CLINIC VISIT: HCPCS

## 2022-06-22 ENCOUNTER — TELEPHONE (OUTPATIENT)
Dept: NEUROSURGERY | Facility: CLINIC | Age: 47
End: 2022-06-22

## 2022-06-22 NOTE — TELEPHONE ENCOUNTER
Caller: ROSIE JIANG    Relationship: SELF    Best call back number: [phone]    What was the call regarding: PT CALLED PERTAINING TO AN ENCOUNTER THAT WAS SENT TO THE OFFICE 08/31/21.      Telephone with Laura Zapata PA-C (08/31/2021)      PT STATES THE LETTER NEEDS TO STATE THAT THE IT WAS MEDICALLY NECESSARY FOR SOMEONE TO ASSIST HER FOR THAT WEEK.  24/7 CARE FOR THE PT.      Do you require a callback:     PLEASE CALL PT  THANK YOU

## 2022-06-23 ENCOUNTER — DOCUMENTATION (OUTPATIENT)
Dept: PAIN MEDICINE | Facility: CLINIC | Age: 47
End: 2022-06-23

## 2022-06-23 NOTE — PROGRESS NOTES
Prior Auth Completed for [place] via [email or web address]  Key: [id number]  Approved   Coverage start 05/24/2022 Ends 06/23/2023    Patient notified

## 2022-06-24 NOTE — TELEPHONE ENCOUNTER
CALLER:  ROSIE APOLINAR    CONTACT #:  [phone]    CALL REGARDING:  PT CALLED AND STATES SHE RECEIVED THE LETTER BUT THAT WC IS REQUESTING IT STATE THAT SHE NEEDED HELP DAY AND NIGHT EVERYDAY FOR 24 HOUR CARE.     PLEASE CALL PT  THANK YOU

## 2022-06-27 NOTE — PROGRESS NOTES
Additional prior auths received. Spoke with pharmacy. They state that patient WC coverage stopped on 06/01/2022.   LVM for patient advising of above and advised that if additional information was required at the pharmacy for her drug coverage from  she should call them to provide that information or use her commercial insurance as it was approved from [facility].

## 2022-07-19 NOTE — PROGRESS NOTES
"Chief Complaint: \"I would like more stimulation in my right arm.\"       History of Present Illness:   Patient: Ms. Nettie Araya, 47 y.o. female was last seen on 11/16/2021 by me for follow-up evaluation and medication refill. Patient returns to the clinic for evaluation of her chronic pain, possible spinal cord stimulator reprogramming, and medication refill.  She underwent initial implantation of her spinal cord stimulator device on October 16, 2018, with Dr. Jam Pack.  She underwent recent revision of the Medtronic cervical spinal cord stimulator lead with extension to the bilateral C4 laminar space with Dr. Jam Pack on 4/28/2021.  Since revision surgery she is doing very well, and reports significant improvement in her hyperesthesia, hyperalgesia and allodynia in her arms, due to her CRPS which was the reason for her initial placement.    Radiation of pain: The pain does not radiate. Hyperesthesia, hyperalgesia and allodynia in her arms had been significantly improved since revision of her SCS device and reprogramming    Pain intensity today: 5/10 with stimulator \"turned on\"    Average pain intensity last week: 3/10  Pain intensity ranges from: 3/10 to 7/10  Aggravating factors: Pain increases with rotation and and especially extension of the cervical spine, with movement of her upper extremities involving her shoulders..   Alleviating factors: Pain decreases with analgesics and rest, and \"my spinal cord stimulator device\".   Associated symptoms:   Patient denies  pain, numbness and weakness in the bilateral upper lower extremities.   Patient denies  any new bladder or bowel problems.   Patient reports difficulties with her balance but denies recent falls.     Pain History:   Ms. Nettie Araya, 44 y.o. female has a long standing history of complex regional pain syndrome type 1 of the right shoulder as a result of a work related injury occurring in September 2009.  She underwent " explantation of spinal cord stimulator device inserted by Dr. Kaveh Randle on January 20, 2014 after it was discovered by X-rays in 2016 the lead on the left side of the posterior epidural space had migrated.  The patient had a history of multiple falls which could be the cause of the lead migration. On October 16, 2018 she underwent explantation of percutaneous SCS device followed by implantation of a spinal cord stimulator device with Dr. Jam Pack with Medtronic. Medtronic Specify SureScan 2x8 MRI paddle (MRI full body in 1.5 Bee) with the top electrodes projecting at the level of the superior endplate of the C2 vertebral level. IPG: PRXis with AdaptiveStim (MRI compatible full body). The device was implanted primarily for treatment of CRPS of the upper extremities.     Review of previous therapies and additional medical records:  Nettie Araya has already failed the following measures, including:   Conservative measures: oral analgesics, opioids, physical therapy, chiropractic therapy and TENs   Interventional measures: SGBs, SCS originally implanted in 1/2014 with Medtronic RestoreSensor MRI compatible Sure Scan with two leads with the top electrodes projecting at the level of the superior endplate of C3.   04/17/2017: bilateral cervical RFTC  Surgical measures: SCS implant revision as referenced above  Nettie Araya presents with significant comorbidities including anxiety and depression,  engaged in treatment., CRPS, and migraine headaches engaged in treatment.   In terms of current analgesics, Nettie Araya takes: Gralise 600 mg at bedtime, nortriptyline 10-20 mg qhs., Diclofenac, tizanidine 2 mg prn. Patient also takes Ambien, Ativan, Cymbalta, and Wellbutrin. Patient denies any side effects from her medications.   I have reviewed the [place] Report is consistent to medication reconciliation.    Global Pain Scale 11-01-  2018 05-22-  2019 11-13-  2019 06-15  2020  02-24 2021 11-16  2021 07-20  2022    Pain 8 15 17 11 16 11 10    Feelings 10 13 17 10 26 19 13    Clinical outcomes 15 19 20 13 20 14 13    Activities 10 10 11 11 4 17 8    GPS Total: 43 57 65 45 66 61       Review of Diagnostic Studies:  Cervical spine x-rays 2/25/2021: I have reviewed the images.  Spinal cord similar leads are projecting posterior to the C2 and C3 lamina in the midline.  Multilevel spondylitic changes are present most advanced at C4-C5 and C5-C6, with minimal anterolisthesis of C4 on C5.  CT of the cervical spine without contrast 3/19/2021: I have reviewed the images.  Vertebral body heights are well-maintained with some straightening of the normal cervical lordosis.  There is minimal retrolisthesis of C5 on C6.  Spinal cord stimulator leads are projecting posterior through the C2-C3 interlaminar space and out of the canal.  There are additionally multilevel spondylitic changes most likely advanced at C5-C6 with disc osteophyte complex formation.  MRI of the cervical spine without contrast 4/8/2021: Images were reviewed.  There is straightening of the normal cervical lordosis, vertebral body heights are well-maintained.  Area of signal void at C2 and C3 due to spinal cord stimulator, with leads outside of the spinal canal.  C2-C3: Bilateral facet arthritis, with mild right neuroforaminal stenosis, and minimal effacement of the thecal sac posteriorly.  C3-C4: Small disc osteophyte complex and bilateral facet arthritis with no additional spinal canal or neuroforaminal stenosis.  C5-C6: Disc osteophyte complex with bilateral facet arthritis and moderate to severe spinal canal stenosis and left neuroforaminal stenosis with mild right neuroforaminal stenosis.  C6-C7: Disc osteophyte complex and bilateral facet arthritis with moderate to severe spinal canal stenosis and mild to moderate bilateral neuroforaminal stenosis.  Right Shoulder MRI 12/2010 revealed small glenohumeral joint effusion,  capsulitis, tendinitis. No rotator cuff for labral tear was noted  X-ray of the right wrist on 04/30/2014, revealed linear sclerosis within the distal radial metaphysis consistent with healed fracture. There is irregularity of the ulnar styloid consistent with prior fracture. Ulnar positive variance. Mild soft tissue swelling about the wrist.     Review of Systems   Constitutional: Positive for activity change, diaphoresis and fatigue.   HENT: Positive for hearing loss, mouth sores, nosebleeds and sinus pressure.    Gastrointestinal: Positive for constipation.   Musculoskeletal: Positive for arthralgias, back pain, myalgias and neck pain.   Neurological: Positive for dizziness, weakness and light-headedness.   Psychiatric/Behavioral: Positive for decreased concentration and sleep disturbance. The patient is nervous/anxious.    All other systems reviewed and are negative.        Patient Active Problem List   Diagnosis   • Knee pain   • Depression   • Cervical facet arthropathy/cervical spondylosis without myelopathy   • Atrophy, muscle disuse   • Myofascial pain   • Muscle spasm   • Suprascapular entrapment neuropathy of right side   • Frozen shoulder   • Insomnia   • Mild obesity   • Chronic tension-type headache, not intractable   • Physical deconditioning   • Common migraine without aura   • Adrenal nodule (HCC)   • Lung nodule, multiple   • Encounter for fitting and adjustment of neuropacemaker of spinal cord   • Complex regional pain syndrome type 1 of right upper extremity   • CPRS 1 (complex regional pain syndrome I) of upper limb   • Spinal cord stimulator dysfunction (HCC)   • Cervicalgia       Past Medical History:   Diagnosis Date   • Abnormal MRI, shoulder     Right Shoulder MRI 12/2010 revealed small glenohumeral joint effusion, capsulitis, tendinitis. No rotator cuff for labral tear was noted.    • Abnormal x-ray     X-Ray of the right wrist on 04/30/2014, revealed linear sclerosis within the distal  radial metaphysis consistent with healed fracture. There is irregularity of the ulnar styloid consistent with prior fracture.     • Anxiety    • Depression    • History of constipation    • History of insomnia     Seconday to pain   • History of migraine headaches    • History of transfusion    • History of varicose veins    • Low back pain    • Pain in joint, shoulder region    • Raynaud's disease          Past Surgical History:   Procedure Laterality Date   • BACK SURGERY      SCS implant   • CERVICAL LAMINECTOMY DECOMPRESSION POSTERIOR Bilateral 10/16/2018    Procedure: PARTIAL C2, C3, C4 LAMINECTOMY SPINAL CORD STIMULATOR REMOVAL INSERTION OF SPINAL CORD STIMULATOR;  Surgeon: Jam Pack MD;  Location:  [facility];  Service: Neurosurgery   •  SECTION      x 3   • COLONOSCOPY     • OTHER SURGICAL HISTORY      Left Distal Radiolnar Joint Stabilization and Capsulodesis   • ROTATOR CUFF REPAIR      Rotator cuff repair followed by three surgeries and manipulation under anesthesia with Dr. Ludwig. Fourth surgery performed by Dr. Graves.  Removal of suture and subacromial decompression with lysis of adhesions 2011.   • SPINAL CORD STIMULATOR IMPLANT N/A 10/16/2018    Procedure: REMOVAL AND REPLACEMENT SPINAL CORD STIMULATOR;  Surgeon: Jam Pack MD;  Location:  [facility];  Service: Neurosurgery   • SPINAL CORD STIMULATOR IMPLANT N/A 2021    Procedure: SPINAL CORD STIMULATOR REVISION CERVICAL;  Surgeon: Jam Pack MD;  Location:  [facility];  Service: Neurosurgery;  Laterality: N/A;   • THYROIDECTOMY, PARTIAL     • TUBAL ABDOMINAL LIGATION     • WRIST SURGERY           Family History   Problem Relation Age of Onset   • Diabetes Mother    • Hypertension Mother    • Stroke Mother    • Lymphoma Father    • Lung cancer Father    • Brain cancer Father    • Liver cancer Father    • Heart disease Maternal Grandmother    • Breast cancer Neg Hx    • Ovarian cancer Neg Hx    • Uterine  cancer Neg Hx    • Colon cancer Neg Hx          Social History     Socioeconomic History   • Marital status:    Tobacco Use   • Smoking status: Never Smoker   • Smokeless tobacco: Never Used   Vaping Use   • Vaping Use: Never used   Substance and Sexual Activity   • Alcohol use: No   • Drug use: No   • Sexual activity: Yes     Partners: Male     Birth control/protection: Tubal ligation           Current Outpatient Medications:   •  Aimovig 140 MG/ML prefilled syringe, Inject 140 mg under the skin into the appropriate area as directed Every 30 (Thirty) Days., Disp: , Rfl:   •  amLODIPine (NORVASC) 2.5 MG tablet, Take 2.5 mg by mouth Daily., Disp: , Rfl:   •  baclofen (LIORESAL) 10 MG tablet, Take 1 tablet by mouth 4 (Four) Times a Day., Disp: 120 tablet, Rfl: 5  •  buPROPion SR (WELLBUTRIN SR) 150 MG 12 hr tablet, Take 150 mg by mouth Every 12 (Twelve) Hours., Disp: , Rfl: 0  •  chlorthalidone (HYGROTEN) 50 MG tablet, Take 1 tablet by mouth daily., Disp: , Rfl:   •  diclofenac (VOLTAREN) 50 MG EC tablet, Take 1 tablet by mouth 2 (Two) Times a Day., Disp: 60 tablet, Rfl: 5  •  DULoxetine (CYMBALTA) 60 MG capsule, Take 1 capsule by mouth Daily., Disp: 90 capsule, Rfl: 5  •  gabapentin, once-daily, (Gralise) 600 MG tablet tablet, Take 1 tablet by mouth Daily., Disp: 30 tablet, Rfl: 5  •  Gel Base gel, Lidocaine 10% Clonidine 0.1% Baclofen 5% Cyclobenzaprine 4% Amitriptyline 2% Gabapentin 10% Prilocaine 2% gel. Apply 3-4 times daily prn, Disp: 240 g, Rfl: prn  •  Lo Loestrin Fe 1 MG-10 MCG / 10 MCG tablet, Take 1 tablet by mouth Daily., Disp: 84 tablet, Rfl: 3  •  LORazepam (ATIVAN) 0.5 MG tablet, Take 0.5 mg by mouth 3 (Three) Times a Day As Needed., Disp: , Rfl:   •  nortriptyline (PAMELOR) 10 MG capsule, Take 1 capsule by mouth Every Night., Disp: 60 capsule, Rfl: 5  •  omeprazole (priLOSEC) 20 MG capsule, Take 20 mg by mouth Daily., Disp: , Rfl:   •  ondansetron (ZOFRAN) 4 MG tablet, Take 4 mg by mouth Every 8  "(Eight) Hours As Needed for Nausea or Vomiting., Disp: , Rfl:   •  traMADol (ULTRAM) 50 MG tablet, Take 1 tablet by mouth Every 6 (Six) Hours As Needed for Moderate Pain ., Disp: 60 tablet, Rfl: 0  •  traZODone (DESYREL) 100 MG tablet, Take 100 mg by mouth every night at bedtime., Disp: , Rfl:   •  ubrogepant 100 MG tablet, TAKE 1 TABLET BY MOUTH AT ONSET OF HEADACHE, Disp: , Rfl:   •  zolpidem (AMBIEN) 5 MG tablet, TAKE 1 TABLET BY MOUTH ONCE DAILY AT BEDTIME AS NEEDED FOR 30 DAYS, Disp: , Rfl:       No Known Allergies      /70   Pulse 80   Resp 14   Ht 167.6 cm (66\")   Wt 64.6 kg (142 lb 6.4 oz)   SpO2 100%   BMI 22.98 kg/m²       Physical Exam:  Constitutional: Patient is oriented to person, place, and time.   Patient appears well-developed and well-nourished.   Head: Normocephalic and atraumatic. Eyes: Conjunctivae and lids are normal. Pupils: Equal, round, reactive to light.   Neck: Trachea normal. Neck supple. No JVD present.   Lymphatic: No cervical adenopathy  Pulmonary Respiratory effort: No increased work of breathing or signs of respiratory distress.  Peripheral vascular exam: Normal.   Musculoskeletal   Gait and station: Gait evaluation demonstrated a normal gait   Cervical spine: Passive and active range of motion are limited but without significant pain. Extension, flexion, lateral flexion, rotation of the cervical spine increased and reproduced pain. Cervical facet joint loading maneuvers are positive.  Muscles: Presence of active trigger points levator scapulae.  Tenderness noted in the bilateral occipital and temporalis regions.  Shoulders: The range of motion of the glenohumeral joints is limited secondary to pain. Rotator cuff strength is 5/5.   Neurological:   Patient is alert and oriented to person, place, and time.   Speech: speech is normal.   Cortical function: Normal mental status.   Cranial nerves: Cranial nerves 2-12 intact.   Reflex Scores:  Right brachioradialis:2+  Left " brachioradialis: 2+  Right biceps: 2+  Left biceps: 2+  Right triceps: 2+  Left triceps: 2+  Motor strength: 5/5  Motor Tone: normal tone.   Involuntary movements: none.   Superficial/Primitive Reflexes: primitive reflexes were absent.   Right Downs: absent  Left Downs: absent  Right ankle clonus: absent  Left ankle clonus: absent   Babinsky: absent  No nuchal rigidity.  Spurling sign is negative. Neck tornado test is negative. Lhermitte sign is negative. Negative long tract signs.   Sensation: No sensory loss. Sensory exam: intact to light touch, intact pain and temperature sensation, intact vibration sensation and normal proprioception. There is a reduction in the hyperalgesia, hyperesthesia, and allodynia in the right forearm and right hand from prior exam. Minimal hyperalgesia, hyperesthesia, or allodynia on the left shoulder and left arm.  Coordination: Normal finger to nose and heel to shin. Normal balance and negative Romberg's sign   Skin and subcutaneous tissue: Spinal cord stimulator site: There is no redness, drainage, or fluid accumulation at the surgical sites. There is no tenderness upon mobilization of the IPG No rash noted. No cyanosis.   Psychiatric: Judgment and insight: Normal. Orientation to person, place and time: Normal. Recent and remote memory: Intact. Mood and affect: Normal.     PROCEDURE: Analysis of the spinal cord stimulator device with complex reprogramming  Analysis of the stimulator reveals that patient used the Medtronic spinal cord stimulator device average 60% of the time. Analysis of impedence reveals normal impedence for all contacts. The spinal cord simulator device has 3 programs.      Program A1 (Preferred Program)  Electrode polarities: 0+,3-, 8+, 11-  Amplitude: 2.0 mA     Pulse width: 460 mcs  Rate: 340 Hz     Time spent reprogrammin minutes    ASSESSMENT:   1. Complex regional pain syndrome type 1 of right upper extremity    2. Suprascapular entrapment neuropathy  of right side    3. Cervical facet arthropathy/cervical spondylosis without myelopathy    4. Presence of neurostimulator    5. Myofascial pain    6. Physical deconditioning    7. Depression, unspecified depression type        PLAN/MEDICAL DECISION MAKING: I had a lengthy conversation with Ms. Nettie Araya regarding her chronic pain condition and potential therapeutic options including risks, benefits, alternative therapies, to name a few.  Since revision of spinal cord stimulator device on 4/28/2021, she is doing very well with almost complete resolution of her CRPS symptoms.  She is doing well with current pain management therapy plan, SCS, and pharmacological regimen.  She has been in full compliance with medication regimen, SCS device, and follow-up evaluations.  I have reviewed all available patient's medical records as well as previous therapies as referenced above.  Therefore, I have proposed the following plan:  1. Follow up PRN for SCS reprogramming.  2. Follow up in 6 months for medication refill.  3. Pharmacological measures: Reviewed and discussed. Patient also takes trazodone, Ativan, Cymbalta, Ambien, Aimovig and Wellbutrin. Patient denies any side effects from her medications.   A. Continue Gralise 600 mg with evening meals.  B. Continue nortriptyline 10-20 mg at bedtime.  C. Continue baclofen 10 mg 4 times daily as needed for muscle spasms.  D. Continue Lidocaine 10%, clonidine 0.1%, baclofen 5%, cyclobenzaprine 4%, amitriptyline 2%, gabapentin 10%, prilocaine 2% gel, apply gel to the right shoulder as needed.  E. Continue Diclofenac 50 mg two times daily only when necessary for breakthrough pain. Patient has been instructed again not to take any other NSAIDs.  4. Long-term rehabilitation efforts:  A. The patient does not have a history of recent falls within the last 3 months. I did complete a risk assessment for falls.   B. Start an exercise program such as water therapy, daily walks for  fitness and progressive strength training  5. The patient has been instructed to contact my office with any questions or difficulties. The patient understands the plan and agrees to proceed accordingly.         Patient Care Team:  Randy Gonzales MD as PCP - General (Family Medicine)  Kaveh Randle MD as Consulting Physician (Pain Medicine)  Cielo Hernandez MD as Consulting Physician (Neurology)  Nettie Garcia DO as Consulting Physician (Neurology)  Jam Pack MD as Consulting Physician (Neurosurgery)  Nhi Plascencia APRN as Nurse Practitioner (Family Medicine)  Domingo Spear PA-C as Physician Assistant (Physician Assistant)  Sherri Gill MD as Gynecologist (Obstetrics and Gynecology)     New Medications Ordered This Visit   Medications   • baclofen (LIORESAL) 10 MG tablet     Sig: Take 1 tablet by mouth 4 (Four) Times a Day.     Dispense:  120 tablet     Refill:  5   • diclofenac (VOLTAREN) 50 MG EC tablet     Sig: Take 1 tablet by mouth 2 (Two) Times a Day.     Dispense:  60 tablet     Refill:  5   • DULoxetine (CYMBALTA) 60 MG capsule     Sig: Take 1 capsule by mouth Daily.     Dispense:  90 capsule     Refill:  5   • gabapentin, once-daily, (Gralise) 600 MG tablet tablet     Sig: Take 1 tablet by mouth Daily.     Dispense:  30 tablet     Refill:  5   • nortriptyline (PAMELOR) 10 MG capsule     Sig: Take 1 capsule by mouth Every Night.     Dispense:  60 capsule     Refill:  5         Future Appointments   Date Time Provider Department Center   1/4/2023 11:30 AM Corrina Holley APRN [facility]         [facility]

## 2022-07-20 ENCOUNTER — OFFICE VISIT (OUTPATIENT)
Dept: PAIN MEDICINE | Facility: CLINIC | Age: 47
End: 2022-07-20

## 2022-07-20 VITALS
BODY MASS INDEX: 22.88 KG/M2 | DIASTOLIC BLOOD PRESSURE: 70 MMHG | SYSTOLIC BLOOD PRESSURE: 110 MMHG | RESPIRATION RATE: 14 BRPM | WEIGHT: 142.4 LBS | HEIGHT: 66 IN | OXYGEN SATURATION: 100 % | HEART RATE: 80 BPM

## 2022-07-20 DIAGNOSIS — R53.81 PHYSICAL DECONDITIONING: ICD-10-CM

## 2022-07-20 DIAGNOSIS — G56.81 SUPRASCAPULAR ENTRAPMENT NEUROPATHY OF RIGHT SIDE: ICD-10-CM

## 2022-07-20 DIAGNOSIS — Z96.82 PRESENCE OF NEUROSTIMULATOR: ICD-10-CM

## 2022-07-20 DIAGNOSIS — M47.812 FACET ARTHROPATHY, CERVICAL: ICD-10-CM

## 2022-07-20 DIAGNOSIS — F32.A DEPRESSION, UNSPECIFIED DEPRESSION TYPE: ICD-10-CM

## 2022-07-20 DIAGNOSIS — G90.511 COMPLEX REGIONAL PAIN SYNDROME TYPE 1 OF RIGHT UPPER EXTREMITY: ICD-10-CM

## 2022-07-20 DIAGNOSIS — M79.18 MYOFASCIAL PAIN: ICD-10-CM

## 2022-07-20 PROCEDURE — 95972 ALYS CPLX SP/PN NPGT W/PRGRM: CPT | Performed by: NURSE PRACTITIONER

## 2022-07-20 PROCEDURE — 99213 OFFICE O/P EST LOW 20 MIN: CPT | Performed by: NURSE PRACTITIONER

## 2022-07-20 RX ORDER — GABAPENTIN 600 MG/1
600 TABLET, FILM COATED ORAL DAILY
Qty: 30 TABLET | Refills: 5 | Status: SHIPPED | OUTPATIENT
Start: 2022-07-20 | End: 2023-01-04 | Stop reason: SDUPTHER

## 2022-07-20 RX ORDER — DULOXETIN HYDROCHLORIDE 60 MG/1
60 CAPSULE, DELAYED RELEASE ORAL DAILY
Qty: 90 CAPSULE | Refills: 5 | Status: SHIPPED | OUTPATIENT
Start: 2022-07-20 | End: 2023-01-04 | Stop reason: SDUPTHER

## 2022-07-20 RX ORDER — BACLOFEN 10 MG/1
10 TABLET ORAL 4 TIMES DAILY
Qty: 120 TABLET | Refills: 5 | Status: SHIPPED | OUTPATIENT
Start: 2022-07-20

## 2022-07-20 RX ORDER — NORTRIPTYLINE HYDROCHLORIDE 10 MG/1
10 CAPSULE ORAL NIGHTLY
Qty: 60 CAPSULE | Refills: 5 | Status: SHIPPED | OUTPATIENT
Start: 2022-07-20 | End: 2022-10-31 | Stop reason: SDUPTHER

## 2022-08-17 ENCOUNTER — TELEPHONE (OUTPATIENT)
Dept: PAIN MEDICINE | Facility: CLINIC | Age: 47
End: 2022-08-17

## 2022-08-17 NOTE — TELEPHONE ENCOUNTER
LVM for patient to return call regarding Pharmacy change request sent by [facility] for home delivery  For filling long term workers compensation prescriptions.     Requested call back.

## 2022-10-03 ENCOUNTER — HOSPITAL ENCOUNTER (OUTPATIENT)
Age: 47
End: 2022-10-03
Payer: MEDICARE

## 2022-10-03 DIAGNOSIS — E27.8: Primary | ICD-10-CM

## 2022-10-03 LAB
ALBUMIN LEVEL: 3.9 G/DL (ref 3.5–5)
ALBUMIN/GLOB SERPL: 1.4 {RATIO} (ref 1.1–1.8)
ALP ISO SERPL-ACNC: 56 U/L (ref 38–126)
ALT SERPLBLD-CCNC: 12 U/L (ref 12–78)
ANION GAP SERPL CALC-SCNC: 13.2 MEQ/L (ref 5–15)
AST SERPL QL: 20 U/L (ref 14–36)
BILIRUBIN,TOTAL: 0.1 MG/DL (ref 0.2–1.3)
BUN SERPL-MCNC: 15 MG/DL (ref 7–17)
CALCIUM SPEC-MCNC: 9.2 MG/DL (ref 8.4–10.2)
CHLORIDE SPEC-SCNC: 98 MMOL/L (ref 98–107)
CO2 SERPL-SCNC: 33 MMOL/L (ref 22–30)
CREAT BLD-SCNC: 1 MG/DL (ref 0.52–1.04)
ESTIMATED GLOMERULAR FILT RATE: 59 ML/MIN (ref 60–?)
GFR (AFRICAN AMERICAN): 72 ML/MIN (ref 60–?)
GLOBULIN SER CALC-MCNC: 2.7 G/DL (ref 1.3–3.2)
GLUCOSE: 91 MG/DL (ref 74–100)
POTASSIUM: 3.2 MMOL/L (ref 3.5–5.1)
PROT SERPL-MCNC: 6.6 G/DL (ref 6.3–8.2)
SODIUM SPEC-SCNC: 141 MMOL/L (ref 136–145)

## 2022-10-03 PROCEDURE — 36415 COLL VENOUS BLD VENIPUNCTURE: CPT

## 2022-10-03 PROCEDURE — 80053 COMPREHEN METABOLIC PANEL: CPT

## 2022-10-05 ENCOUNTER — HOSPITAL ENCOUNTER (OUTPATIENT)
Dept: HOSPITAL 22 - RAD | Age: 47
End: 2022-10-05
Payer: MEDICARE

## 2022-10-05 DIAGNOSIS — E27.8: Primary | ICD-10-CM

## 2022-10-05 PROCEDURE — 74170 CT ABD WO CNTRST FLWD CNTRST: CPT

## 2022-10-31 DIAGNOSIS — G90.511 COMPLEX REGIONAL PAIN SYNDROME TYPE 1 OF RIGHT UPPER EXTREMITY: ICD-10-CM

## 2022-10-31 RX ORDER — NORTRIPTYLINE HYDROCHLORIDE 10 MG/1
10 CAPSULE ORAL NIGHTLY
Qty: 60 CAPSULE | Refills: 5 | Status: SHIPPED | OUTPATIENT
Start: 2022-10-31 | End: 2023-01-04 | Stop reason: SDUPTHER

## 2022-11-08 ENCOUNTER — TELEPHONE (OUTPATIENT)
Dept: OBSTETRICS AND GYNECOLOGY | Facility: CLINIC | Age: 47
End: 2022-11-08

## 2022-11-08 DIAGNOSIS — Z30.011 ENCOUNTER FOR ORAL CONTRACEPTION INITIAL PRESCRIPTION: ICD-10-CM

## 2022-11-08 RX ORDER — NORETHINDRONE ACETATE AND ETHINYL ESTRADIOL, ETHINYL ESTRADIOL AND FERROUS FUMARATE 1MG-10(24)
1 KIT ORAL DAILY
Qty: 84 TABLET | Refills: 0 | Status: SHIPPED | OUTPATIENT
Start: 2022-11-08 | End: 2023-03-06

## 2022-11-08 NOTE — TELEPHONE ENCOUNTER
Pt called requesting a refill on her Lo Loestrin Fe, pt needed to schedule annual appt. Schedule pt for 12/12/22 with Dr. Gill. Rx requested to be sent to [facility] [place], [place]. Rx sent per protocol.

## 2023-01-04 ENCOUNTER — OFFICE VISIT (OUTPATIENT)
Dept: PAIN MEDICINE | Facility: CLINIC | Age: 48
End: 2023-01-04
Payer: OTHER MISCELLANEOUS

## 2023-01-04 VITALS
TEMPERATURE: 97.5 F | BODY MASS INDEX: 23.37 KG/M2 | RESPIRATION RATE: 14 BRPM | WEIGHT: 145.4 LBS | DIASTOLIC BLOOD PRESSURE: 72 MMHG | HEIGHT: 66 IN | OXYGEN SATURATION: 98 % | SYSTOLIC BLOOD PRESSURE: 108 MMHG | HEART RATE: 76 BPM

## 2023-01-04 DIAGNOSIS — Z96.82 PRESENCE OF NEUROSTIMULATOR: ICD-10-CM

## 2023-01-04 DIAGNOSIS — M79.18 MYOFASCIAL PAIN: ICD-10-CM

## 2023-01-04 DIAGNOSIS — G90.511 COMPLEX REGIONAL PAIN SYNDROME TYPE 1 OF RIGHT UPPER EXTREMITY: ICD-10-CM

## 2023-01-04 DIAGNOSIS — R53.81 PHYSICAL DECONDITIONING: ICD-10-CM

## 2023-01-04 DIAGNOSIS — F32.A DEPRESSION, UNSPECIFIED DEPRESSION TYPE: ICD-10-CM

## 2023-01-04 DIAGNOSIS — Z46.2 ENCOUNTER FOR FITTING AND ADJUSTMENT OF NEUROPACEMAKER OF SPINAL CORD: ICD-10-CM

## 2023-01-04 DIAGNOSIS — G56.81 SUPRASCAPULAR ENTRAPMENT NEUROPATHY OF RIGHT SIDE: ICD-10-CM

## 2023-01-04 DIAGNOSIS — M48.02 CERVICAL SPINAL STENOSIS: ICD-10-CM

## 2023-01-04 DIAGNOSIS — M47.812 CERVICAL SPONDYLOSIS WITHOUT MYELOPATHY: ICD-10-CM

## 2023-01-04 PROCEDURE — 1160F RVW MEDS BY RX/DR IN RCRD: CPT | Performed by: NURSE PRACTITIONER

## 2023-01-04 PROCEDURE — 99213 OFFICE O/P EST LOW 20 MIN: CPT | Performed by: NURSE PRACTITIONER

## 2023-01-04 PROCEDURE — 95970 ALYS NPGT W/O PRGRMG: CPT | Performed by: NURSE PRACTITIONER

## 2023-01-04 PROCEDURE — 1125F AMNT PAIN NOTED PAIN PRSNT: CPT | Performed by: NURSE PRACTITIONER

## 2023-01-04 PROCEDURE — 1159F MED LIST DOCD IN RCRD: CPT | Performed by: NURSE PRACTITIONER

## 2023-01-04 RX ORDER — DULOXETIN HYDROCHLORIDE 60 MG/1
60 CAPSULE, DELAYED RELEASE ORAL DAILY
Qty: 90 CAPSULE | Refills: 5 | Status: SHIPPED | OUTPATIENT
Start: 2023-01-04

## 2023-01-04 RX ORDER — ACYCLOVIR 400 MG/1
400 TABLET ORAL 2 TIMES DAILY
COMMUNITY
Start: 2022-10-13

## 2023-01-04 RX ORDER — NORTRIPTYLINE HYDROCHLORIDE 10 MG/1
10 CAPSULE ORAL NIGHTLY
Qty: 60 CAPSULE | Refills: 5 | Status: SHIPPED | OUTPATIENT
Start: 2023-01-04

## 2023-01-04 RX ORDER — GABAPENTIN 600 MG/1
600 TABLET, FILM COATED ORAL DAILY
Qty: 30 TABLET | Refills: 5 | Status: SHIPPED | OUTPATIENT
Start: 2023-01-04

## 2023-01-04 NOTE — PROGRESS NOTES
Chief Complaint: \"I am having a lot of neck pain.\"       History of Present Illness:   Patient: Ms. Nettie Araya, 47 y.o. female was last seen on 11/16/2021 by me for follow-up evaluation and medication refill. Patient returns to the clinic for evaluation of her chronic pain, possible spinal cord stimulator reprogramming, and medication refill.  She underwent initial implantation of her spinal cord stimulator device on October 16, 2018, with Dr. Jam Pack.  She underwent recent revision of the Medtronic cervical spinal cord stimulator lead with extension to the bilateral C4 laminar space with Dr. Jam Pack on 4/28/2021.  Since revision surgery she is doing very well, and reports significant improvement in her hyperesthesia, hyperalgesia and allodynia in her arms, due to her CRPS which was the reason for her initial placement.  She does continue with mechanical neck pain, from which she has underwent treatment with bilateral cervical rhizotomies in the past.  Radiation of pain: The pain does not radiate.   Hyperesthesia, hyperalgesia and allodynia in her arms had been significantly improved since revision of her SCS device and reprogramming Pain intensity today: 5/10 with stimulator \"turned on\"    Average pain intensity last week: 3/10  Pain intensity ranges from: 3/10 to 7/10  Aggravating factors: Pain increases with flexion, rotation, and and especially extension of the cervical spine, with movement of her upper extremities involving her shoulders..   Alleviating factors: Pain decreases with analgesics and rest, and \"my spinal cord stimulator device\".   Associated symptoms:   Patient denies  pain, numbness and weakness in the bilateral upper lower extremities.   Patient denies  any new bladder or bowel problems.   Patient reports difficulties with her balance but denies recent falls.     Pain History:   Ms. Nettie Araya, 44 y.o. female has a long standing history of complex regional pain  syndrome type 1 of the right shoulder as a result of a work related injury occurring in September 2009.  She underwent explantation of spinal cord stimulator device inserted by Dr. Kaveh Randle on January 20, 2014 after it was discovered by X-rays in 2016 the lead on the left side of the posterior epidural space had migrated.  The patient had a history of multiple falls which could be the cause of the lead migration. On October 16, 2018 she underwent explantation of percutaneous SCS device followed by implantation of a spinal cord stimulator device with Dr. Jam Pack with Medtronic. MergeOpticstronic Specify SureScan 2x8 MRI paddle (MRI full body in 1.5 Bee) with the top electrodes projecting at the level of the superior endplate of the C2 vertebral level. IPG: Global Active with Eko USAStim (MRI compatible full body). The device was implanted primarily for treatment of CRPS of the upper extremities.     Review of previous therapies and additional medical records:  Nettie Araya has already failed the following measures, including:   Conservative measures: oral analgesics, opioids, physical therapy, chiropractic therapy and TENs   Interventional measures: SGBs, SCS originally implanted in 1/2014 with Medtronic RestoreSensor MRI compatible Sure Scan with two leads with the top electrodes projecting at the level of the superior endplate of C3.   04/17/2017: bilateral cervical RFTC  Surgical measures: SCS implant revision as referenced above  Nettie Araya presents with significant comorbidities including anxiety and depression,  engaged in treatment., CRPS, and migraine headaches engaged in treatment.   In terms of current analgesics, Nettie Araya takes: Gralise 600 mg at bedtime, nortriptyline 10-20 mg qhs., Diclofenac, tizanidine 2 mg prn. Patient also takes Ambien, Ativan, Cymbalta, and Wellbutrin. Patient denies any side effects from her medications.   I have reviewed the [place] Report is  consistent to medication reconciliation.    Global Pain Scale 11-01-  2018 05-22-  2019 11-13-  2019 06-15  2020 02-24  2021 11-16  2021 07-20  2022 01-04  2023     Pain 8 15 17 11 16 11 10      Feelings 10 13 17 10 26 19 13      Clinical outcomes 15 19 20 13 20 14 13      Activities 10 10 11 11 4 17 8      GPS Total: 43 57 65 45 66 61 43        Review of Diagnostic Studies:  Cervical spine x-rays 2/25/2021: I have reviewed the images.  Spinal cord similar leads are projecting posterior to the C2 and C3 lamina in the midline.  Multilevel spondylitic changes are present most advanced at C4-C5 and C5-C6, with minimal anterolisthesis of C4 on C5.  CT of the cervical spine without contrast 3/19/2021: I have reviewed the images.  Vertebral body heights are well-maintained with some straightening of the normal cervical lordosis.  There is minimal retrolisthesis of C5 on C6.  Spinal cord stimulator leads are projecting posterior through the C2-C3 interlaminar space and out of the canal.  There are additionally multilevel spondylitic changes most likely advanced at C5-C6 with disc osteophyte complex formation.  MRI of the cervical spine without contrast 4/8/2021: Images were reviewed.  There is straightening of the normal cervical lordosis, vertebral body heights are well-maintained.  Area of signal void at C2 and C3 due to spinal cord stimulator, with leads outside of the spinal canal.  C2-C3: Bilateral facet arthritis, with mild right neuroforaminal stenosis, and minimal effacement of the thecal sac posteriorly.  C3-C4: Small disc osteophyte complex and bilateral facet arthritis with no additional spinal canal or neuroforaminal stenosis.  C4-C5: Disc osteophyte complex with bilateral facet arthritis creating mild spinal canal stenosis no significant neuroforaminal stenosis.  C5-C6: Disc osteophyte complex with bilateral facet arthritis and moderate to severe spinal canal stenosis and left neuroforaminal stenosis with mild  right neuroforaminal stenosis.  C6-C7: Disc osteophyte complex and bilateral facet arthritis with moderate to severe spinal canal stenosis and mild to moderate bilateral neuroforaminal stenosis.  Right Shoulder MRI 12/2010 revealed small glenohumeral joint effusion, capsulitis, tendinitis. No rotator cuff for labral tear was noted  X-ray of the right wrist on 04/30/2014, revealed linear sclerosis within the distal radial metaphysis consistent with healed fracture. There is irregularity of the ulnar styloid consistent with prior fracture. Ulnar positive variance. Mild soft tissue swelling about the wrist.     Review of Systems   Constitutional: Positive for activity change, diaphoresis and fatigue.   HENT: Positive for hearing loss, mouth sores, nosebleeds and sinus pressure.    Gastrointestinal: Positive for constipation.   Musculoskeletal: Positive for arthralgias, back pain, myalgias and neck pain.   Neurological: Positive for dizziness, weakness and light-headedness.   Psychiatric/Behavioral: Positive for decreased concentration and sleep disturbance. The patient is nervous/anxious.    All other systems reviewed and are negative.        Patient Active Problem List   Diagnosis   • Knee pain   • Depression   • Cervical facet arthropathy/cervical spondylosis without myelopathy   • Atrophy, muscle disuse   • Myofascial pain   • Muscle spasm   • Suprascapular entrapment neuropathy of right side   • Frozen shoulder   • Insomnia   • Mild obesity   • Chronic tension-type headache, not intractable   • Physical deconditioning   • Common migraine without aura   • Adrenal nodule (HCC)   • Lung nodule, multiple   • Encounter for fitting and adjustment of neuropacemaker of spinal cord   • Complex regional pain syndrome type 1 of right upper extremity   • CPRS 1 (complex regional pain syndrome I) of upper limb   • Spinal cord stimulator dysfunction (HCC)   • Cervicalgia       Past Medical History:   Diagnosis Date   • Abnormal  MRI, shoulder     Right Shoulder MRI 2010 revealed small glenohumeral joint effusion, capsulitis, tendinitis. No rotator cuff for labral tear was noted.    • Abnormal x-ray     X-Ray of the right wrist on 2014, revealed linear sclerosis within the distal radial metaphysis consistent with healed fracture. There is irregularity of the ulnar styloid consistent with prior fracture.     • Anxiety    • Chronic pain disorder ??   • Depression    • Extremity pain ?   • Fractures ??   • Headache ??   • Headache, tension-type ??   • History of constipation    • History of insomnia     Seconday to pain   • History of migraine headaches    • History of transfusion    • History of varicose veins    • Low back pain    • Migraine ??   • Neck pain ??   • Pain in joint, shoulder region    • Raynaud's disease    • Reflex sympathetic dystrophy ??         Past Surgical History:   Procedure Laterality Date   • BACK SURGERY      SCS implant   • CERVICAL LAMINECTOMY DECOMPRESSION POSTERIOR Bilateral 10/16/2018    Procedure: PARTIAL C2, C3, C4 LAMINECTOMY SPINAL CORD STIMULATOR REMOVAL INSERTION OF SPINAL CORD STIMULATOR;  Surgeon: Jam Pcak MD;  Location: [facility] OR;  Service: Neurosurgery   •  SECTION      x 3   • COLONOSCOPY     • EPIDURAL BLOCK  ?   • FRACTURE SURGERY     • ORTHOPEDIC SURGERY  ??   • OTHER SURGICAL HISTORY      Left Distal Radiolnar Joint Stabilization and Capsulodesis   • ROTATOR CUFF REPAIR      Rotator cuff repair followed by three surgeries and manipulation under anesthesia with Dr. Ludwig. Fourth surgery performed by Dr. Graves.  Removal of suture and subacromial decompression with lysis of adhesions 2011.   • SPINAL CORD STIMULATOR IMPLANT N/A 10/16/2018    Procedure: REMOVAL AND REPLACEMENT SPINAL CORD STIMULATOR;  Surgeon: Jam Pack MD;  Location: [facility] OR;  Service: Neurosurgery   • SPINAL CORD STIMULATOR IMPLANT N/A 2021    Procedure: SPINAL CORD STIMULATOR  REVISION CERVICAL;  Surgeon: Jam Pack MD;  Location: [facility];  Service: Neurosurgery;  Laterality: N/A;   • SPINE SURGERY  ?   • THYROIDECTOMY, PARTIAL     • TUBAL ABDOMINAL LIGATION     • WRIST SURGERY           Family History   Problem Relation Age of Onset   • Diabetes Mother    • Hypertension Mother    • Stroke Mother    • Lymphoma Father    • Lung cancer Father    • Brain cancer Father    • Liver cancer Father    • Cancer Father    • Heart disease Maternal Grandmother    • COPD Brother    • Breast cancer Neg Hx    • Ovarian cancer Neg Hx    • Uterine cancer Neg Hx    • Colon cancer Neg Hx          Social History     Socioeconomic History   • Marital status:    Tobacco Use   • Smoking status: Never   • Smokeless tobacco: Never   Vaping Use   • Vaping Use: Never used   Substance and Sexual Activity   • Alcohol use: Never   • Drug use: Never   • Sexual activity: Yes     Partners: Male     Birth control/protection: Pill, Tubal ligation           Current Outpatient Medications:   •  acyclovir (ZOVIRAX) 400 MG tablet, Take 400 mg by mouth 2 (Two) Times a Day. for 10 days, Disp: , Rfl:   •  Aimovig 140 MG/ML prefilled syringe, Inject 140 mg under the skin into the appropriate area as directed Every 30 (Thirty) Days., Disp: , Rfl:   •  amLODIPine (NORVASC) 2.5 MG tablet, Take 2.5 mg by mouth Daily., Disp: , Rfl:   •  baclofen (LIORESAL) 10 MG tablet, Take 1 tablet by mouth 4 (Four) Times a Day., Disp: 120 tablet, Rfl: 5  •  buPROPion SR (WELLBUTRIN SR) 150 MG 12 hr tablet, Take 150 mg by mouth Every 12 (Twelve) Hours., Disp: , Rfl: 0  •  chlorthalidone (HYGROTEN) 50 MG tablet, Take 1 tablet by mouth daily., Disp: , Rfl:   •  diclofenac (VOLTAREN) 50 MG EC tablet, Take 1 tablet by mouth 2 (Two) Times a Day., Disp: 60 tablet, Rfl: 5  •  DULoxetine (CYMBALTA) 60 MG capsule, Take 1 capsule by mouth Daily., Disp: 90 capsule, Rfl: 5  •  gabapentin, once-daily, (Gralise) 600 MG tablet tablet, Take 1  tablet by mouth Daily., Disp: 30 tablet, Rfl: 5  •  Gel Base gel, Lidocaine 10% Clonidine 0.1% Baclofen 5% Cyclobenzaprine 4% Amitriptyline 2% Gabapentin 10% Prilocaine 2% gel. Apply 3-4 times daily prn, Disp: 240 g, Rfl: prn  •  Lo Loestrin Fe 1 MG-10 MCG / 10 MCG tablet, Take 1 tablet by mouth Daily., Disp: 84 tablet, Rfl: 0  •  LORazepam (ATIVAN) 0.5 MG tablet, Take 0.5 mg by mouth 3 (Three) Times a Day As Needed., Disp: , Rfl:   •  nortriptyline (PAMELOR) 10 MG capsule, Take 1 capsule by mouth Every Night. May take 1-2 tablets at night, Disp: 60 capsule, Rfl: 5  •  omeprazole (priLOSEC) 20 MG capsule, Take 20 mg by mouth Daily., Disp: , Rfl:   •  ondansetron (ZOFRAN) 4 MG tablet, Take 4 mg by mouth Every 8 (Eight) Hours As Needed for Nausea or Vomiting., Disp: , Rfl:   •  traZODone (DESYREL) 100 MG tablet, Take 100 mg by mouth every night at bedtime., Disp: , Rfl:   •  ubrogepant 100 MG tablet, TAKE 1 TABLET BY MOUTH AT ONSET OF HEADACHE, Disp: , Rfl:   •  zolpidem (AMBIEN) 5 MG tablet, TAKE 1 TABLET BY MOUTH ONCE DAILY AT BEDTIME AS NEEDED FOR 30 DAYS, Disp: , Rfl:   •  Gabapentin powder, , Disp: , Rfl:   •  traMADol (ULTRAM) 50 MG tablet, Take 1 tablet by mouth Every 6 (Six) Hours As Needed for Moderate Pain ., Disp: 60 tablet, Rfl: 0      No Known Allergies      /72 (BP Location: Left arm, Patient Position: Sitting, Cuff Size: Adult)   Pulse 76   Temp 97.5 °F (36.4 °C) (Infrared)   Resp 14   Ht 167.6 cm (66\")   Wt 66 kg (145 lb 6.4 oz)   SpO2 98%   BMI 23.47 kg/m²       Physical Exam:  Constitutional: Patient is oriented to person, place, and time.   Patient appears well-developed and well-nourished.   Head: Normocephalic and atraumatic. Eyes: Conjunctivae and lids are normal. Pupils: Equal, round, reactive to light.   Neck: Trachea normal. Neck supple. No JVD present.   Lymphatic: No cervical adenopathy  Pulmonary Respiratory effort: No increased work of breathing or signs of respiratory  distress.  Peripheral vascular exam: Normal.   Musculoskeletal   Gait and station: Gait evaluation demonstrated a normal gait   Cervical spine: Passive and active range of motion are limited secondary to pain. Extension, flexion, lateral flexion, rotation of the cervical spine increased and reproduced pain. Cervical facet joint loading maneuvers are positive.  Muscles: Presence of active trigger points levator scapulae.  Tenderness noted in the bilateral occipital and temporalis regions.  Shoulders: The range of motion of the glenohumeral joints is limited secondary to pain. Rotator cuff strength is 5/5.   Neurological:   Patient is alert and oriented to person, place, and time.   Speech: speech is normal.   Cortical function: Normal mental status.   Cranial nerves: Cranial nerves 2-12 intact.   Reflex Scores:  Right brachioradialis:2+  Left brachioradialis: 2+  Right biceps: 2+  Left biceps: 2+  Right triceps: 2+  Left triceps: 2+  Motor strength: 5/5  Motor Tone: normal tone.   Involuntary movements: none.   Superficial/Primitive Reflexes: primitive reflexes were absent.   Right Downs: absent  Left Downs: absent  Right ankle clonus: absent  Left ankle clonus: absent   Babinsky: absent  No nuchal rigidity.  Spurling sign is negative. Neck tornado test is negative. Lhermitte sign is negative. Negative long tract signs.   Sensation: No sensory loss. Sensory exam: intact to light touch, intact pain and temperature sensation, intact vibration sensation and normal proprioception. There is a reduction in the hyperalgesia, hyperesthesia, and allodynia in the right forearm and right hand from prior exam. Minimal hyperalgesia, hyperesthesia, or allodynia on the left shoulder and left arm.  Coordination: Normal finger to nose and heel to shin. Normal balance and negative Romberg's sign   Skin and subcutaneous tissue: No rash noted. No cyanosis.   Psychiatric: Judgment and insight: Normal. Orientation to person, place and  time: Normal. Recent and remote memory: Intact. Mood and affect: Normal.     PROCEDURE: Analysis of the spinal cord stimulator device without complex reprogramming  Analysis of the stimulator reveals that patient used the Medtronic spinal cord stimulator device average 60% of the time. Analysis of impedence reveals normal impedence for all contacts. The spinal cord simulator device has 3 programs.      Program A1 (Preferred Program)  Electrode polarities: 0+,3-, 8+, 11-  Amplitude: 2.0 mA     Pulse width: 460 mcs  Rate: 340 Hz         ASSESSMENT:   1. Cervical spondylosis without myelopathy    2. Complex regional pain syndrome type 1 of right upper extremity    3. Suprascapular entrapment neuropathy of right side    4. Cervical spinal stenosis    5. Myofascial pain    6. Presence of neurostimulator    7. Encounter for fitting and adjustment of neuropacemaker of spinal cord    8. Physical deconditioning    9. Depression, unspecified depression type        PLAN/MEDICAL DECISION MAKING: Ms. Nettie Araya, 47 y.o. female has a long standing history of complex regional pain syndrome type 1 of the right shoulder as a result of a work related injury occurring in September 2009. She underwent explantation of spinal cord stimulator device inserted by Dr. Kaveh Randle in 2014. She underwent recent revision of the Medtronic cervical spinal cord stimulator lead with extension to the bilateral C4 laminar space with Dr. Jam Pack on 4/28/2021.  Since revision surgery she is doing very well, and reports significant improvement in her hyperesthesia, hyperalgesia and allodynia in her arms, due to her CRPS which was the reason for her initial placement.  She does continue with mechanical neck pain, from which she has underwent treatment with bilateral cervical rhizotomies in the past.  Most recent MRI of the cervical spine in April 2021 revealed diffuse spondylosis, at C4-C5 there is a disc osteophyte complex with  bilateral facet arthritis creating mild spinal canal stenosis no significant neuroforaminal stenosis. At C5-C6 there is a disc osteophyte complex with bilateral facet arthritis and moderate to severe spinal canal stenosis and left neuroforaminal stenosis with mild right neuroforaminal stenosis. AC6-C7: Disc osteophyte complex and bilateral facet arthritis with moderate to severe spinal canal stenosis and mild to moderate bilateral neuroforaminal stenosis.  Her spinal cord stimulator has provided her with significant improvement in her CRPS symptoms, although she continues with diffuse neck pain, without radicular complaints.  Upon physical examination, her pain is induced with provocative facet maneuvers.  She remains in full compliance with medication regimen, SCS device and follow-up evaluations. I had a lengthy conversation with Ms. Nettie Araya regarding her chronic pain condition and potential therapeutic options including risks, benefits, alternative therapies, to name a few.  I have reviewed all available patient's medical records as well as previous therapies as referenced above.  Therefore, I have proposed the following plan:  1. Follow up PRN for SCS reprogramming and in 6 months for medication refill.  2. Interventional pain management measures: Patient will be scheduled for diagnostic bilateral cervical medial branch blocks at C4, C5, C6; for bilateral cervical facet joints at C4-C5, C5-C6, to clarify the origin of chronic refractory mechanical/axial cervicalgia. If patient experiences more than 80% pain relief along with significant improvement in the range of motion of the cervical spine, then, patient will be scheduled for bilateral cervical medial branch rhizotomies.    3. Pharmacological measures: Reviewed and discussed. Patient also takes trazodone, Ativan, Cymbalta, Ambien, Aimovig and Wellbutrin. Patient denies any side effects from her medications.   A. Continue Gralise 600 mg with  evening meals.  B. Continue nortriptyline 10-20 mg at bedtime.  C. Continue baclofen 10 mg 4 times daily as needed for muscle spasms.  D. Continue Lidocaine 10%, clonidine 0.1%, baclofen 5%, cyclobenzaprine 4%, amitriptyline 2%, gabapentin 10%, prilocaine 2% gel, apply gel to the right shoulder as needed.  E. Continue Diclofenac 50 mg two times daily only when necessary for breakthrough pain. Patient has been instructed again not to take any other NSAIDs.  4. Long-term rehabilitation efforts:  A. The patient does not have a history of recent falls within the last 3 months. I did complete a risk assessment for falls.   B. Start an exercise program such as water therapy, daily walks for fitness and progressive strength training  5. The patient has been instructed to contact my office with any questions or difficulties. The patient understands the plan and agrees to proceed accordingly.         Patient Care Team:  Randy Gonzales MD as PCP - General (Family Medicine)  Kaveh Randle MD as Consulting Physician (Pain Medicine)  Cielo Hernandez MD as Consulting Physician (Neurology)  Nettie Garcia DO as Consulting Physician (Neurology)  Jam Pack MD as Consulting Physician (Neurosurgery)  Nhi Plascencia APRN as Nurse Practitioner (Family Medicine)  Domingo Spear PA-C as Physician Assistant (Physician Assistant)  Sherri Gill MD as Gynecologist (Obstetrics and Gynecology)  Corrina Holley APRN as Nurse Practitioner (Nurse Practitioner)     No orders of the defined types were placed in this encounter.        No future appointments.      LAURO Oakley

## 2023-01-05 DIAGNOSIS — M47.812 CERVICAL SPONDYLOSIS WITHOUT MYELOPATHY: Primary | ICD-10-CM

## 2023-01-10 ENCOUNTER — HOSPITAL ENCOUNTER (OUTPATIENT)
Dept: GENERAL RADIOLOGY | Facility: HOSPITAL | Age: 48
Discharge: HOME | End: 2023-01-10
Admitting: NURSE PRACTITIONER
Payer: OTHER MISCELLANEOUS

## 2023-01-10 DIAGNOSIS — M47.812 CERVICAL SPONDYLOSIS WITHOUT MYELOPATHY: ICD-10-CM

## 2023-01-10 PROCEDURE — 72052 X-RAY EXAM NECK SPINE 6/>VWS: CPT

## 2023-01-23 ENCOUNTER — OUTSIDE FACILITY SERVICE (OUTPATIENT)
Dept: PAIN MEDICINE | Facility: CLINIC | Age: 48
End: 2023-01-23
Payer: OTHER MISCELLANEOUS

## 2023-01-23 DIAGNOSIS — M47.812 CERVICAL SPONDYLOSIS WITHOUT MYELOPATHY: Primary | ICD-10-CM

## 2023-01-23 PROCEDURE — 64491 INJ PARAVERT F JNT C/T 2 LEV: CPT | Performed by: ANESTHESIOLOGY

## 2023-01-23 PROCEDURE — 64490 INJ PARAVERT F JNT C/T 1 LEV: CPT | Performed by: ANESTHESIOLOGY

## 2023-01-23 PROCEDURE — 99152 MOD SED SAME PHYS/QHP 5/>YRS: CPT | Performed by: ANESTHESIOLOGY

## 2023-01-24 ENCOUNTER — TELEPHONE (OUTPATIENT)
Dept: PAIN MEDICINE | Facility: CLINIC | Age: 48
End: 2023-01-24
Payer: MEDICARE

## 2023-01-24 NOTE — TELEPHONE ENCOUNTER
FOLLOW-UP CALL AFTER PROCEDURE  Spoke with pt regarding how they are feeling after yesterday's procedure with Dr. Randle. Patient advises that they are doing well.   Pain level before procedure: 8-9/10   Pain level after procedure: 0/10  Patient reports that the pain relief lasted for  Ongoing hours. (pain level 4-5/10).    Patient denies side effects or complications  Patient does not have any questions or concerns at this time. Advised that as soon as we get Workers comp approval we will give her a call. Pt understood.

## 2023-02-06 ENCOUNTER — OUTSIDE FACILITY SERVICE (OUTPATIENT)
Dept: PAIN MEDICINE | Facility: CLINIC | Age: 48
End: 2023-02-06
Payer: OTHER MISCELLANEOUS

## 2023-02-06 PROCEDURE — 64634 DESTROY C/TH FACET JNT ADDL: CPT | Performed by: ANESTHESIOLOGY

## 2023-02-06 PROCEDURE — 64633 DESTROY CERV/THOR FACET JNT: CPT | Performed by: ANESTHESIOLOGY

## 2023-02-06 PROCEDURE — 99152 MOD SED SAME PHYS/QHP 5/>YRS: CPT | Performed by: ANESTHESIOLOGY

## 2023-02-07 ENCOUNTER — TELEPHONE (OUTPATIENT)
Dept: PAIN MEDICINE | Facility: CLINIC | Age: 48
End: 2023-02-07
Payer: MEDICARE

## 2023-02-07 NOTE — TELEPHONE ENCOUNTER
LVM for pt regarding yesterday’s procedure with Dr. Randle and how they are feeling.  Advised of follow up appointment, and to call the office as needed.

## 2023-03-06 ENCOUNTER — OFFICE VISIT (OUTPATIENT)
Dept: OBSTETRICS AND GYNECOLOGY | Facility: CLINIC | Age: 48
End: 2023-03-06
Payer: MEDICARE

## 2023-03-06 VITALS
HEIGHT: 66 IN | DIASTOLIC BLOOD PRESSURE: 60 MMHG | WEIGHT: 143.8 LBS | BODY MASS INDEX: 23.11 KG/M2 | SYSTOLIC BLOOD PRESSURE: 100 MMHG

## 2023-03-06 DIAGNOSIS — N95.1 PERIMENOPAUSAL VASOMOTOR SYMPTOMS: ICD-10-CM

## 2023-03-06 DIAGNOSIS — Z01.419 ENCOUNTER FOR ANNUAL ROUTINE GYNECOLOGICAL EXAMINATION: Primary | ICD-10-CM

## 2023-03-06 DIAGNOSIS — Z12.31 BREAST CANCER SCREENING BY MAMMOGRAM: ICD-10-CM

## 2023-03-06 DIAGNOSIS — Z12.11 COLON CANCER SCREENING: ICD-10-CM

## 2023-03-06 PROCEDURE — 99396 PREV VISIT EST AGE 40-64: CPT | Performed by: OBSTETRICS & GYNECOLOGY

## 2023-03-06 RX ORDER — ESTRADIOL AND NORETHINDRONE ACETATE 1; .5 MG/1; MG/1
1 TABLET ORAL DAILY
Qty: 30 TABLET | Refills: 11 | Status: SHIPPED | OUTPATIENT
Start: 2023-03-06

## 2023-03-06 NOTE — PROGRESS NOTES
Gynecologic Annual Exam Note          CC - Here for annual exam.     Subjective     HPI  Nettie Araya is a 48 y.o. female, , who presents for annual well woman exam.  She is perimenopausal .   No LMP recorded (lmp unknown). Patient is perimenopausal..  Periods are absent , lasting 0 days.  Dysmenorrhea: mild to moderate Patient reports problems with: vaginal dryness, hot flashes, night sweats, insomnia, moodiness and irritability .  Partner Status: Marital Status: .  New Partners since last visit: no.  The patient Reports vasomotor symptoms.     The patient has any complaints today. She would like to discuss her symptoms and request refills of her birth control. She is having some urinary incontinence.  She exercises regularly: yes.  She has concerns about domestic violence: no.      Additional OB/GYN History   Current contraception: contraceptive methods: Tubal ligation  Desires to: continue contraception  History of abnormal Pap smear: no  Family history of uterine, colon, breast, or ovarian cancer: no  Performs monthly Self-Breast Exam: yes  Feelings of Anxiety or Depression: yes - controlled with medication     Last Pap :   Last Completed Pap Smear          Ordered - PAP SMEAR (Every 3 Years) Ordered on 3/6/2023    2020  Pap IG, Rfx HPV ASCU                Last mammogram:   Last Completed Mammogram     This patient has no relevant Health Maintenance data.          Last colonoscopy: Never    Last Completed Colonoscopy     This patient has no relevant Health Maintenance data.            Tobacco Usage?: No   OB History        4    Para   4    Term   3       1    AB        Living   3       SAB        IAB        Ectopic        Molar        Multiple        Live Births   3                  The additional following portions of the patient's history were reviewed and updated as appropriate: allergies, current medications, past family history, past medical history, past  social history, past surgical history and problem list.    Past Medical History:   Diagnosis Date   • Abnormal MRI, shoulder     Right Shoulder MRI 2010 revealed small glenohumeral joint effusion, capsulitis, tendinitis. No rotator cuff for labral tear was noted.    • Abnormal x-ray     X-Ray of the right wrist on 2014, revealed linear sclerosis within the distal radial metaphysis consistent with healed fracture. There is irregularity of the ulnar styloid consistent with prior fracture.     • Anxiety    • Chronic pain disorder ??   • Depression    • Extremity pain ?   • Fractures ??   • Headache ??   • Headache, tension-type ??   • History of constipation    • History of insomnia     Seconday to pain   • History of migraine headaches    • History of transfusion    • History of varicose veins    • Low back pain    • Migraine ??   • Neck pain ??   • Pain in joint, shoulder region    • Raynaud's disease    • Reflex sympathetic dystrophy ??        Past Surgical History:   Procedure Laterality Date   • BACK SURGERY      SCS implant   • CERVICAL LAMINECTOMY DECOMPRESSION POSTERIOR Bilateral 10/16/2018    Procedure: PARTIAL C2, C3, C4 LAMINECTOMY SPINAL CORD STIMULATOR REMOVAL INSERTION OF SPINAL CORD STIMULATOR;  Surgeon: Jam Pack MD;  Location: [facility];  Service: Neurosurgery   •  SECTION      x 3   • COLONOSCOPY     • EPIDURAL BLOCK  ?   • FRACTURE SURGERY     • ORTHOPEDIC SURGERY  ??   • OTHER SURGICAL HISTORY      Left Distal Radiolnar Joint Stabilization and Capsulodesis   • ROTATOR CUFF REPAIR      Rotator cuff repair followed by three surgeries and manipulation under anesthesia with Dr. Ludwig. Fourth surgery performed by Dr. Graves.  Removal of suture and subacromial decompression with lysis of adhesions 2011.   • SPINAL CORD STIMULATOR IMPLANT N/A 10/16/2018    Procedure: REMOVAL AND REPLACEMENT SPINAL CORD STIMULATOR;  Surgeon: Jma Pack MD;  Location: [facility];   "Service: Neurosurgery   • SPINAL CORD STIMULATOR IMPLANT N/A 04/28/2021    Procedure: SPINAL CORD STIMULATOR REVISION CERVICAL;  Surgeon: Jam Pack MD;  Location: [facility];  Service: Neurosurgery;  Laterality: N/A;   • SPINE SURGERY  ?   • THYROIDECTOMY, PARTIAL     • TUBAL ABDOMINAL LIGATION     • WRIST SURGERY          Review of Systems   Endocrine: Positive for heat intolerance (hot flashes/night sweats ).   Genitourinary: Positive for urinary incontinence and vaginal pain (vaginal dryness ).   All other systems reviewed and are negative.      I have reviewed and agree with the HPI, ROS, and historical information as entered above. Sherri Gill MD    Objective   /60   Ht 167.6 cm (65.98\")   Wt 65.2 kg (143 lb 12.8 oz)   LMP  (LMP Unknown) Comment: 3 months ago  BMI 23.22 kg/m²     Physical Exam  Vitals and nursing note reviewed. Exam conducted with a chaperone present.   Constitutional:       General: She is awake.   HENT:      Head: Normocephalic and atraumatic.   Neck:      Thyroid: No thyroid mass, thyromegaly or thyroid tenderness.   Cardiovascular:      Rate and Rhythm: Normal rate.      Heart sounds: No murmur heard.    No friction rub. No gallop.   Pulmonary:      Effort: Pulmonary effort is normal.      Breath sounds: Normal breath sounds. No stridor. No wheezing, rhonchi or rales.   Chest:      Chest wall: No mass or tenderness.   Breasts:     Right: Normal. No bleeding, inverted nipple, mass, nipple discharge, skin change or tenderness.      Left: Normal. No bleeding, inverted nipple, mass, nipple discharge, skin change or tenderness.   Abdominal:      Palpations: Abdomen is soft.      Tenderness: There is no guarding or rebound.      Hernia: There is no hernia in the left inguinal area or right inguinal area.   Genitourinary:     General: Normal vulva.      Pubic Area: No rash.       Labia:         Right: No rash, tenderness, lesion or injury.         Left: No rash, tenderness, " lesion or injury.       Urethra: No prolapse, urethral swelling or urethral lesion.      Vagina: No foreign body. No vaginal discharge, erythema, tenderness, bleeding or lesions.      Cervix: No cervical motion tenderness, discharge, friability, lesion, erythema or cervical bleeding.      Uterus: Normal. Not deviated, not enlarged, not fixed and not tender.       Adnexa: Right adnexa normal and left adnexa normal.        Right: No mass, tenderness or fullness.          Left: No mass, tenderness or fullness.        Rectum: No external hemorrhoid.   Musculoskeletal:      Cervical back: Normal range of motion.   Lymphadenopathy:      Upper Body:      Right upper body: No supraclavicular or axillary adenopathy.      Left upper body: No supraclavicular or axillary adenopathy.   Skin:     General: Skin is warm.   Neurological:      Mental Status: She is alert and oriented to person, place, and time.   Psychiatric:         Mood and Affect: Mood and affect normal.         Speech: Speech normal.         Assessment & Plan     Assessment     Problem List Items Addressed This Visit    None  Visit Diagnoses     Encounter for annual routine gynecological examination    -  Primary    Relevant Orders    LIQUID-BASED PAP SMEAR, [facility] ([facility])    Breast cancer screening by mammogram        Relevant Orders    Mammo Screening Digital Tomosynthesis Bilateral With CAD    Colon cancer screening        Relevant Orders    Ambulatory Referral For Screening Colonoscopy (Completed)    Perimenopausal vasomotor symptoms        Relevant Medications    estradiol-norethindrone (Activella) 1-0.5 MG per tablet          Plan     1. Reviewed monthly self breast exams.  Instructed to call with lumps, pain, or breast discharge.  Yearly mammograms ordered.  2. Ordered mammogram today.  3. Symptoms of menopausal transition reviewed with patient.   4. RTC in 1 year or PRN with problems.  5. Patient complaining of significant vasomotor symptoms.  She  has been on low Loestrin to control her cycles and will rarely have bleeding.  She has bothersome vasomotor symptoms, mood issues, difficulty sleeping.  We discussed transitioning her to estradiol to see if this helps control her symptoms more.  She is status post bilateral tubal ligation.  Risks of medications were discussed with the patient today.  Call if not improving.  Patient counseled that hormone treatment should be used to help with specific symptoms related to menopause such as hot flashes, night sweats and vaginal atrophy.   The lowest dose hormone that treats symptoms should be used for the shortest amount of time possible.  Although estrogen is the most effective treatment for hot flashes, other non hormonal options exist (such as Brisdelle or venlafaxine) and should also be considered.  Anyone with an intact uterus should also receive progestogen to prevent uterine overgrowth that can lead to uterine cancer.  All hormone therapy, whether it is synthetic or bio identical, can lead to increased risk of thromboembolic diseases such as DVT, pulmonary embolism, stroke, heart attack and death.  These adverse events are more likely to develop in the first year of use and in patients who are older than the typical menopausal age.  Risks of estrogen dependent cancers such as breast cancer increase with prolonged use.  Attempts to wean hormone therapy should be discussed annually and particularly after three to five years of use.  Any side effects such as vaginal bleeding or pain should be reported immediately.        Sherri Gill MD  03/06/2023

## 2023-03-07 ENCOUNTER — TELEPHONE (OUTPATIENT)
Dept: OBSTETRICS AND GYNECOLOGY | Facility: CLINIC | Age: 48
End: 2023-03-07
Payer: MEDICARE

## 2023-03-07 NOTE — TELEPHONE ENCOUNTER
PA needed for Activella - Approved today  Case Id:[id number] Review Type:Prior Auth;Coverage Start Date:02/05/2023;Coverage End Date:03/06/2024.

## 2023-03-07 NOTE — TELEPHONE ENCOUNTER
Activenirava was Approved today Case Id:[id number];Review Type:Prior Auth;Coverage Start Date:02/05/2023;Coverage End Date:03/06/2024, pharmacy notified 4.15 cents.

## 2023-03-16 LAB — REF LAB TEST METHOD: NORMAL

## 2023-03-24 ENCOUNTER — HOSPITAL ENCOUNTER (EMERGENCY)
Age: 48
Discharge: HOME | End: 2023-03-24
Payer: MEDICARE

## 2023-03-24 VITALS
TEMPERATURE: 98.06 F | RESPIRATION RATE: 18 BRPM | DIASTOLIC BLOOD PRESSURE: 53 MMHG | HEART RATE: 82 BPM | OXYGEN SATURATION: 95 % | SYSTOLIC BLOOD PRESSURE: 121 MMHG

## 2023-03-24 VITALS — BODY MASS INDEX: 23.4 KG/M2 | BODY MASS INDEX: 22.3 KG/M2

## 2023-03-24 VITALS
OXYGEN SATURATION: 95 % | SYSTOLIC BLOOD PRESSURE: 121 MMHG | HEART RATE: 82 BPM | DIASTOLIC BLOOD PRESSURE: 53 MMHG | RESPIRATION RATE: 18 BRPM

## 2023-03-24 VITALS
SYSTOLIC BLOOD PRESSURE: 121 MMHG | HEART RATE: 82 BPM | RESPIRATION RATE: 18 BRPM | OXYGEN SATURATION: 95 % | DIASTOLIC BLOOD PRESSURE: 53 MMHG | TEMPERATURE: 98.06 F

## 2023-03-24 DIAGNOSIS — M25.512: Primary | ICD-10-CM

## 2023-03-24 PROCEDURE — 99212 OFFICE O/P EST SF 10 MIN: CPT

## 2023-03-24 PROCEDURE — G0463 HOSPITAL OUTPT CLINIC VISIT: HCPCS

## 2023-03-24 PROCEDURE — 99214 OFFICE O/P EST MOD 30 MIN: CPT

## 2023-03-24 PROCEDURE — 96372 THER/PROPH/DIAG INJ SC/IM: CPT

## 2023-03-27 ENCOUNTER — OFFICE VISIT (OUTPATIENT)
Dept: OBSTETRICS AND GYNECOLOGY | Facility: CLINIC | Age: 48
End: 2023-03-27
Payer: MEDICARE

## 2023-03-27 ENCOUNTER — TELEPHONE (OUTPATIENT)
Dept: PAIN MEDICINE | Facility: CLINIC | Age: 48
End: 2023-03-27
Payer: MEDICARE

## 2023-03-27 VITALS
HEIGHT: 66 IN | SYSTOLIC BLOOD PRESSURE: 100 MMHG | WEIGHT: 146 LBS | DIASTOLIC BLOOD PRESSURE: 60 MMHG | BODY MASS INDEX: 23.46 KG/M2

## 2023-03-27 DIAGNOSIS — R87.610 ASCUS WITH POSITIVE HIGH RISK HPV CERVICAL: Primary | ICD-10-CM

## 2023-03-27 DIAGNOSIS — R87.810 ASCUS WITH POSITIVE HIGH RISK HPV CERVICAL: Primary | ICD-10-CM

## 2023-03-27 RX ORDER — CEFDINIR 300 MG/1
1 CAPSULE ORAL EVERY 12 HOURS SCHEDULED
COMMUNITY
Start: 2023-03-13

## 2023-03-27 NOTE — PROGRESS NOTES
Colposcopy Procedure Note        Procedures    Indications: Nettie Araya is a 48 y.o. female, , whose No LMP recorded (lmp unknown). Patient is perimenopausal..  She presents for follow up for evaluation of an abnormal PAP smear that showed ASCUS with POSITIVE high risk HPV.  She understands the need for the procedure and is aware of the complications, including post-colposcopic vaginal bleeding, vaginal leukorrhea or cervicitis.  She is aware she may experience discomfort.  After being presented with the risk, benefits, and alternatives the patient wished to proceed.      Urine pregnancy test done in the office today was UPT not done patient Is s/p BTL     The patient has not had Gardasil.  She is not a smoker.    Prior cervical treatment: no treatment.    Procedure Details   The risks and benefits of the procedure and Verbal informed consent obtained.    She was positioned in the dorsal lithotomy position and a speculum was inserted into the vagina and excellent visualization of cervix achieved, cervix swabbed x 3 with acetic acid solution. The transformation zone was not completely visualized.  A cervical biopsy was obtained at 9 o'clock.  An endocervical curettage was performed.  This colposcopy was unsatisfactory.     Approximately 90% of SCJ visualized.     Findings:  The procedure was notable for:  Physical Exam  Vitals and nursing note reviewed. Exam conducted with a chaperone present.   Constitutional:       Appearance: Normal appearance. She is well-developed.   HENT:      Head: Normocephalic and atraumatic.   Pulmonary:      Effort: Pulmonary effort is normal.   Genitourinary:     Exam position: Lithotomy position.            Comments: Cervix: acetowhite lesion(s) noted at 9 o'clock; . Nabothian cyst present at 6:00  Skin:     General: Skin is warm and dry.   Neurological:      Mental Status: She is alert and oriented to person, place, and time.   Psychiatric:         Mood and Affect:  Mood normal.         Behavior: Behavior normal.         Specimens: 9:00 and ECC  Specimens labelled and sent to Pathology.    Complications: none.    The patient tolerated the procedure very well and with mild discomfort and bleeding.    Assessment and Plan    Problem List Items Addressed This Visit    None  Visit Diagnoses     ASCUS with positive high risk HPV cervical    -  Primary          1. Will base further treatment on Pathology findings.  2. Treatment options discussed with patient.  3. Post biopsy instructions given to patient.  4. Will contact pt with path and plan    Sherri Gill MD  03/27/2023

## 2023-03-28 DIAGNOSIS — M47.812 CERVICAL SPONDYLOSIS WITHOUT MYELOPATHY: Primary | ICD-10-CM

## 2023-03-29 LAB — REF LAB TEST METHOD: NORMAL

## 2023-04-06 ENCOUNTER — TELEPHONE (OUTPATIENT)
Dept: PAIN MEDICINE | Facility: CLINIC | Age: 48
End: 2023-04-06

## 2023-04-06 NOTE — TELEPHONE ENCOUNTER
Caller: ROSIE JIANG     Relationship to patient: SELF    Best call back number: [phone]    Additional notes:HAS A QUESTION AS TO WHY SHE IS HAVING AN MRI

## 2023-04-07 NOTE — TELEPHONE ENCOUNTER
Sent [place] message to patient with the following,     Corrina Sheffield ordered an MRI in regards to your phone call on 3/27/2023 requesting an MRI for increased pain.   Thank you   [facility].

## 2023-04-11 ENCOUNTER — TELEPHONE (OUTPATIENT)
Dept: OBSTETRICS AND GYNECOLOGY | Facility: CLINIC | Age: 48
End: 2023-04-11
Payer: MEDICARE

## 2023-05-08 ENCOUNTER — HOSPITAL ENCOUNTER (OUTPATIENT)
Dept: MAMMOGRAPHY | Facility: HOSPITAL | Age: 48
Discharge: HOME | End: 2023-05-08
Admitting: OBSTETRICS & GYNECOLOGY
Payer: MEDICARE

## 2023-05-08 DIAGNOSIS — Z12.31 BREAST CANCER SCREENING BY MAMMOGRAM: ICD-10-CM

## 2023-05-08 PROCEDURE — 77067 SCR MAMMO BI INCL CAD: CPT

## 2023-05-08 PROCEDURE — 77067 SCR MAMMO BI INCL CAD: CPT | Performed by: RADIOLOGY

## 2023-05-08 PROCEDURE — 77063 BREAST TOMOSYNTHESIS BI: CPT | Performed by: RADIOLOGY

## 2023-05-08 PROCEDURE — 77063 BREAST TOMOSYNTHESIS BI: CPT

## 2023-05-15 ENCOUNTER — HOSPITAL ENCOUNTER (OUTPATIENT)
Dept: MAMMOGRAPHY | Facility: HOSPITAL | Age: 48
Discharge: HOME | End: 2023-05-15
Admitting: RADIOLOGY
Payer: MEDICARE

## 2023-05-15 DIAGNOSIS — R92.8 ABNORMAL MAMMOGRAM: ICD-10-CM

## 2023-05-15 PROCEDURE — G0279 TOMOSYNTHESIS, MAMMO: HCPCS

## 2023-05-15 PROCEDURE — 77066 DX MAMMO INCL CAD BI: CPT

## 2023-05-15 PROCEDURE — G0279 TOMOSYNTHESIS, MAMMO: HCPCS | Performed by: RADIOLOGY

## 2023-05-16 ENCOUNTER — HOSPITAL ENCOUNTER (OUTPATIENT)
Dept: MRI IMAGING | Facility: HOSPITAL | Age: 48
Discharge: HOME | End: 2023-05-16
Admitting: NURSE PRACTITIONER
Payer: OTHER MISCELLANEOUS

## 2023-05-16 DIAGNOSIS — M47.812 CERVICAL SPONDYLOSIS WITHOUT MYELOPATHY: ICD-10-CM

## 2023-05-16 PROCEDURE — 72156 MRI NECK SPINE W/O & W/DYE: CPT

## 2023-05-16 PROCEDURE — 0 GADOBENATE DIMEGLUMINE 529 MG/ML SOLUTION: Performed by: NURSE PRACTITIONER

## 2023-05-16 PROCEDURE — A9577 INJ MULTIHANCE: HCPCS | Performed by: NURSE PRACTITIONER

## 2023-05-16 RX ADMIN — GADOBENATE DIMEGLUMINE 12 ML: 529 INJECTION, SOLUTION INTRAVENOUS at 16:14

## 2023-05-17 DIAGNOSIS — M47.812 CERVICAL SPONDYLOSIS WITHOUT MYELOPATHY: Primary | ICD-10-CM

## 2023-06-12 ENCOUNTER — DOCUMENTATION (OUTPATIENT)
Dept: PAIN MEDICINE | Facility: CLINIC | Age: 48
End: 2023-06-12
Payer: MEDICARE

## 2023-08-07 RX ORDER — BACLOFEN 10 MG/1
TABLET ORAL
Qty: 120 TABLET | Refills: 5 | Status: SHIPPED | OUTPATIENT
Start: 2023-08-07

## 2023-08-16 ENCOUNTER — OFFICE VISIT (OUTPATIENT)
Dept: PAIN MEDICINE | Facility: CLINIC | Age: 48
End: 2023-08-16
Payer: OTHER MISCELLANEOUS

## 2023-08-16 ENCOUNTER — DOCUMENTATION (OUTPATIENT)
Dept: PAIN MEDICINE | Facility: CLINIC | Age: 48
End: 2023-08-16
Payer: MEDICARE

## 2023-08-16 VITALS — WEIGHT: 145.2 LBS | BODY MASS INDEX: 24.19 KG/M2 | HEIGHT: 65 IN

## 2023-08-16 DIAGNOSIS — F32.A DEPRESSION, UNSPECIFIED DEPRESSION TYPE: ICD-10-CM

## 2023-08-16 DIAGNOSIS — M79.18 MYOFASCIAL PAIN: ICD-10-CM

## 2023-08-16 DIAGNOSIS — G56.81 SUPRASCAPULAR ENTRAPMENT NEUROPATHY OF RIGHT SIDE: ICD-10-CM

## 2023-08-16 DIAGNOSIS — Z46.2 ENCOUNTER FOR FITTING AND ADJUSTMENT OF NEUROPACEMAKER OF SPINAL CORD: ICD-10-CM

## 2023-08-16 DIAGNOSIS — M47.812 CERVICAL SPONDYLOSIS WITHOUT MYELOPATHY: ICD-10-CM

## 2023-08-16 DIAGNOSIS — G90.511 COMPLEX REGIONAL PAIN SYNDROME TYPE 1 OF RIGHT UPPER EXTREMITY: ICD-10-CM

## 2023-08-16 DIAGNOSIS — Z96.82 PRESENCE OF NEUROSTIMULATOR: ICD-10-CM

## 2023-08-16 DIAGNOSIS — M48.02 CERVICAL SPINAL STENOSIS: ICD-10-CM

## 2023-08-16 DIAGNOSIS — R53.81 PHYSICAL DECONDITIONING: ICD-10-CM

## 2023-08-16 RX ORDER — DULOXETIN HYDROCHLORIDE 60 MG/1
60 CAPSULE, DELAYED RELEASE ORAL DAILY
Qty: 90 CAPSULE | Refills: 5 | Status: SHIPPED | OUTPATIENT
Start: 2023-08-16

## 2023-08-16 RX ORDER — NORTRIPTYLINE HYDROCHLORIDE 10 MG/1
10 CAPSULE ORAL NIGHTLY
Qty: 60 CAPSULE | Refills: 5 | Status: SHIPPED | OUTPATIENT
Start: 2023-08-16

## 2023-08-16 RX ORDER — GABAPENTIN 600 MG/1
600 TABLET, FILM COATED ORAL DAILY
Qty: 30 TABLET | Refills: 5 | Status: SHIPPED | OUTPATIENT
Start: 2023-08-16

## 2023-08-16 RX ORDER — ZOLPIDEM TARTRATE 10 MG/1
TABLET ORAL
COMMUNITY

## 2023-08-16 NOTE — PROGRESS NOTES
"Chief Complaint: \"Neck pain.\"       History of Present Illness:   Patient: Ms. Nettie Araya, 48 y.o. female was last seen on 1/4/2023 by me for follow-up evaluation and medication refill. Patient returns to the clinic for evaluation of her chronic pain, possible spinal cord stimulator reprogramming, and medication refill.  She underwent initial implantation of her spinal cord stimulator device on October 16, 2018, with Dr. Jam Pack.  She underwent recent revision of the Medtronic cervical spinal cord stimulator lead with extension to the bilateral C4 laminar space with Dr. Jam Pack on 4/28/2021.  Since revision surgery she is doing very well, and reports significant improvement in her hyperesthesia, hyperalgesia and allodynia in her arms, due to her CRPS which was the reason for her initial placement.  At the last office visit with me, she was continuing to complain of what appeared to be more mechanical/axial neck pain that had increased in severity over time.  Therefore on February 6, 2023 she underwent bilateral cervical medial branch rhizotomies at C4, C5, and C6, unfortunately this has failed to provide her with any significant improvement in her continuing neck pain.  A recent MRI of the cervical spine demonstrates multilevel facet arthritis, and disc osteophyte complex formations.  At C4-C5 there is mild to moderate central spinal stenosis, at C5-C6 and C6-C7 there are disc osteophyte complex formations with severe central spinal stenosis and moderate to severe bilateral neuroforaminal stenosis.  She did undergo neurosurgical consultation on 7/13/2023 with Arthur Cornejo PA-C, he discussed the possibility of getting an opinion with an adult deformity specialist secondary to the high degree of spinal listhesis as well as kyphosis in her cervical spine.  I have also ordered a flexion-extension x-ray to address if there is any instability.   Radiation of pain: The pain does not radiate. She " "has had some instances of parasthesias into her arms more frequently since I last saw her.   Hyperesthesia, hyperalgesia and allodynia in her arms had been significantly improved since revision of her SCS device and reprogramming   Pain intensity today: 5/10 with stimulator \"turned on\"    Average pain intensity last week: 5/10  Pain intensity ranges from: 3/10 to 7/10  Aggravating factors: Pain increases with flexion, rotation, and and especially extension of the cervical spine, with movement of her upper extremities involving her shoulders..   Alleviating factors: Pain decreases with analgesics and rest, and \"my spinal cord stimulator device\".   Associated symptoms:   Patient denies  pain, numbness and weakness in the bilateral upper lower extremities. She has had some instances of parasthesias into her arms more frequently since I last saw her.  Patient denies  any new bladder or bowel problems.   Patient reports difficulties with her balance but denies recent falls.     Pain History:   Ms. Nettie Araya, 44 y.o. female has a long standing history of complex regional pain syndrome type 1 of the right shoulder as a result of a work related injury occurring in September 2009.  She underwent explantation of spinal cord stimulator device inserted by Dr. Kaveh Randle on January 20, 2014 after it was discovered by X-rays in 2016 the lead on the left side of the posterior epidural space had migrated.  The patient had a history of multiple falls which could be the cause of the lead migration. On October 16, 2018 she underwent explantation of percutaneous SCS device followed by implantation of a spinal cord stimulator device with Dr. Jam Pack with [employer]. [employer] Specify SureScan 2x8 MRI paddle (MRI full body in 1.5 Bee) with the top electrodes projecting at the level of the superior endplate of the C2 vertebral level. IPG: [employer] with [employer] SystemsStim (MRI compatible full body). The device was " implanted primarily for treatment of CRPS of the upper extremities.     Review of previous therapies and additional medical records:  Nettie Araya has already failed the following measures, including:   Conservative measures: oral analgesics, opioids, physical therapy, chiropractic therapy and TENs   Interventional measures: SGBs, SCS originally implanted in 1/2014 with [employer] MRI compatible Sure Scan with two leads with the top electrodes projecting at the level of the superior endplate of C3.   04/17/2017: bilateral cervical RFTC  02/06/2023: Bilateral cervical RFA  Surgical measures: SCS implant revision as referenced above  She did undergo neurosurgical consultation on 7/13/2023 with Arthur Cornejo PA-C, he discussed the possibility of getting an opinion with an adult deformity specialist secondary to the high degree of spinal listhesis as well as kyphosis in her cervical spine.    Nettie Aarya presents with significant comorbidities including anxiety and depression,  engaged in treatment., CRPS, and migraine headaches engaged in treatment.   In terms of current analgesics, Nettie Araya takes: Gralise 600 mg at bedtime, nortriptyline 10-20 mg qhs., Diclofenac, tizanidine 2 mg prn. Patient also takes Ambien, Ativan, Cymbalta, and Wellbutrin. Patient denies any side effects from her medications.   I have reviewed the [place] Report is consistent to medication reconciliation.    Global Pain Scale 11-01-  2018 05-22-  2019 11-13-  2019 06-15  2020 02-24  2021 11-16  2021 07-20  2022 08-16  2023     Pain 8 15 17 11 16 11 10 12     Feelings 10 13 17 10 26 19 13 10     Clinical outcomes 15 19 20 13 20 14 13 11     Activities 10 10 11 11 4 17 8 14     GPS Total: 43 57 65 45 66 61 43 47         NECK PAIN DISABILITY INDEX QUESTIONNAIRE  DATE 08-16 2023            Pain intensity  0: No pain  1: Mild  2: Moderate  3: Fairly severe  4: very severe  5: Worst imaginable 3           Personal  Care   0: I can look after myself normally without causing extra pain.  1: I can look after myself normally, but it causes extra pain.  2: It is painful to look after myself and I am slow and careful.  3: I need some help, but manage most of my personal care.  4: I need help every day in most aspects of self-care.  5: I do not get dressed; I wash with difficulty and stay in bed. 3            Lifting  0: I can lift heavy weights without extra pain.  1: I can lift heavy weights, but it gives extra pain.  2: Pain prevents me from lifting heavy weights off the floor but I can manage if they are conveniently positioned, for example, on a table.  3: Pain prevents me from lifting heavy weights, but I can manage light to medium weights if they are conveniently positioned.  4: I can lift very light weights.  5: I cannot lift or carry anything at all. 4            Reading  0: I can read as much as I want to with no pain in my neck.  1: I can read as much as I want to with slight pain in my neck.  2: I can read as much as I want to with moderate pain in my neck.  3: I cannot read as much as I want because of moderate pain in my neck.  4: I cannot read as much as I want because of severe pain in my neck.  5: I cannot read at all. 4            Headaches  0: I have no headaches at all.  1: I have slight headaches which come infrequently.  2: I have moderate headaches which come infrequently.  3: I have moderate headaches which come frequently.  4: I have severe headaches which come frequently.  5: I have headaches almost all the time. 5            Concentration  0: I can concentrate fully when I want to with no difficulty.  1: I can concentrate fully when I want to with slight difficulty.  2: I have a fair degree of difficulty in concentrating when I want to.  3: I have a lot of difficulty in concentrating when I want to.  4: I have a great deal of difficulty in concentrating when I want to.  5: I cannot concentrate at all. 3             Work  0: I can do as much work as I want to.  1: I can only do my usual work, but no more.  2: I can do most of my usual work, but no more.  3: I cannot do my usual work.  4: I can hardly do any work at all.  5: I cannot do any work at all. 5            Driving  0: I can drive my car without any neck pain.  1: I can drive my car as long as I want with slight pain in my neck.  2: I can drive my car as long as I want with moderate pain in my   neck.  3: I cannot drive my car as long as I want because of moderate pain   in my neck.  4: I can hardly drive at all because of severe pain in my neck.  5: I cannot drive my car at all. 4            Sleeping  0: I have no trouble sleeping.  1: My sleep is slightly disturbed (less than 1 hour sleepless).  2: My sleep is mildly disturbed (1-2 hours sleepless).  3: My sleep is moderately disturbed (2-3 hours sleepless).  4: My sleep is greatly disturbed (3-5 hours sleepless).  5: My sleep is completely disturbed (5-7 hours) 5            Recreation  0: I am able to engage in all of my recreational activities with no neck pain at all.  1: I am able to engage in all of my recreational activities with some pain in my neck.  2: I am able to engage in most, but not all of my recreational activities because of pain in my neck.  3: I am able to engage in a few of my recreational activities because of pain in my neck.  4: I can hardly do any recreational activities because of pain in my neck.  5: I cannot do any recreational activities at all. 5           TOTAL SCORE 43             Review of New Diagnostic Studies:  MRI of the cervical spine with and without contrast 5/16/2023: Postoperative changes from prior C4 laminectomy and spinal cord stimulator placement.  There is straightening and reversal of the normal cervical lordosis with minimal retrolisthesis of C5 on C6.  Spinal cord appears normal in caliber and signal.  C2-C3: Disc osteophyte complex with facet arthritis, no  significant spinal canal or neuroforaminal stenosis.  C3-C4: Disc osteophyte complex with facet arthritis with mild central spinal stenosis and bilateral neuroforaminal stenosis.  C4-C5: Disc osteophyte complex with facet arthritis and mild to moderate central spinal stenosis with bilateral neuroforaminal stenosis.  C5-C6: Disc osteophyte complex with facet arthritis with severe central spinal stenosis and bilateral neuroforaminal stenosis which has worsened from previous studies.  C6-C7: Disc osteophyte complex with facet arthritis with severe central spinal stenosis and moderate to severe bilateral neuroforaminal stenosis.    Review of Diagnostic Studies:  Cervical spine x-rays 2/25/2021: I have reviewed the images.  Spinal cord similar leads are projecting posterior to the C2 and C3 lamina in the midline.  Multilevel spondylitic changes are present most advanced at C4-C5 and C5-C6, with minimal anterolisthesis of C4 on C5.  CT of the cervical spine without contrast 3/19/2021: I have reviewed the images.  Vertebral body heights are well-maintained with some straightening of the normal cervical lordosis.  There is minimal retrolisthesis of C5 on C6.  Spinal cord stimulator leads are projecting posterior through the C2-C3 interlaminar space and out of the canal.  There are additionally multilevel spondylitic changes most likely advanced at C5-C6 with disc osteophyte complex formation.  MRI of the cervical spine without contrast 4/8/2021: Images were reviewed.  There is straightening of the normal cervical lordosis, vertebral body heights are well-maintained.  Area of signal void at C2 and C3 due to spinal cord stimulator, with leads outside of the spinal canal.  C2-C3: Bilateral facet arthritis, with mild right neuroforaminal stenosis, and minimal effacement of the thecal sac posteriorly.  C3-C4: Small disc osteophyte complex and bilateral facet arthritis with no additional spinal canal or neuroforaminal  stenosis.  C4-C5: Disc osteophyte complex with bilateral facet arthritis creating mild spinal canal stenosis no significant neuroforaminal stenosis.  C5-C6: Disc osteophyte complex with bilateral facet arthritis and moderate to severe spinal canal stenosis and left neuroforaminal stenosis with mild right neuroforaminal stenosis.  C6-C7: Disc osteophyte complex and bilateral facet arthritis with moderate to severe spinal canal stenosis and mild to moderate bilateral neuroforaminal stenosis.  Right Shoulder MRI 12/2010 revealed small glenohumeral joint effusion, capsulitis, tendinitis. No rotator cuff for labral tear was noted  X-ray of the right wrist on 04/30/2014, revealed linear sclerosis within the distal radial metaphysis consistent with healed fracture. There is irregularity of the ulnar styloid consistent with prior fracture. Ulnar positive variance. Mild soft tissue swelling about the wrist.     Review of Systems   Musculoskeletal:  Positive for back pain, neck pain and neck stiffness.   All other systems reviewed and are negative.      Patient Active Problem List   Diagnosis    Knee pain    Depression    Cervical facet arthropathy/cervical spondylosis without myelopathy    Atrophy, muscle disuse    Myofascial pain    Muscle spasm    Suprascapular entrapment neuropathy of right side    Frozen shoulder    Insomnia    Mild obesity    Chronic tension-type headache, not intractable    Physical deconditioning    Common migraine without aura    Adrenal nodule    Lung nodule, multiple    Encounter for fitting and adjustment of neuropacemaker of spinal cord    Complex regional pain syndrome type 1 of right upper extremity    CPRS 1 (complex regional pain syndrome I) of upper limb    Spinal cord stimulator dysfunction    Cervicalgia       Past Medical History:   Diagnosis Date    Abnormal MRI, shoulder     Right Shoulder MRI 12/2010 revealed small glenohumeral joint effusion, capsulitis, tendinitis. No rotator cuff  for labral tear was noted.     Abnormal x-ray     X-Ray of the right wrist on 2014, revealed linear sclerosis within the distal radial metaphysis consistent with healed fracture. There is irregularity of the ulnar styloid consistent with prior fracture.      Anxiety     Chronic pain disorder ??    Depression     Extremity pain ?    Fractures ??    Headache ??    Headache, tension-type ??    History of constipation     History of insomnia     Seconday to pain    History of migraine headaches     History of transfusion     History of varicose veins     Low back pain     Migraine ??    Neck pain ??    Pain in joint, shoulder region     Raynaud's disease     Reflex sympathetic dystrophy ??         Past Surgical History:   Procedure Laterality Date    BACK SURGERY      SCS implant    CERVICAL LAMINECTOMY DECOMPRESSION POSTERIOR Bilateral 10/16/2018    Procedure: PARTIAL C2, C3, C4 LAMINECTOMY SPINAL CORD STIMULATOR REMOVAL INSERTION OF SPINAL CORD STIMULATOR;  Surgeon: Jam Pack MD;  Location: [facility] OR;  Service: Neurosurgery     SECTION      x 3    COLONOSCOPY      EPIDURAL BLOCK  ?    FRACTURE SURGERY      ORTHOPEDIC SURGERY  ??    OTHER SURGICAL HISTORY      Left Distal Radiolnar Joint Stabilization and Capsulodesis    ROTATOR CUFF REPAIR      Rotator cuff repair followed by three surgeries and manipulation under anesthesia with Dr. Ludwig. Fourth surgery performed by Dr. Graves.  Removal of suture and subacromial decompression with lysis of adhesions 2011.    SPINAL CORD STIMULATOR IMPLANT N/A 10/16/2018    Procedure: REMOVAL AND REPLACEMENT SPINAL CORD STIMULATOR;  Surgeon: Jam Pack MD;  Location: [facility] OR;  Service: Neurosurgery    SPINAL CORD STIMULATOR IMPLANT N/A 2021    Procedure: SPINAL CORD STIMULATOR REVISION CERVICAL;  Surgeon: Jam Pack MD;  Location: [facility] OR;  Service: Neurosurgery;  Laterality: N/A;    SPINE SURGERY  ?    THYROIDECTOMY,  PARTIAL      TUBAL ABDOMINAL LIGATION      WRIST SURGERY           Family History   Problem Relation Age of Onset    Diabetes Mother     Hypertension Mother     Stroke Mother     Lymphoma Father     Lung cancer Father     Brain cancer Father     Liver cancer Father     Cancer Father     Heart disease Maternal Grandmother     COPD Brother     Breast cancer Neg Hx     Ovarian cancer Neg Hx     Uterine cancer Neg Hx     Colon cancer Neg Hx          Social History     Socioeconomic History    Marital status:    Tobacco Use    Smoking status: Never    Smokeless tobacco: Never   Vaping Use    Vaping Use: Never used   Substance and Sexual Activity    Alcohol use: Never    Drug use: Never    Sexual activity: Yes     Partners: Male     Birth control/protection: Pill, Tubal ligation           Current Outpatient Medications:     acyclovir (ZOVIRAX) 400 MG tablet, Take 1 tablet by mouth 2 (Two) Times a Day. for 10 days, Disp: , Rfl:     Aimovig 140 MG/ML prefilled syringe, Inject 1 mL under the skin into the appropriate area as directed Every 30 (Thirty) Days., Disp: , Rfl:     amLODIPine (NORVASC) 2.5 MG tablet, Take 1 tablet by mouth Daily., Disp: , Rfl:     baclofen (LIORESAL) 10 MG tablet, TAKE 1 TABLET BY MOUTH FOUR TIMES DAILY, Disp: 120 tablet, Rfl: 5    buPROPion SR (WELLBUTRIN SR) 150 MG 12 hr tablet, Take 1 tablet by mouth Every 12 (Twelve) Hours., Disp: , Rfl: 0    cefdinir (OMNICEF) 300 MG capsule, Take  by mouth Every 12 (Twelve) Hours., Disp: , Rfl:     chlorthalidone (HYGROTEN) 50 MG tablet, Take 1 tablet by mouth Daily., Disp: , Rfl:     diclofenac (VOLTAREN) 50 MG EC tablet, Take 1 tablet by mouth 2 (Two) Times a Day., Disp: 60 tablet, Rfl: 5    DULoxetine (CYMBALTA) 60 MG capsule, Take 1 capsule by mouth Daily., Disp: 90 capsule, Rfl: 5    estradiol-norethindrone (Activella) 1-0.5 MG per tablet, Take 1 tablet by mouth Daily., Disp: 30 tablet, Rfl: 11    Gabapentin Enacarbil  MG tablet  "controlled-release, , Disp: , Rfl:     Gel Base gel, Apply 1 to 2 grams of pain gel to affected areas three to four times a day., Disp: 240 g, Rfl: PRN    Gralise 600 MG tablet tablet, TAKE 1 TABLET BY MOUTH DAILY, Disp: 30 tablet, Rfl: 5    LORazepam (ATIVAN) 0.5 MG tablet, Take 1 tablet by mouth 3 (Three) Times a Day As Needed., Disp: , Rfl:     nortriptyline (PAMELOR) 10 MG capsule, Take 1 capsule by mouth Every Night. May take 1-2 tablets at night, Disp: 60 capsule, Rfl: 5    omeprazole (priLOSEC) 20 MG capsule, Take 1 capsule by mouth Daily., Disp: , Rfl:     ondansetron (ZOFRAN) 4 MG tablet, Take 1 tablet by mouth Every 8 (Eight) Hours As Needed for Nausea or Vomiting., Disp: , Rfl:     traMADol (ULTRAM) 50 MG tablet, Take 1 tablet by mouth Every 6 (Six) Hours As Needed for Moderate Pain ., Disp: 60 tablet, Rfl: 0    traZODone (DESYREL) 100 MG tablet, Take 1 tablet by mouth every night at bedtime., Disp: , Rfl:     ubrogepant 100 MG tablet, TAKE 1 TABLET BY MOUTH AT ONSET OF HEADACHE, Disp: , Rfl:     zolpidem (AMBIEN) 10 MG tablet, , Disp: , Rfl:     Gabapentin powder, , Disp: , Rfl:       No Known Allergies      Ht 165.1 cm (65\")   Wt 65.9 kg (145 lb 3.2 oz)   BMI 24.16 kg/mý       Physical Exam:  Constitutional: Patient is oriented to person, place, and time.   Patient appears well-developed and well-nourished.   Head: Normocephalic and atraumatic. Eyes: Conjunctivae and lids are normal. Pupils: Equal, round, reactive to light.   Neck: Trachea normal. Neck supple. No JVD present.   Lymphatic: No cervical adenopathy  Pulmonary Respiratory effort: No increased work of breathing or signs of respiratory distress.  Peripheral vascular exam: Normal.   Musculoskeletal   Gait and station: Gait evaluation demonstrated a normal gait   Cervical spine: Passive and active range of motion are significantly limited secondary to pain. Extension, flexion, lateral flexion, rotation of the cervical spine increased and " reproduced pain. Cervical facet joint loading maneuvers are positive.  Muscles: Presence of active trigger points levator scapulae.  Tenderness noted in the bilateral occipital and temporalis regions.  Shoulders: The range of motion of the glenohumeral joints is limited secondary to pain. Rotator cuff strength is 5/5.   Neurological:   Patient is alert and oriented to person, place, and time.   Speech: speech is normal.   Cortical function: Normal mental status.   Cranial nerves: Cranial nerves 2-12 intact.   Reflex Scores:  Right brachioradialis:2+  Left brachioradialis: 2+  Right biceps: 2+  Left biceps: 2+  Right triceps: 2+  Left triceps: 2+  Motor strength: 5/5  Motor Tone: normal tone.   Involuntary movements: none.   Superficial/Primitive Reflexes: primitive reflexes were absent.   Right Downs: absent  Left Downs: absent  Right ankle clonus: absent  Left ankle clonus: absent   Babinsky: absent   Spurling sign is equivocal. Neck tornado test is negative. Lhermitte sign is negative. Negative long tract signs.   Sensation: No sensory loss. Sensory exam: intact to light touch, intact pain and temperature sensation, intact vibration sensation and normal proprioception. There is a reduction in the hyperalgesia, hyperesthesia, and allodynia in the right forearm and right hand from prior exam. Minimal hyperalgesia, hyperesthesia, or allodynia on the left shoulder and left arm.  Coordination: Normal finger to nose and heel to shin. Normal balance and negative Romberg's sign   Skin and subcutaneous tissue: No rash noted. No cyanosis.   Psychiatric: Judgment and insight: Normal. Orientation to person, place and time: Normal. Recent and remote memory: Intact. Mood and affect: Normal.     PROCEDURE: Analysis of the spinal cord stimulator device without complex reprogramming  Analysis of the stimulator reveals that patient used the Medtronic spinal cord stimulator device average 60% of the time. Analysis of impedence  reveals normal impedence for all contacts. The spinal cord simulator device has 3 programs.      Program A1 (Preferred Program)  Electrode polarities: 0+,3-, 8+, 11-  Amplitude: 2.0 mA     Pulse width: 460 mcs  Rate: 340 Hz         ASSESSMENT:   1. Cervical spondylosis without myelopathy    2. Cervical spinal stenosis    3. Complex regional pain syndrome type 1 of right upper extremity    4. Suprascapular entrapment neuropathy of right side    5. Myofascial pain    6. Presence of neurostimulator    7. Encounter for fitting and adjustment of neuropacemaker of spinal cord    8. Physical deconditioning    9. Depression, unspecified depression type          PLAN/MEDICAL DECISION MAKING: Ms. Nettie Araya, 48 y.o. female has a long standing history of complex regional pain syndrome type 1 of the right shoulder as a result of a work related injury occurring in September 2009. She underwent explantation of spinal cord stimulator device inserted by Dr. Kaveh Randle in 2014. She underwent recent revision of the [employer] cervical spinal cord stimulator lead with extension to the bilateral C4 laminar space with Dr. Jam Pack on 4/28/2021.  Since revision surgery she is doing very well, and reports significant improvement in her hyperesthesia, hyperalgesia and allodynia in her arms, due to her CRPS which was the reason for her initial placement.  She does continue with mechanical neck pain, from which she has underwent treatment with bilateral cervical rhizotomies in the past. At the last office visit with me, she was continuing to complain of what appeared to be more mechanical/axial neck pain that had increased in severity over time.  Therefore on February 6, 2023 she underwent bilateral cervical medial branch rhizotomies at C4, C5, and C6, unfortunately this has failed to provide her with any significant improvement in her continuing neck pain.  A recent MRI of the cervical spine demonstrates multilevel facet  arthritis, and disc osteophyte complex formations.  At C4-C5 there is mild to moderate central spinal stenosis, at C5-C6 and C6-C7 there are disc osteophyte complex formations with severe central spinal stenosis and moderate to severe bilateral neuroforaminal stenosis.  She did undergo neurosurgical consultation on 7/13/2023 with Arthur Cornejo PA-C, he discussed the possibility of getting an opinion with an adult deformity specialist secondary to the high degree of spinal listhesis as well as kyphosis in her cervical spine.  Her spinal cord stimulator has provided her with significant improvement in her CRPS symptoms, although she continues with diffuse neck pain, with some vague radicular complaints. She remains in full compliance with medication regimen, SCS device and follow-up evaluations. I had a lengthy conversation with Ms. Ntetie Araya regarding her chronic pain condition and potential therapeutic options including risks, benefits, alternative therapies, to name a few.  I have reviewed all available patient's medical records as well as previous therapies as referenced above.  Therefore, I have proposed the following plan:  1. Follow up PRN for SCS reprogramming and in 6 months for medication refill.  2. Interventional pain management measures: None indicated at this time.  She did undergo neurosurgical consultation on 7/13/2023 with Arthur Cornejo PA-C, he discussed the possibility of getting an opinion with an adult deformity specialist secondary to the high degree of spinal listhesis as well as kyphosis in her cervical spine.  I have also ordered a flexion-extension x-ray to address if there is any instability.  I will send the results of her cervical x-ray to Arthur.  3. Pharmacological measures: Reviewed and discussed. Patient also takes trazodone, Ativan, Cymbalta, Ambien, Aimovig and Wellbutrin. Patient denies any side effects from her medications.   A. Continue Gralise 600 mg with evening  meals.  B. Continue nortriptyline 10-20 mg at bedtime.  C. Continue baclofen 10 mg 4 times daily as needed for muscle spasms.  D. Continue Lidocaine 10%, clonidine 0.1%, baclofen 5%, cyclobenzaprine 4%, amitriptyline 2%, gabapentin 10%, prilocaine 2% gel, apply gel to the right shoulder as needed.  E. Continue Diclofenac 50 mg two times daily only when necessary for breakthrough pain. Patient has been instructed again not to take any other NSAIDs.  F. Random UDS for compliance  4. Long-term rehabilitation efforts:  A. The patient does not have a history of recent falls within the last 3 months. I did complete a risk assessment for falls.   B. Start an exercise program such as water therapy, daily walks for fitness and progressive strength training  5. The patient has been instructed to contact my office with any questions or difficulties. The patient understands the plan and agrees to proceed accordingly.    Nettie Araya reports a pain score of 6.  Given her pain assessment as noted, treatment options were discussed and the following options were decided upon as a follow-up plan to address the patient's pain: continuation of current treatment plan for pain, home exercises and therapy, prescription for non-opiod analgesics, and use of non-medical modalities (ice, heat, stretching and/or behavior modifications).    Pain Medications               baclofen (LIORESAL) 10 MG tablet TAKE 1 TABLET BY MOUTH FOUR TIMES DAILY    buPROPion SR (WELLBUTRIN SR) 150 MG 12 hr tablet Take 1 tablet by mouth Every 12 (Twelve) Hours.    diclofenac (VOLTAREN) 50 MG EC tablet Take 1 tablet by mouth 2 (Two) Times a Day.    DULoxetine (CYMBALTA) 60 MG capsule Take 1 capsule by mouth Daily.    nortriptyline (PAMELOR) 10 MG capsule Take 1 capsule by mouth Every Night. May take 1-2 tablets at night    traMADol (ULTRAM) 50 MG tablet Take 1 tablet by mouth Every 6 (Six) Hours As Needed for Moderate Pain .    traZODone (DESYREL) 100 MG  tablet Take 1 tablet by mouth every night at bedtime.               Patient Care Team:  Bianca Okeefe APRN as PCP - General (Internal Medicine)  Kaveh Randle MD as Consulting Physician (Pain Medicine)  Nettie Garcia DO as Consulting Physician (Neurology)  Jam Pack MD as Consulting Physician (Neurosurgery)  Sherri Gill MD as Gynecologist (Obstetrics and Gynecology)  Corrina Holley APRN as Nurse Practitioner (Nurse Practitioner)     No orders of the defined types were placed in this encounter.        Future Appointments   Date Time Provider Department Center   2/19/2024  1:00 PM Corrina Holley APRN [facility]         [facility] [place]

## 2023-08-16 NOTE — PROGRESS NOTES
Spoke with Shaila VELASCO.  They advised that they sent the wrong kit. They made note to send oral swab for next follow up appointment and any future appointments since we do not have facilities to obtain a urine sample from the patient.

## 2023-08-16 NOTE — PROGRESS NOTES
LVM for [facility] REWorkers' Compensation Specimen Collection for Nettie Araya   Urine specimen cup was sent to office. Our office does not have the ability to collect that for lab purposes. This was confirmed with management.   Requested that they call back and discuss this with office. Direct extension given.     Also called [employer]  [phone]  LVM with the above information as well.

## 2023-08-29 ENCOUNTER — HOSPITAL ENCOUNTER (OUTPATIENT)
Dept: GENERAL RADIOLOGY | Facility: HOSPITAL | Age: 48
Discharge: HOME | End: 2023-08-29
Admitting: NURSE PRACTITIONER
Payer: OTHER MISCELLANEOUS

## 2023-08-29 DIAGNOSIS — M47.812 CERVICAL SPONDYLOSIS WITHOUT MYELOPATHY: ICD-10-CM

## 2023-08-29 PROCEDURE — 72052 X-RAY EXAM NECK SPINE 6/>VWS: CPT

## 2023-09-26 NOTE — TELEPHONE ENCOUNTER
Also I will go ahead and place the order for the MRI but to help with pain control please have her reach out to be reprogrammed. 
Caller: ROSIE    Relationship: SELF    Best call back number: [phone]    What orders are you requesting (i.e. lab or imaging): MRI    In what timeframe would the patient need to come in: ASAP    Where will you receive your lab/imaging services: ANY WHERE THAT CAN GET HER IN THE [place]IC[place]    Additional notes: UPPER EXTREMITY PAIN HAS GOTTEN WORSE SHE WANTS AN MRI SCHD.         
PATIENT STATES HER PAIN IS 9/10 AND IT RADIATING FROM MID/UPPER BACK BELOW HER SHOULDER BLADES SEVERE ON LEFT SIDE MORE THAN RIGHT.  SHARP STABBING PAIN. UNABLE TO GET COMFORTABLE, SITTING OR STANDING VS LAYING OR RECLINING.   ICE OR HEAT, NOTHING HELPING.  WENT TO ED, [[facility]], GIVEN TORADOL AND ITS NOT ANY BETTER.    
m for patient with the information below:  From Corrina RESTREPO: I advise she finds time first to meet with a [employer] rep, at her last visit with me she was not even using her stimulator 30% of the time which could be there reasoning for increased pain. Her workers comp will not approve MRI if she is not complying with stimulator recommendations first.   
,

## 2023-09-27 ENCOUNTER — TELEPHONE (OUTPATIENT)
Dept: PAIN MEDICINE | Facility: CLINIC | Age: 48
End: 2023-09-27
Payer: MEDICARE

## 2023-09-27 NOTE — TELEPHONE ENCOUNTER
LVM for pt advising that Corrina did send the information to Arthur, and that the pt should call [facility] to inquire about her questions with them, and that the XR was from Arthur, and Corrina ordered it, but it's possible he may not have discussed it with Dr. Pack yet, and to call [facility]. Provided their phone number.       Pt called and left a VM on my secure line. Pt advised that she had an XR completed, and she is inquiring the results, and if Corrina has discussed the results with [facility] Cornejo, and she'd like to know what are the next steps? Sending message to Corrina to get further advise.

## 2023-10-30 ENCOUNTER — DOCUMENTATION (OUTPATIENT)
Dept: PAIN MEDICINE | Facility: CLINIC | Age: 48
End: 2023-10-30
Payer: MEDICARE

## 2023-10-30 NOTE — PROGRESS NOTES
Received PA through covermymeds, KEY:[id number]. Received:    Additional Information Required  Drug is covered by current benefit plan. No further PA activity needed

## 2023-12-06 DIAGNOSIS — M47.812 CERVICAL SPONDYLOSIS WITHOUT MYELOPATHY: Primary | ICD-10-CM

## 2024-01-12 DIAGNOSIS — G90.511 COMPLEX REGIONAL PAIN SYNDROME TYPE 1 OF RIGHT UPPER EXTREMITY: ICD-10-CM

## 2024-01-16 DIAGNOSIS — G90.511 COMPLEX REGIONAL PAIN SYNDROME TYPE 1 OF RIGHT UPPER EXTREMITY: ICD-10-CM

## 2024-01-16 DIAGNOSIS — M47.812 CERVICAL SPONDYLOSIS WITHOUT MYELOPATHY: ICD-10-CM

## 2024-01-16 RX ORDER — GABAPENTIN 600 MG/1
600 TABLET, FILM COATED ORAL DAILY
Qty: 30 TABLET | Refills: 5 | OUTPATIENT
Start: 2024-01-16

## 2024-01-16 RX ORDER — NORTRIPTYLINE HYDROCHLORIDE 10 MG/1
10 CAPSULE ORAL NIGHTLY
Qty: 60 CAPSULE | Refills: 5 | OUTPATIENT
Start: 2024-01-16

## 2024-01-16 NOTE — TELEPHONE ENCOUNTER
Caller: Nettie Araya    Relationship: Self    Best call back number: [phone] (home)       Requested Prescriptions:   Requested Prescriptions     Pending Prescriptions Disp Refills    gabapentin, once-daily, (Gralise) 600 MG tablet tablet 30 tablet 5     Sig: Take 1 tablet by mouth Daily.    nortriptyline (PAMELOR) 10 MG capsule 60 capsule 5     Sig: Take 1 capsule by mouth Every Night. May take 1-2 tablets at night    diclofenac (VOLTAREN) 50 MG EC tablet 180 tablet 1     Sig: Take 1 tablet by mouth 2 (Two) Times a Day.        Pharmacy where request should be sent: [id number] - [place], [place] - [address] [address] [address] AT [address] & [facility] - [phone] [facility] [phone]      Last office visit with prescribing clinician: 8/16/2023   Last telemedicine visit with prescribing clinician: Visit date not found   Next office visit with prescribing clinician: 2/20/2024     Additional details provided by patient:     Does the patient have less than a 3 day supply:  [x] Yes  [] No    Would you like a call back once the refill request has been completed: [x] Yes [] No    If the office needs to give you a call back, can they leave a voicemail: [x] Yes [] No    Chico Wood Rep   01/16/24 15:09 EST         DELETE AFTER READING TO PATIENT: “Thank you for sharing this information with me. I will send a message to the clinical team. Please allow 48 hours for the clinical staff to follow up on this request.”

## 2024-01-17 RX ORDER — NORTRIPTYLINE HYDROCHLORIDE 10 MG/1
CAPSULE ORAL
Qty: 60 CAPSULE | Refills: 5 | Status: SHIPPED | OUTPATIENT
Start: 2024-01-17

## 2024-01-17 RX ORDER — GABAPENTIN 600 MG/1
600 TABLET, FILM COATED ORAL DAILY
Qty: 30 TABLET | Refills: 2 | Status: SHIPPED | OUTPATIENT
Start: 2024-01-17

## 2024-01-30 ENCOUNTER — HOSPITAL ENCOUNTER (OUTPATIENT)
Dept: HOSPITAL 22 - RAD | Age: 49
End: 2024-01-30
Payer: MEDICARE

## 2024-01-30 DIAGNOSIS — E27.8: Primary | ICD-10-CM

## 2024-01-30 LAB
BUN SERPL-MCNC: 18 MG/DL (ref 7–17)
CREATININE,SERUM: 1.1 MG/DL (ref 0.52–1.04)
ESTIMATED GLOMERULAR FILT RATE: 53 ML/MIN (ref 60–?)
GFR (AFRICAN AMERICAN): 64 ML/MIN (ref 60–?)

## 2024-01-30 PROCEDURE — 82565 ASSAY OF CREATININE: CPT

## 2024-01-30 PROCEDURE — 84520 ASSAY OF UREA NITROGEN: CPT

## 2024-01-30 PROCEDURE — 36415 COLL VENOUS BLD VENIPUNCTURE: CPT

## 2024-01-30 PROCEDURE — 74178 CT ABD&PLV WO CNTR FLWD CNTR: CPT

## 2024-01-30 RX ADMIN — IOPAMIDOL 75 ML: 755 INJECTION, SOLUTION INTRAVENOUS at 10:47

## 2024-02-02 DIAGNOSIS — N95.1 PERIMENOPAUSAL VASOMOTOR SYMPTOMS: ICD-10-CM

## 2024-02-02 RX ORDER — ESTRADIOL AND NORETHINDRONE ACETATE 1; .5 MG/1; MG/1
1 TABLET ORAL DAILY
Qty: 28 TABLET | Refills: 0 | Status: SHIPPED | OUTPATIENT
Start: 2024-02-02

## 2024-02-08 ENCOUNTER — OFFICE VISIT (OUTPATIENT)
Dept: PAIN MEDICINE | Facility: CLINIC | Age: 49
End: 2024-02-08
Payer: OTHER MISCELLANEOUS

## 2024-02-08 ENCOUNTER — DOCUMENTATION (OUTPATIENT)
Dept: PAIN MEDICINE | Facility: CLINIC | Age: 49
End: 2024-02-08
Payer: MEDICARE

## 2024-02-08 VITALS — WEIGHT: 153 LBS | HEIGHT: 66 IN | BODY MASS INDEX: 24.59 KG/M2

## 2024-02-08 DIAGNOSIS — F32.A DEPRESSION, UNSPECIFIED DEPRESSION TYPE: ICD-10-CM

## 2024-02-08 DIAGNOSIS — R53.81 PHYSICAL DECONDITIONING: ICD-10-CM

## 2024-02-08 DIAGNOSIS — G90.511 COMPLEX REGIONAL PAIN SYNDROME TYPE 1 OF RIGHT UPPER EXTREMITY: ICD-10-CM

## 2024-02-08 DIAGNOSIS — Z96.82 PRESENCE OF NEUROSTIMULATOR: ICD-10-CM

## 2024-02-08 DIAGNOSIS — M47.812 CERVICAL SPONDYLOSIS WITHOUT MYELOPATHY: ICD-10-CM

## 2024-02-08 DIAGNOSIS — G56.81 SUPRASCAPULAR ENTRAPMENT NEUROPATHY OF RIGHT SIDE: ICD-10-CM

## 2024-02-08 DIAGNOSIS — M48.02 CERVICAL SPINAL STENOSIS: ICD-10-CM

## 2024-02-08 DIAGNOSIS — M79.18 MYOFASCIAL PAIN: ICD-10-CM

## 2024-02-08 DIAGNOSIS — Z46.2 ENCOUNTER FOR FITTING AND ADJUSTMENT OF NEUROPACEMAKER OF SPINAL CORD: ICD-10-CM

## 2024-02-08 RX ORDER — DULOXETIN HYDROCHLORIDE 60 MG/1
60 CAPSULE, DELAYED RELEASE ORAL DAILY
Qty: 90 CAPSULE | Refills: 5 | Status: SHIPPED | OUTPATIENT
Start: 2024-02-08

## 2024-02-08 NOTE — PROGRESS NOTES
"Chief Complaint: \"Neck pain.\"       History of Present Illness:   Patient: Ms. Nettie Araya, 49 y.o. female was last seen on 1/4/2023 by me for follow-up evaluation and medication refill. Patient returns to the clinic for evaluation of her chronic pain, possible spinal cord stimulator reprogramming, and medication refill.  She underwent initial implantation of her spinal cord stimulator device on October 16, 2018, with Dr. Jam Pack.  She underwent recent revision of the Medtronic cervical spinal cord stimulator lead with extension to the bilateral C4 laminar space with Dr. Jam Pack on 4/28/2021.  Since revision surgery she is doing very well, and reports significant improvement in her hyperesthesia, hyperalgesia and allodynia in her arms, due to her CRPS which was the reason for her initial placement.  She has continued to complain of what appeared to be more mechanical/axial neck pain that had increased in severity over time.  Therefore on February 6, 2023 she underwent bilateral cervical medial branch rhizotomies at C4, C5, and C6, unfortunately this has failed to provide her with any significant improvement in her continuing neck pain.  A recent MRI of the cervical spine demonstrates multilevel facet arthritis, and disc osteophyte complex formations.  At C4-C5 there is mild to moderate central spinal stenosis, at C5-C6 and C6-C7 there are disc osteophyte complex formations with severe central spinal stenosis and moderate to severe bilateral neuroforaminal stenosis.  She did undergo neurosurgical consultation on 7/13/2023 with Arthur Cornejo PA-C, he discussed the possibility of getting an opinion with an adult deformity specialist secondary to the high degree of spinal listhesis as well as kyphosis in her cervical spine.  Cervical spine x-rays with flexion-extension views demonstrate reversal of the normal cervical lordosis with anterior listhesis of C2 on C3, C3 on C4 and C4 on C5.  " "Multilevel moderate advanced disc disease most significant at C5-C6 and C6-C7.  No instability.  Radiation of pain: The pain does not radiate. She has had some instances of parasthesias into her arms more frequently since I last saw her.   Hyperesthesia, hyperalgesia and allodynia in her arms had been significantly improved since revision of her SCS device and reprogramming   Pain intensity today: 5/10 with stimulator \"turned on\"    Average pain intensity last week: 5/10  Pain intensity ranges from: 3/10 to 7/10  Aggravating factors: Pain increases with flexion, rotation, and and especially extension of the cervical spine, with movement of her upper extremities involving her shoulders..   Alleviating factors: Pain decreases with analgesics and rest, and \"my spinal cord stimulator device\".   Associated symptoms:   Patient denies  pain, numbness and weakness in the bilateral upper lower extremities. She has had some instances of parasthesias into her arms more frequently since I last saw her.  Patient denies  any new bladder or bowel problems.   Patient reports difficulties with her balance but denies recent falls.     Pain History:   Ms. Nettie Araya, 44 y.o. female has a long standing history of complex regional pain syndrome type 1 of the right shoulder as a result of a work related injury occurring in September 2009.  She underwent explantation of spinal cord stimulator device inserted by Dr. Kaveh Randle on January 20, 2014 after it was discovered by X-rays in 2016 the lead on the left side of the posterior epidural space had migrated.  The patient had a history of multiple falls which could be the cause of the lead migration. On October 16, 2018 she underwent explantation of percutaneous SCS device followed by implantation of a spinal cord stimulator device with Dr. Jam Pack with [employer]. [employer] Specify SureScan 2x8 MRI paddle (MRI full body in 1.5 Bee) with the top electrodes projecting at " the level of the superior endplate of the C2 vertebral level. IPG: Medtronic Intellis with AdaptiveStim (MRI compatible full body). The device was implanted primarily for treatment of CRPS of the upper extremities.     Review of previous therapies and additional medical records:  Nettie Araya has already failed the following measures, including:   Conservative measures: oral analgesics, opioids, physical therapy, chiropractic therapy and TENs   Interventional measures: SGBs, SCS originally implanted in 1/2014 with Medtronic RestoreSensor MRI compatible Sure Scan with two leads with the top electrodes projecting at the level of the superior endplate of C3.   04/17/2017: bilateral cervical RFTC  02/06/2023: Bilateral cervical RFA  Surgical measures: SCS implant revision as referenced above  She did undergo neurosurgical consultation on 7/13/2023 with Arthur Cornejo PA-C, he discussed the possibility of getting an opinion with an adult deformity specialist secondary to the high degree of spinal listhesis as well as kyphosis in her cervical spine.    Nettie Araya presents with significant comorbidities including anxiety and depression,  engaged in treatment., CRPS, and migraine headaches engaged in treatment.   In terms of current analgesics, Nettie Araya takes: Gralise 600 mg at bedtime, nortriptyline 10-20 mg qhs., Diclofenac, tizanidine 2 mg prn. Patient also takes Ambien, Ativan, Cymbalta, and Wellbutrin. Patient denies any side effects from her medications.   I have reviewed the [facility] Report is consistent to medication reconciliation.    Global Pain Scale 11-01-  2018 05-22-  2019 11-13-  2019 06-15  2020 02-24  2021 11-16  2021 07-20  2022 08-16 2023 02-8 2024    Pain 8 15 17 11 16 11 10 12 12    Feelings 10 13 17 10 26 19 13 10 11    Clinical outcomes 15 19 20 13 20 14 13 11 15    Activities 10 10 11 11 4 17 8 14 15    GPS Total: 43 57 65 45 66 61 43 47 53        NECK PAIN DISABILITY INDEX  QUESTIONNAIRE  DATE 08-16 2023 02-08 2024          Pain intensity  0: No pain  1: Mild  2: Moderate  3: Fairly severe  4: very severe  5: Worst imaginable 3 3          Personal Care   0: I can look after myself normally without causing extra pain.  1: I can look after myself normally, but it causes extra pain.  2: It is painful to look after myself and I am slow and careful.  3: I need some help, but manage most of my personal care.  4: I need help every day in most aspects of self-care.  5: I do not get dressed; I wash with difficulty and stay in bed. 3  2          Lifting  0: I can lift heavy weights without extra pain.  1: I can lift heavy weights, but it gives extra pain.  2: Pain prevents me from lifting heavy weights off the floor but I can manage if they are conveniently positioned, for example, on a table.  3: Pain prevents me from lifting heavy weights, but I can manage light to medium weights if they are conveniently positioned.  4: I can lift very light weights.  5: I cannot lift or carry anything at all. 4  4          Reading  0: I can read as much as I want to with no pain in my neck.  1: I can read as much as I want to with slight pain in my neck.  2: I can read as much as I want to with moderate pain in my neck.  3: I cannot read as much as I want because of moderate pain in my neck.  4: I cannot read as much as I want because of severe pain in my neck.  5: I cannot read at all. 4  2          Headaches  0: I have no headaches at all.  1: I have slight headaches which come infrequently.  2: I have moderate headaches which come infrequently.  3: I have moderate headaches which come frequently.  4: I have severe headaches which come frequently.  5: I have headaches almost all the time. 5  3          Concentration  0: I can concentrate fully when I want to with no difficulty.  1: I can concentrate fully when I want to with slight difficulty.  2: I have a fair degree of difficulty in concentrating when  I want to.  3: I have a lot of difficulty in concentrating when I want to.  4: I have a great deal of difficulty in concentrating when I want to.  5: I cannot concentrate at all. 3  2          Work  0: I can do as much work as I want to.  1: I can only do my usual work, but no more.  2: I can do most of my usual work, but no more.  3: I cannot do my usual work.  4: I can hardly do any work at all.  5: I cannot do any work at all. 5  4          Driving  0: I can drive my car without any neck pain.  1: I can drive my car as long as I want with slight pain in my neck.  2: I can drive my car as long as I want with moderate pain in my   neck.  3: I cannot drive my car as long as I want because of moderate pain   in my neck.  4: I can hardly drive at all because of severe pain in my neck.  5: I cannot drive my car at all. 4  3          Sleeping  0: I have no trouble sleeping.  1: My sleep is slightly disturbed (less than 1 hour sleepless).  2: My sleep is mildly disturbed (1-2 hours sleepless).  3: My sleep is moderately disturbed (2-3 hours sleepless).  4: My sleep is greatly disturbed (3-5 hours sleepless).  5: My sleep is completely disturbed (5-7 hours) 5  3          Recreation  0: I am able to engage in all of my recreational activities with no neck pain at all.  1: I am able to engage in all of my recreational activities with some pain in my neck.  2: I am able to engage in most, but not all of my recreational activities because of pain in my neck.  3: I am able to engage in a few of my recreational activities because of pain in my neck.  4: I can hardly do any recreational activities because of pain in my neck.  5: I cannot do any recreational activities at all. 5 3          TOTAL SCORE 43 33            Review of New Diagnostic Studies:  Cervical spine x-rays with flexion-extension views demonstrate reversal of the normal cervical lordosis with anterior listhesis of C2 on C3, C3 on C4 and C4 on C5.  Multilevel  moderate advanced disc disease most significant at C5-C6 and C6-C7.  No instability.    Review of Diagnostic Studies:  MRI of the cervical spine with and without contrast 5/16/2023: Postoperative changes from prior C4 laminectomy and spinal cord stimulator placement.  There is straightening and reversal of the normal cervical lordosis with minimal retrolisthesis of C5 on C6.  Spinal cord appears normal in caliber and signal.  C2-C3: Disc osteophyte complex with facet arthritis, no significant spinal canal or neuroforaminal stenosis.  C3-C4: Disc osteophyte complex with facet arthritis with mild central spinal stenosis and bilateral neuroforaminal stenosis.  C4-C5: Disc osteophyte complex with facet arthritis and mild to moderate central spinal stenosis with bilateral neuroforaminal stenosis.  C5-C6: Disc osteophyte complex with facet arthritis with severe central spinal stenosis and bilateral neuroforaminal stenosis which has worsened from previous studies.  C6-C7: Disc osteophyte complex with facet arthritis with severe central spinal stenosis and moderate to severe bilateral neuroforaminal stenosis.  Cervical spine x-rays 2/25/2021: I have reviewed the images.  Spinal cord similar leads are projecting posterior to the C2 and C3 lamina in the midline.  Multilevel spondylitic changes are present most advanced at C4-C5 and C5-C6, with minimal anterolisthesis of C4 on C5.  CT of the cervical spine without contrast 3/19/2021: I have reviewed the images.  Vertebral body heights are well-maintained with some straightening of the normal cervical lordosis.  There is minimal retrolisthesis of C5 on C6.  Spinal cord stimulator leads are projecting posterior through the C2-C3 interlaminar space and out of the canal.  There are additionally multilevel spondylitic changes most likely advanced at C5-C6 with disc osteophyte complex formation.    Review of Systems   Musculoskeletal:  Positive for back pain, myalgias, neck pain and  neck stiffness.   Neurological:  Positive for dizziness, numbness and headaches.   All other systems reviewed and are negative.        Patient Active Problem List   Diagnosis    Knee pain    Depression    Cervical facet arthropathy/cervical spondylosis without myelopathy    Atrophy, muscle disuse    Myofascial pain    Muscle spasm    Suprascapular entrapment neuropathy of right side    Frozen shoulder    Insomnia    Mild obesity    Chronic tension-type headache, not intractable    Physical deconditioning    Common migraine without aura    Adrenal nodule    Lung nodule, multiple    Encounter for fitting and adjustment of neuropacemaker of spinal cord    Complex regional pain syndrome type 1 of right upper extremity    CPRS 1 (complex regional pain syndrome I) of upper limb    Spinal cord stimulator dysfunction    Cervicalgia       Past Medical History:   Diagnosis Date    Abnormal MRI, shoulder     Right Shoulder MRI 12/2010 revealed small glenohumeral joint effusion, capsulitis, tendinitis. No rotator cuff for labral tear was noted.     Abnormal x-ray     X-Ray of the right wrist on 04/30/2014, revealed linear sclerosis within the distal radial metaphysis consistent with healed fracture. There is irregularity of the ulnar styloid consistent with prior fracture.      Anxiety     Chronic pain disorder ??    Depression     Extremity pain ?    Fractures ??    Headache ??    Headache, tension-type ??    History of constipation     History of insomnia     Seconday to pain    History of migraine headaches     History of transfusion     History of varicose veins     Low back pain     Migraine ??    Neck pain ??    Pain in joint, shoulder region     Raynaud's disease     Reflex sympathetic dystrophy ??         Past Surgical History:   Procedure Laterality Date    BACK SURGERY  2014    SCS implant    CERVICAL LAMINECTOMY DECOMPRESSION POSTERIOR Bilateral 10/16/2018    Procedure: PARTIAL C2, C3, C4 LAMINECTOMY SPINAL CORD  STIMULATOR REMOVAL INSERTION OF SPINAL CORD STIMULATOR;  Surgeon: Jam Pack MD;  Location:  [facility];  Service: Neurosurgery     SECTION      x 3    COLONOSCOPY      EPIDURAL BLOCK  ?    FRACTURE SURGERY      ORTHOPEDIC SURGERY  ??    OTHER SURGICAL HISTORY      Left Distal Radiolnar Joint Stabilization and Capsulodesis    ROTATOR CUFF REPAIR      Rotator cuff repair followed by three surgeries and manipulation under anesthesia with Dr. Ludwig. Fourth surgery performed by Dr. Graves.  Removal of suture and subacromial decompression with lysis of adhesions 2011.    SPINAL CORD STIMULATOR IMPLANT N/A 10/16/2018    Procedure: REMOVAL AND REPLACEMENT SPINAL CORD STIMULATOR;  Surgeon: Jam Pack MD;  Location:  [facility];  Service: Neurosurgery    SPINAL CORD STIMULATOR IMPLANT N/A 2021    Procedure: SPINAL CORD STIMULATOR REVISION CERVICAL;  Surgeon: Jam Pack MD;  Location:  [facility];  Service: Neurosurgery;  Laterality: N/A;    SPINE SURGERY  ?    THYROIDECTOMY, PARTIAL      TUBAL ABDOMINAL LIGATION      WRIST SURGERY           Family History   Problem Relation Age of Onset    Diabetes Mother     Hypertension Mother     Stroke Mother     Lymphoma Father     Lung cancer Father     Brain cancer Father     Liver cancer Father     Cancer Father     Heart disease Maternal Grandmother     COPD Brother     Breast cancer Neg Hx     Ovarian cancer Neg Hx     Uterine cancer Neg Hx     Colon cancer Neg Hx          Social History     Socioeconomic History    Marital status:    Tobacco Use    Smoking status: Never    Smokeless tobacco: Never   Vaping Use    Vaping Use: Never used   Substance and Sexual Activity    Alcohol use: Never    Drug use: Never    Sexual activity: Yes     Partners: Male     Birth control/protection: Pill, Tubal ligation           Current Outpatient Medications:     acyclovir (ZOVIRAX) 400 MG tablet, Take 1 tablet by mouth 2 (Two) Times a Day. for 10 days,  "Disp: , Rfl:     Aimovig 140 MG/ML prefilled syringe, Inject 1 mL under the skin into the appropriate area as directed Every 30 (Thirty) Days., Disp: , Rfl:     amLODIPine (NORVASC) 2.5 MG tablet, Take 1 tablet by mouth Daily., Disp: , Rfl:     baclofen (LIORESAL) 10 MG tablet, TAKE 1 TABLET BY MOUTH FOUR TIMES DAILY, Disp: 120 tablet, Rfl: 5    buPROPion SR (WELLBUTRIN SR) 150 MG 12 hr tablet, Take 1 tablet by mouth Every 12 (Twelve) Hours., Disp: , Rfl: 0    chlorthalidone (HYGROTEN) 50 MG tablet, Take 1 tablet by mouth Daily., Disp: , Rfl:     diclofenac (VOLTAREN) 50 MG EC tablet, Take 1 tablet by mouth 2 (Two) Times a Day., Disp: 180 tablet, Rfl: 1    DULoxetine (CYMBALTA) 60 MG capsule, Take 1 capsule by mouth Daily., Disp: 90 capsule, Rfl: 5    estradiol-norethindrone (ACTIVELLA) 1-0.5 MG per tablet, Take 1 tablet by mouth once daily, Disp: 28 tablet, Rfl: 0    Gabapentin Enacarbil  MG tablet controlled-release, , Disp: , Rfl:     gabapentin, once-daily, (Gralise) 600 MG tablet tablet, TAKE 1 TABLET BY MOUTH DAILY, Disp: 30 tablet, Rfl: 2    Gel Base gel, Apply 1 to 2 grams of pain gel to affected areas three to four times a day., Disp: 240 g, Rfl: PRN    nortriptyline (PAMELOR) 10 MG capsule, TAKE 1 CAPSULE BY MOUTH EVERY NIGHT. MAY TAKE 1 TO 2 CAPSULES AT NIGHT, Disp: 60 capsule, Rfl: 5    omeprazole (priLOSEC) 20 MG capsule, Take 1 capsule by mouth Daily., Disp: , Rfl:     ondansetron (ZOFRAN) 4 MG tablet, Take 1 tablet by mouth Every 8 (Eight) Hours As Needed for Nausea or Vomiting., Disp: , Rfl:     traZODone (DESYREL) 100 MG tablet, Take 1 tablet by mouth every night at bedtime., Disp: , Rfl:     ubrogepant 100 MG tablet, TAKE 1 TABLET BY MOUTH AT ONSET OF HEADACHE, Disp: , Rfl:       No Known Allergies      Ht 167.6 cm (66\")   Wt 69.4 kg (153 lb)   BMI 24.69 kg/m²       Physical Exam:  Constitutional: Patient is oriented to person, place, and time.   Patient appears well-developed and " well-nourished.   Head: Normocephalic and atraumatic. Eyes: Conjunctivae and lids are normal. Pupils: Equal, round, reactive to light.   Neck: Trachea normal. Neck supple. No JVD present.   Lymphatic: No cervical adenopathy  Pulmonary Respiratory effort: No increased work of breathing or signs of respiratory distress.  Peripheral vascular exam: Normal.   Musculoskeletal   Gait and station: Gait evaluation demonstrated a normal gait   Cervical spine: Passive and active range of motion are significantly limited secondary to pain. Extension, flexion, lateral flexion, rotation of the cervical spine increased and reproduced pain. Cervical facet joint loading maneuvers are positive.  Muscles: Presence of active trigger points levator scapulae.  Tenderness noted in the bilateral occipital and temporalis regions.  Shoulders: The range of motion of the glenohumeral joints is limited secondary to pain. Rotator cuff strength is 5/5.   Neurological:   Patient is alert and oriented to person, place, and time.   Speech: speech is normal.   Cortical function: Normal mental status.   Cranial nerves: Cranial nerves 2-12 intact.   Reflex Scores:  Right brachioradialis:2+  Left brachioradialis: 2+  Right biceps: 2+  Left biceps: 2+  Right triceps: 2+  Left triceps: 2+  Motor strength: 5/5  Motor Tone: normal tone.   Involuntary movements: none.   Superficial/Primitive Reflexes: primitive reflexes were absent.   Right Downs: absent  Left Downs: absent  Right ankle clonus: absent  Left ankle clonus: absent   Babinsky: absent   Spurling sign is equivocal. Neck tornado test is negative. Lhermitte sign is negative. Negative long tract signs.   Sensation: No sensory loss. Sensory exam: intact to light touch, intact pain and temperature sensation, intact vibration sensation and normal proprioception. There is a reduction in the hyperalgesia, hyperesthesia, and allodynia in the right forearm and right hand from prior exam. Minimal  hyperalgesia, hyperesthesia, or allodynia on the left shoulder and left arm.  Coordination: Normal finger to nose and heel to shin. Normal balance and negative Romberg's sign   Skin and subcutaneous tissue: No rash noted. No cyanosis.   Psychiatric: Judgment and insight: Normal. Orientation to person, place and time: Normal. Recent and remote memory: Intact. Mood and affect: Normal.     PROCEDURE: Analysis of the spinal cord stimulator device without complex reprogramming  Analysis of the stimulator reveals that patient used the Medtronic spinal cord stimulator device average 60% of the time. Analysis of impedence reveals normal impedence for all contacts. The spinal cord simulator device has 3 programs.      Program A1 (Preferred Program)  Electrode polarities: 0+,3-, 8+, 11-  Amplitude: 2.0 mA     Pulse width: 460 mcs  Rate: 340 Hz         ASSESSMENT:   1. Complex regional pain syndrome type 1 of right upper extremity    2. Cervical spondylosis without myelopathy    3. Cervical spinal stenosis    4. Suprascapular entrapment neuropathy of right side    5. Myofascial pain    6. Physical deconditioning    7. Depression, unspecified depression type    8. Presence of neurostimulator    9. Encounter for fitting and adjustment of neuropacemaker of spinal cord            PLAN/MEDICAL DECISION MAKING: Ms. Nettie Araya, 49 y.o. female has a long standing history of complex regional pain syndrome type 1 of the right shoulder as a result of a work related injury occurring in September 2009. She underwent explantation of spinal cord stimulator device inserted by Dr. Kaveh Randle in 2014. She underwent recent revision of the Medtronic cervical spinal cord stimulator lead with extension to the bilateral C4 laminar space with Dr. Jam Pack on 4/28/2021.  Since revision surgery she is doing very well, and reports significant improvement in her hyperesthesia, hyperalgesia and allodynia in her arms, due to her CRPS  which was the reason for her initial placement.  She does continue with mechanical neck pain, from which she has underwent treatment with bilateral cervical rhizotomies in the past.  Therefore on February 6, 2023 she underwent bilateral cervical medial branch rhizotomies at C4, C5, and C6, unfortunately this has failed to provide her with any significant improvement in her continuing neck pain.  A recent MRI of the cervical spine demonstrates multilevel facet arthritis, and disc osteophyte complex formations.  At C4-C5 there is mild to moderate central spinal stenosis, at C5-C6 and C6-C7 there are disc osteophyte complex formations with severe central spinal stenosis and moderate to severe bilateral neuroforaminal stenosis.  She did undergo neurosurgical consultation on 7/13/2023 with Arthur Cornejo PA-C, he discussed the possibility of getting an opinion with an adult deformity specialist secondary to the high degree of spinal listhesis as well as kyphosis in her cervical spine.  Her spinal cord stimulator has provided her with significant improvement in her CRPS symptoms, although she continues with diffuse neck pain, with some vague radicular complaints. She remains in full compliance with medication regimen, SCS device and follow-up evaluations.  I have Spoken with Arthur, Dr. Pack has requested a CT of the cervical spine to be ordered, for further evaluation.  I had a lengthy conversation with Ms. Nettie Araya regarding her chronic pain condition and potential therapeutic options including risks, benefits, alternative therapies, to name a few.  I have reviewed all available patient's medical records as well as previous therapies as referenced above.  Therefore, I have proposed the following plan:  1. Follow up PRN for SCS reprogramming and in 6 months for medication refill.  2. Interventional pain management measures: None indicated at this time.  She did undergo neurosurgical consultation on 7/13/2023  with Arthur Cornejo PA-C, he discussed the possibility of getting an opinion with an adult deformity specialist secondary to the high degree of spinal listhesis as well as kyphosis in her cervical spine.  I have also ordered a CT of the cervical spine for further evaluation.  3. Pharmacological measures: Reviewed and discussed. Patient also takes trazodone, Ativan, Cymbalta, Ambien, Aimovig and Wellbutrin. Patient denies any side effects from her medications.   A. Continue Gralise 600 mg with evening meals.  B. Continue nortriptyline 10-20 mg at bedtime.  C. Continue baclofen 10 mg 4 times daily as needed for muscle spasms.  D. Continue Lidocaine 10%, clonidine 0.1%, baclofen 5%, cyclobenzaprine 4%, amitriptyline 2%, gabapentin 10%, prilocaine 2% gel, apply gel to the right shoulder as needed.  E. Continue Diclofenac 50 mg two times daily only when necessary for breakthrough pain. Patient has been instructed again not to take any other NSAIDs.  F. Random UDS for compliance  4. Long-term rehabilitation efforts:  A. The patient does not have a history of recent falls within the last 3 months. I did complete a risk assessment for falls.   B. Start an exercise program such as water therapy, daily walks for fitness and progressive strength training  5. The patient has been instructed to contact my office with any questions or difficulties. The patient understands the plan and agrees to proceed accordingly.    Nettie Araya reports a pain score of 5.  Given her pain assessment as noted, treatment options were discussed and the following options were decided upon as a follow-up plan to address the patient's pain: continuation of current treatment plan for pain, home exercises and therapy, prescription for non-opiod analgesics, and use of non-medical modalities (ice, heat, stretching and/or behavior modifications).    Pain Medications               baclofen (LIORESAL) 10 MG tablet TAKE 1 TABLET BY MOUTH FOUR TIMES  DAILY    buPROPion SR (WELLBUTRIN SR) 150 MG 12 hr tablet Take 1 tablet by mouth Every 12 (Twelve) Hours.    diclofenac (VOLTAREN) 50 MG EC tablet Take 1 tablet by mouth 2 (Two) Times a Day.    DULoxetine (CYMBALTA) 60 MG capsule Take 1 capsule by mouth Daily.    nortriptyline (PAMELOR) 10 MG capsule TAKE 1 CAPSULE BY MOUTH EVERY NIGHT. MAY TAKE 1 TO 2 CAPSULES AT NIGHT    traZODone (DESYREL) 100 MG tablet Take 1 tablet by mouth every night at bedtime.               Patient Care Team:  Bianca Okeefe APRN as PCP - General (Internal Medicine)  Kaveh Randle MD as Consulting Physician (Pain Medicine)  Nettie Garcia DO as Consulting Physician (Neurology)  Jam Pack MD as Consulting Physician (Neurosurgery)  Sherri Gill MD as Gynecologist (Obstetrics and Gynecology)  Corrina Holley APRN as Nurse Practitioner (Nurse Practitioner)     New Medications Ordered This Visit   Medications    DULoxetine (CYMBALTA) 60 MG capsule     Sig: Take 1 capsule by mouth Daily.     Dispense:  90 capsule     Refill:  5         Future Appointments   Date Time Provider Department Center   8/8/2024  1:30 PM Corrina Holley APRN [facility]         LAURO Oakley

## 2024-02-08 NOTE — PROGRESS NOTES
Oral drug screening kit completed with patient at 1330.   Called [facility] at [phone] to schedule UPS pickup. Spoke with Meena. Confirmed address and hours. UPS to be dispatched for pickup today or tomorrow.

## 2024-02-19 ENCOUNTER — DOCUMENTATION (OUTPATIENT)
Dept: PAIN MEDICINE | Facility: CLINIC | Age: 49
End: 2024-02-19
Payer: MEDICARE

## 2024-02-19 NOTE — PROGRESS NOTES
Received PA for Gralise with Key:[id number]. Submitted to [employer]:THIS MEMBER HAS WORKERS COMPENSATION COVERAGE, [employer] DOES NOT MANAGE PA FOR THESE CLIENTS      Will further research this.

## 2024-02-29 ENCOUNTER — HOSPITAL ENCOUNTER (OUTPATIENT)
Dept: CT IMAGING | Facility: HOSPITAL | Age: 49
Discharge: HOME | End: 2024-02-29
Admitting: NURSE PRACTITIONER
Payer: OTHER MISCELLANEOUS

## 2024-02-29 PROCEDURE — 72125 CT NECK SPINE W/O DYE: CPT

## 2024-03-06 DIAGNOSIS — N95.1 PERIMENOPAUSAL VASOMOTOR SYMPTOMS: ICD-10-CM

## 2024-03-06 RX ORDER — ESTRADIOL AND NORETHINDRONE ACETATE 1; .5 MG/1; MG/1
1 TABLET ORAL DAILY
Qty: 28 TABLET | Refills: 0 | Status: SHIPPED | OUTPATIENT
Start: 2024-03-06

## 2024-03-14 DIAGNOSIS — G90.511 COMPLEX REGIONAL PAIN SYNDROME TYPE 1 OF RIGHT UPPER EXTREMITY: ICD-10-CM

## 2024-03-15 RX ORDER — DULOXETIN HYDROCHLORIDE 60 MG/1
60 CAPSULE, DELAYED RELEASE ORAL DAILY
Qty: 90 CAPSULE | Refills: 5 | OUTPATIENT
Start: 2024-03-15

## 2024-03-15 NOTE — TELEPHONE ENCOUNTER
Patient is inquiring about her CT Cervical Spine Without Contrast (02/29/2024 12:27) results. She is requesting that Corrina review results with Dr. Pakc.

## 2024-03-15 NOTE — TELEPHONE ENCOUNTER
S/w patient and told her she should have refills remaining at [facility] in [facility]. Patient states that she misunderstood and will pick-up refill at [facility]. Refused duplicate refill attached to encounter. Patient will call with any questions or concerns regarding prescriptions.

## 2024-03-18 ENCOUNTER — TELEPHONE (OUTPATIENT)
Dept: PAIN MEDICINE | Facility: CLINIC | Age: 49
End: 2024-03-18
Payer: MEDICARE

## 2024-03-18 NOTE — TELEPHONE ENCOUNTER
Patient called because she said she wants to know the results of her recent CT scan. What would you like for us to pass along to the patient?

## 2024-03-26 ENCOUNTER — OFFICE VISIT (OUTPATIENT)
Dept: ENDOCRINOLOGY | Facility: CLINIC | Age: 49
End: 2024-03-26
Payer: MEDICARE

## 2024-03-26 VITALS
WEIGHT: 158 LBS | HEIGHT: 66 IN | HEART RATE: 74 BPM | BODY MASS INDEX: 25.39 KG/M2 | SYSTOLIC BLOOD PRESSURE: 110 MMHG | OXYGEN SATURATION: 97 % | DIASTOLIC BLOOD PRESSURE: 60 MMHG

## 2024-03-26 DIAGNOSIS — E27.9 LESION OF ADRENAL GLAND: Primary | ICD-10-CM

## 2024-03-26 LAB
ANION GAP SERPL CALCULATED.3IONS-SCNC: 9.9 MMOL/L (ref 5–15)
BUN SERPL-MCNC: 17 MG/DL (ref 6–20)
BUN/CREAT SERPL: 14.7 (ref 7–25)
CALCIUM SPEC-SCNC: 9.2 MG/DL (ref 8.6–10.5)
CHLORIDE SERPL-SCNC: 97 MMOL/L (ref 98–107)
CO2 SERPL-SCNC: 30.1 MMOL/L (ref 22–29)
CREAT SERPL-MCNC: 1.16 MG/DL (ref 0.57–1)
EGFRCR SERPLBLD CKD-EPI 2021: 57.6 ML/MIN/1.73
GLUCOSE SERPL-MCNC: 88 MG/DL (ref 65–99)
POTASSIUM SERPL-SCNC: 3.1 MMOL/L (ref 3.5–5.2)
SODIUM SERPL-SCNC: 137 MMOL/L (ref 136–145)

## 2024-03-26 PROCEDURE — 80048 BASIC METABOLIC PNL TOTAL CA: CPT | Performed by: INTERNAL MEDICINE

## 2024-03-26 PROCEDURE — 36415 COLL VENOUS BLD VENIPUNCTURE: CPT | Performed by: INTERNAL MEDICINE

## 2024-03-26 PROCEDURE — 84244 ASSAY OF RENIN: CPT | Performed by: INTERNAL MEDICINE

## 2024-03-26 PROCEDURE — 99203 OFFICE O/P NEW LOW 30 MIN: CPT | Performed by: INTERNAL MEDICINE

## 2024-03-26 PROCEDURE — 83835 ASSAY OF METANEPHRINES: CPT | Performed by: INTERNAL MEDICINE

## 2024-03-26 PROCEDURE — 82088 ASSAY OF ALDOSTERONE: CPT | Performed by: INTERNAL MEDICINE

## 2024-03-26 RX ORDER — DEXAMETHASONE 1 MG
1 TABLET ORAL ONCE
Qty: 1 TABLET | Refills: 0 | Status: SHIPPED | OUTPATIENT
Start: 2024-03-26 | End: 2024-03-26

## 2024-03-26 NOTE — PROGRESS NOTES
Chief Complaint   Patient presents with    Adrenal Problem        New patient who is being seen in consultation regarding adrenal problem at the request of Nhi Plascencia APRN HPI   Nettie Araya is a 49 y.o. female who presents for evaluation of adrenal problem.    Patient reports that adrenal lesion was initially noted incidentally on imaging a few years ago.  She has had follow-up CT scans but has never undergone any hormonal evaluation.  She does report that her PCP mention potentially sending her to an endocrine surgeon but she has not seen any endocrine surgeon.  She reports no recent weight changes.  She denies any history of hypertension.  She has not had any recent steroid exposure.  She is taking Activella for mild vasomotor symptoms.    Past Medical History:   Diagnosis Date    Abnormal MRI, shoulder     Right Shoulder MRI 12/2010 revealed small glenohumeral joint effusion, capsulitis, tendinitis. No rotator cuff for labral tear was noted.     Abnormal x-ray     X-Ray of the right wrist on 04/30/2014, revealed linear sclerosis within the distal radial metaphysis consistent with healed fracture. There is irregularity of the ulnar styloid consistent with prior fracture.      Anxiety     Chronic pain disorder ??    Depression     Extremity pain ?    Fractures ??    Headache ??    Headache, tension-type ??    History of constipation     History of insomnia     Seconday to pain    History of migraine headaches     History of transfusion     History of varicose veins     Low back pain     Migraine ??    Neck pain ??    Pain in joint, shoulder region     Raynaud's disease     Reflex sympathetic dystrophy ??     Past Surgical History:   Procedure Laterality Date    BACK SURGERY  2014    SCS implant    CERVICAL LAMINECTOMY DECOMPRESSION POSTERIOR Bilateral 10/16/2018    Procedure: PARTIAL C2, C3, C4 LAMINECTOMY SPINAL CORD STIMULATOR REMOVAL INSERTION OF SPINAL CORD STIMULATOR;  Surgeon: Ramone  Jam MOSLEY MD;  Location:  [facility];  Service: Neurosurgery     SECTION      x 3    COLONOSCOPY      EPIDURAL BLOCK  ?    FRACTURE SURGERY      ORTHOPEDIC SURGERY  ??    OTHER SURGICAL HISTORY      Left Distal Radiolnar Joint Stabilization and Capsulodesis    ROTATOR CUFF REPAIR      Rotator cuff repair followed by three surgeries and manipulation under anesthesia with Dr. Ludwig. Fourth surgery performed by Dr. Graves.  Removal of suture and subacromial decompression with lysis of adhesions 2011.    SPINAL CORD STIMULATOR IMPLANT N/A 10/16/2018    Procedure: REMOVAL AND REPLACEMENT SPINAL CORD STIMULATOR;  Surgeon: Jam Pack MD;  Location:  [facility];  Service: Neurosurgery    SPINAL CORD STIMULATOR IMPLANT N/A 2021    Procedure: SPINAL CORD STIMULATOR REVISION CERVICAL;  Surgeon: Jam Pack MD;  Location: [facility];  Service: Neurosurgery;  Laterality: N/A;    SPINE SURGERY  ?    THYROIDECTOMY, PARTIAL      TUBAL ABDOMINAL LIGATION      WRIST SURGERY        Family History   Problem Relation Age of Onset    Diabetes Mother     Hypertension Mother     Stroke Mother     Lymphoma Father     Lung cancer Father     Brain cancer Father     Liver cancer Father     Cancer Father     COPD Brother     Multiple sclerosis Sister     No Known Problems Daughter     No Known Problems Daughter     No Known Problems Daughter     Breast cancer Neg Hx     Ovarian cancer Neg Hx     Uterine cancer Neg Hx     Colon cancer Neg Hx       Social History     Socioeconomic History    Marital status:    Tobacco Use    Smoking status: Never    Smokeless tobacco: Never   Vaping Use    Vaping status: Never Used   Substance and Sexual Activity    Alcohol use: Never    Drug use: Never    Sexual activity: Yes     Partners: Male     Birth control/protection: Pill, Tubal ligation      No Known Allergies   Current Outpatient Medications on File Prior to Visit   Medication Sig Dispense Refill    acyclovir  (ZOVIRAX) 400 MG tablet Take 1 tablet by mouth 2 (Two) Times a Day. for 10 days      Aimovig 140 MG/ML prefilled syringe Inject 1 mL under the skin into the appropriate area as directed Every 30 (Thirty) Days.      amLODIPine (NORVASC) 2.5 MG tablet Take 1 tablet by mouth Daily.      baclofen (LIORESAL) 10 MG tablet TAKE 1 TABLET BY MOUTH FOUR TIMES DAILY 120 tablet 5    buPROPion SR (WELLBUTRIN SR) 150 MG 12 hr tablet Take 1 tablet by mouth Every 12 (Twelve) Hours.  0    chlorthalidone (HYGROTEN) 50 MG tablet Take 1 tablet by mouth Daily.      diclofenac (VOLTAREN) 50 MG EC tablet Take 1 tablet by mouth 2 (Two) Times a Day. 180 tablet 1    DULoxetine (CYMBALTA) 60 MG capsule Take 1 capsule by mouth Daily. 90 capsule 5    estradiol-norethindrone (ACTIVELLA) 1-0.5 MG per tablet Take 1 tablet by mouth once daily 28 tablet 0    Gabapentin Enacarbil  MG tablet controlled-release       gabapentin, once-daily, (Gralise) 600 MG tablet tablet TAKE 1 TABLET BY MOUTH DAILY 30 tablet 2    Gel Base gel Apply 1 to 2 grams of pain gel to affected areas three to four times a day. 240 g PRN    nortriptyline (PAMELOR) 10 MG capsule TAKE 1 CAPSULE BY MOUTH EVERY NIGHT. MAY TAKE 1 TO 2 CAPSULES AT NIGHT 60 capsule 5    omeprazole (priLOSEC) 20 MG capsule Take 1 capsule by mouth Daily.      ondansetron (ZOFRAN) 4 MG tablet Take 1 tablet by mouth Every 8 (Eight) Hours As Needed for Nausea or Vomiting.      traZODone (DESYREL) 100 MG tablet Take 1 tablet by mouth every night at bedtime.      ubrogepant 100 MG tablet TAKE 1 TABLET BY MOUTH AT ONSET OF HEADACHE       No current facility-administered medications on file prior to visit.        Review of Systems   Endocrine: Positive for polyuria.   Genitourinary:  Positive for frequency.   Musculoskeletal:  Positive for arthralgias, back pain, myalgias, neck pain and neck stiffness.   Neurological:  Positive for syncope, light-headedness and headache.   Psychiatric/Behavioral:   "Positive for agitation, decreased concentration and sleep disturbance. The patient is nervous/anxious.       Vitals:    03/26/24 1423   BP: 110/60   BP Location: Right arm   Patient Position: Sitting   Cuff Size: Adult   Pulse: 74   SpO2: 97%   Weight: 71.7 kg (158 lb)   Height: 167.6 cm (66\")   Body mass index is 25.5 kg/m².     Physical Exam  Vitals reviewed.   Constitutional:       General: She is not in acute distress.  Cardiovascular:      Rate and Rhythm: Normal rate and regular rhythm.   Pulmonary:      Effort: Pulmonary effort is normal.      Breath sounds: Normal breath sounds.   Neurological:      General: No focal deficit present.      Mental Status: She is alert.   Psychiatric:         Mood and Affect: Mood and affect normal.         Behavior: Behavior is cooperative.        Labs/Imaging    CT abdomen and pelvis with and without contrast dated 1/30/2024  Right adrenal lesion measures 38 x 21 mm.  Previously measured 35 x 27 mm on imaging from October 2022.  Early-phase contrast 69 Hounsfield units with partial washout.    CT abdomen with and without contrast dated 10/5/2022  3.5 cm heterogenous mass in the right adrenal gland with contrast enhancement, reported as stable in size and appearance compared to prior imaging in March 2022 and September 2020    CT abdomen with and without contrast dated 3/24/2022  3.6 x 2.6 cm right adrenal mass.,  Reported as stable in size from previous exam in March 2021    Assessment and Plan    Diagnoses and all orders for this visit:    1. Lesion of adrenal gland (Primary)  -     Basic Metabolic Panel; Future  -     Metanephrines, Frac. Free, Plasma; Future  -     Aldosterone / Renin Ratio; Future  -     Cortisol - AM; Future  -     Dexamethasone Level, Serum; Future  Right adrenal lesion noted incidentally on imaging.  This has been monitored with serial imaging, available reports reviewed dating back to March 2022.  Lesion is minimally changed in size over the past 2 " years.    Discussed general monitoring of adrenal lesions.  Patient has not had any prior evaluation of hormonal status of lesion.  We discussed size threshold for surgical evaluation even in the absence of hormonal overproduction.  I did discuss with patient that she is on multiple medications which could impact hormonal testing.  She does report that her vasomotor symptoms are mild and is comfortable holding Activella for 1 month prior to completion of dexamethasone suppression test.  She was given orders to complete this locally.  She is also on multiple medication which could cause false positive of serum metanephrines.  She was advised of potential need for additional testing if screen is positive but would prefer to do initial screening without discontinuing medications, if able.  Determine next steps after review of labs.    Patient was given orders for labs to be completed outside of [facility]. Patient instructed to contact clinic if they have not heard from this office regarding results within 1 week of lab draw to ensure that results were received by this office.   Other orders  -     dexAMETHasone (DECADRON) 1 MG tablet; Take 1 tablet by mouth 1 (One) Time for 1 dose. Take at 11 PM night before lab draw at 8 AM  Dispense: 1 tablet; Refill: 0         Return in about 6 months (around 9/26/2024) for Next scheduled follow up. The patient was instructed to contact the clinic with any interval questions or concerns.    Electronically signed by: Iwona Leblanc MD     Dictated Utilizing Dragon Dictation

## 2024-03-27 ENCOUNTER — TELEPHONE (OUTPATIENT)
Dept: PAIN MEDICINE | Facility: CLINIC | Age: 49
End: 2024-03-27
Payer: MEDICARE

## 2024-03-27 ENCOUNTER — TELEPHONE (OUTPATIENT)
Dept: NEUROSURGERY | Facility: CLINIC | Age: 49
End: 2024-03-27
Payer: MEDICARE

## 2024-03-27 NOTE — TELEPHONE ENCOUNTER
I spoke with patient and let her know that Arthur with  requested her imaging of her CT scan and that she should call [facility] for her CT result.

## 2024-03-27 NOTE — TELEPHONE ENCOUNTER
I spoke to PT today about scheduling a F/U with Arthur for failed PM,I have left a message for her  Vera to get authorization for the appointment,I will notify the PT once I get the auth.and get her scheduled.

## 2024-03-28 ENCOUNTER — TELEPHONE (OUTPATIENT)
Dept: ENDOCRINOLOGY | Facility: CLINIC | Age: 49
End: 2024-03-28
Payer: MEDICARE

## 2024-04-01 LAB
METANEPH FREE SERPL-MCNC: <25 PG/ML (ref 0–88)
NORMETANEPHRINE SERPL-MCNC: 57.9 PG/ML (ref 0–218.9)

## 2024-04-03 ENCOUNTER — TELEPHONE (OUTPATIENT)
Dept: ENDOCRINOLOGY | Facility: CLINIC | Age: 49
End: 2024-04-03
Payer: MEDICARE

## 2024-04-03 LAB
ALDOST SERPL-MCNC: 5.9 NG/DL (ref 0–30)
ALDOST/RENIN PLAS-RTO: 0.4 {RATIO} (ref 0–30)
RENIN PLAS-CCNC: 15.17 NG/ML/HR (ref 0.17–5.38)

## 2024-04-07 DIAGNOSIS — N95.1 PERIMENOPAUSAL VASOMOTOR SYMPTOMS: ICD-10-CM

## 2024-04-08 RX ORDER — ESTRADIOL AND NORETHINDRONE ACETATE 1; .5 MG/1; MG/1
1 TABLET ORAL DAILY
Qty: 28 TABLET | Refills: 0 | Status: SHIPPED | OUTPATIENT
Start: 2024-04-08

## 2024-04-18 ENCOUNTER — LAB (OUTPATIENT)
Dept: ENDOCRINOLOGY | Facility: CLINIC | Age: 49
End: 2024-04-18
Payer: MEDICARE

## 2024-04-18 DIAGNOSIS — E27.9 LESION OF ADRENAL GLAND: ICD-10-CM

## 2024-04-18 LAB
ANION GAP SERPL CALCULATED.3IONS-SCNC: 10 MMOL/L (ref 5–15)
BUN SERPL-MCNC: 14 MG/DL (ref 6–20)
BUN/CREAT SERPL: 13.1 (ref 7–25)
CALCIUM SPEC-SCNC: 9.2 MG/DL (ref 8.6–10.5)
CHLORIDE SERPL-SCNC: 93 MMOL/L (ref 98–107)
CO2 SERPL-SCNC: 35 MMOL/L (ref 22–29)
CREAT SERPL-MCNC: 1.07 MG/DL (ref 0.57–1)
EGFRCR SERPLBLD CKD-EPI 2021: 63.4 ML/MIN/1.73
GLUCOSE SERPL-MCNC: 83 MG/DL (ref 65–99)
POTASSIUM SERPL-SCNC: 2.8 MMOL/L (ref 3.5–5.2)
SODIUM SERPL-SCNC: 138 MMOL/L (ref 136–145)

## 2024-04-18 PROCEDURE — 80048 BASIC METABOLIC PNL TOTAL CA: CPT | Performed by: INTERNAL MEDICINE

## 2024-04-18 PROCEDURE — 36415 COLL VENOUS BLD VENIPUNCTURE: CPT | Performed by: INTERNAL MEDICINE

## 2024-04-19 RX ORDER — POTASSIUM CHLORIDE 20 MEQ/1
TABLET, EXTENDED RELEASE ORAL
Qty: 32 TABLET | Refills: 1 | Status: SHIPPED | OUTPATIENT
Start: 2024-04-19

## 2024-04-28 DIAGNOSIS — G90.511 COMPLEX REGIONAL PAIN SYNDROME TYPE 1 OF RIGHT UPPER EXTREMITY: ICD-10-CM

## 2024-04-29 ENCOUNTER — TELEPHONE (OUTPATIENT)
Dept: NEUROSURGERY | Facility: CLINIC | Age: 49
End: 2024-04-29
Payer: MEDICARE

## 2024-04-29 RX ORDER — GABAPENTIN 600 MG/1
600 TABLET, FILM COATED ORAL DAILY
Qty: 30 TABLET | Refills: 2 | Status: SHIPPED | OUTPATIENT
Start: 2024-04-29

## 2024-04-29 NOTE — TELEPHONE ENCOUNTER
VLADM for Vera Martinez and Julien Briceño on Claim # [id number]. I need an authorization for the PT to se Arthur Cornejo or  for a F/U

## 2024-04-30 ENCOUNTER — TELEPHONE (OUTPATIENT)
Dept: PAIN MEDICINE | Facility: CLINIC | Age: 49
End: 2024-04-30
Payer: MEDICARE

## 2024-04-30 NOTE — TELEPHONE ENCOUNTER
Patient call to see about her refill of Gabapentin. The refill of the prescription  was put in yesterday by Corrina Holley, but is waiting on a cosign ans something to do with worker comp when I spoke to someone [facility] pharmacy. I called patient back and told her that I will her back tomorrow omce I get a response from Corrina.

## 2024-05-02 ENCOUNTER — LAB (OUTPATIENT)
Dept: ENDOCRINOLOGY | Facility: CLINIC | Age: 49
End: 2024-05-02
Payer: MEDICARE

## 2024-05-02 DIAGNOSIS — E87.6 HYPOKALEMIA: ICD-10-CM

## 2024-05-02 DIAGNOSIS — E27.9 LESION OF ADRENAL GLAND: ICD-10-CM

## 2024-05-02 DIAGNOSIS — E87.6 HYPOKALEMIA: Primary | ICD-10-CM

## 2024-05-02 LAB
ANION GAP SERPL CALCULATED.3IONS-SCNC: 7 MMOL/L (ref 5–15)
BUN SERPL-MCNC: 14 MG/DL (ref 6–20)
BUN/CREAT SERPL: 13.3 (ref 7–25)
CALCIUM SPEC-SCNC: 9.6 MG/DL (ref 8.6–10.5)
CHLORIDE SERPL-SCNC: 95 MMOL/L (ref 98–107)
CO2 SERPL-SCNC: 36 MMOL/L (ref 22–29)
CORTIS AM PEAK SERPL-MCNC: 2.82 MCG/DL
CREAT SERPL-MCNC: 1.05 MG/DL (ref 0.57–1)
EGFRCR SERPLBLD CKD-EPI 2021: 64.9 ML/MIN/1.73
GLUCOSE SERPL-MCNC: 103 MG/DL (ref 65–99)
POTASSIUM SERPL-SCNC: 3 MMOL/L (ref 3.5–5.2)
SODIUM SERPL-SCNC: 138 MMOL/L (ref 136–145)

## 2024-05-02 PROCEDURE — 80048 BASIC METABOLIC PNL TOTAL CA: CPT | Performed by: INTERNAL MEDICINE

## 2024-05-02 PROCEDURE — 80299 QUANTITATIVE ASSAY DRUG: CPT | Performed by: INTERNAL MEDICINE

## 2024-05-02 PROCEDURE — 36415 COLL VENOUS BLD VENIPUNCTURE: CPT | Performed by: INTERNAL MEDICINE

## 2024-05-02 PROCEDURE — 82533 TOTAL CORTISOL: CPT | Performed by: INTERNAL MEDICINE

## 2024-05-02 RX ORDER — POTASSIUM CHLORIDE 20 MEQ/1
40 TABLET, EXTENDED RELEASE ORAL DAILY
Qty: 60 TABLET | Refills: 1 | Status: SHIPPED | OUTPATIENT
Start: 2024-05-02

## 2024-05-10 LAB — DEXAMETHASONE SERPL-MCNC: 304 NG/DL

## 2024-05-14 ENCOUNTER — TELEPHONE (OUTPATIENT)
Dept: ENDOCRINOLOGY | Facility: CLINIC | Age: 49
End: 2024-05-14
Payer: MEDICARE

## 2024-05-14 DIAGNOSIS — E27.9 LESION OF ADRENAL GLAND: Primary | ICD-10-CM

## 2024-05-21 ENCOUNTER — TELEPHONE (OUTPATIENT)
Dept: NEUROSURGERY | Facility: CLINIC | Age: 49
End: 2024-05-21
Payer: MEDICARE

## 2024-05-22 ENCOUNTER — LAB (OUTPATIENT)
Dept: ENDOCRINOLOGY | Facility: CLINIC | Age: 49
End: 2024-05-22
Payer: MEDICARE

## 2024-05-22 DIAGNOSIS — E27.9 LESION OF ADRENAL GLAND: ICD-10-CM

## 2024-05-22 LAB
COLLECT DURATION TIME UR: 24 HRS
CREAT UR-MCNC: 84.8 MG/DL
CREATINE 24H UR-MRATE: 0.85 G/24 HR (ref 0.7–1.6)
SPECIMEN VOL 24H UR: 1000 ML

## 2024-05-22 PROCEDURE — 81050 URINALYSIS VOLUME MEASURE: CPT | Performed by: INTERNAL MEDICINE

## 2024-05-22 PROCEDURE — 82570 ASSAY OF URINE CREATININE: CPT | Performed by: INTERNAL MEDICINE

## 2024-05-22 PROCEDURE — 82530 CORTISOL FREE: CPT | Performed by: INTERNAL MEDICINE

## 2024-05-29 LAB
CORTIS F 24H UR-MCNC: 11 UG/L
CORTIS F 24H UR-MRATE: 11 UG/24 HR (ref 6–42)

## 2024-05-30 RX ORDER — BACLOFEN 10 MG/1
TABLET ORAL
Qty: 120 TABLET | Refills: 5 | Status: SHIPPED | OUTPATIENT
Start: 2024-05-30

## 2024-06-04 ENCOUNTER — TELEPHONE (OUTPATIENT)
Dept: PAIN MEDICINE | Facility: CLINIC | Age: 49
End: 2024-06-04

## 2024-06-04 NOTE — TELEPHONE ENCOUNTER
Hub staff attempted to follow warm transfer process and was unsuccessful     Caller: SY [employer]    Relationship to patient: WORK COMP    Best call back number: [phone]    Patient is needing: WORK COMP CALLING FOR PEER TO PEER REGARDING MEDICATION - WOULD BE 10-15 MIN     RE:  GRALISE (GABAPENTIN, BACLOFEN POWDER)

## 2024-06-27 ENCOUNTER — TELEPHONE (OUTPATIENT)
Dept: PAIN MEDICINE | Facility: CLINIC | Age: 49
End: 2024-06-27

## 2024-06-27 NOTE — TELEPHONE ENCOUNTER
Provider: MOHINI WALKER    Caller: KORINA LEZAMA    Relationship to Patient: WC/[employer]ACE[employer]     Phone Number: [phone]    Reason for Call: KORINA CALLING TO SET UP A MEETING WITH MOHINI WAKLER FOR A TASK ASSIGNMENT FOR [employer] REGARDING PT. KORINA STATES IT WOULD ONLY TAKE 15 MINUTES AND WOULD LIKE TO HAVE IT DONE AS SOON AS POSSIBLE, BUT HAS TO BE DONE FACE TO FACE. KORINA ASKED FOR FAX NUMBER TO FAX OVER FORM REGARDING THIS.

## 2024-07-01 ENCOUNTER — OFFICE VISIT (OUTPATIENT)
Dept: NEUROSURGERY | Facility: CLINIC | Age: 49
End: 2024-07-01
Payer: OTHER MISCELLANEOUS

## 2024-07-01 VITALS
TEMPERATURE: 97 F | SYSTOLIC BLOOD PRESSURE: 110 MMHG | DIASTOLIC BLOOD PRESSURE: 80 MMHG | HEIGHT: 66 IN | BODY MASS INDEX: 26.2 KG/M2 | WEIGHT: 163 LBS

## 2024-07-01 DIAGNOSIS — M54.2 CERVICALGIA: ICD-10-CM

## 2024-07-01 DIAGNOSIS — M47.812 FACET ARTHROPATHY, CERVICAL: Primary | ICD-10-CM

## 2024-07-01 PROCEDURE — 99214 OFFICE O/P EST MOD 30 MIN: CPT | Performed by: PHYSICIAN ASSISTANT

## 2024-07-01 RX ORDER — PANTOPRAZOLE SODIUM 40 MG/1
1 TABLET, DELAYED RELEASE ORAL DAILY
COMMUNITY
Start: 2024-06-27

## 2024-07-01 RX ORDER — LORAZEPAM 1 MG/1
1 TABLET ORAL
COMMUNITY
Start: 2024-06-04

## 2024-07-01 RX ORDER — ZOLPIDEM TARTRATE 5 MG/1
5 TABLET ORAL NIGHTLY PRN
COMMUNITY
Start: 2024-06-19

## 2024-07-01 RX ORDER — DEXAMETHASONE 1 MG
1 TABLET ORAL
COMMUNITY
Start: 2024-04-08

## 2024-07-01 NOTE — PROGRESS NOTES
Patient: Nettie Araya  : 1975    Primary Care Provider: Nhi Plascencia APRN      Chief Complaint: Neck pain    History of Present Illness:       Patient is a very sweet 48-year-old female from [place] who is known to the neurosurgical practice for having spinal cord stimulator placed and then had to be revised secondary to advancing through the interlaminar space. Post procedurally she was very thankful and had a lot of reduction in her preoperative pain.     Unfortunately since that time she has had increasing neck pain and has had multiple scans recently showing an increase in the cervical kyphosis at the C5-6 and 7 levels that actually look as though they may be fusing.  Patient also has a little bit of a dynamic listhesis at 4 5 and 3 4.     Patient has not had any MRIs but has continued and exhausted all conservative measures including pain management and they have actually sent her back here for considerations of further interventions.    I briefly mention the possibility of needing to send her to an adult deformity specialist.    Review of Systems   Constitutional:  Negative for activity change, appetite change, chills, diaphoresis, fatigue, fever and unexpected weight change.   HENT:  Negative for congestion, dental problem, drooling, ear discharge, ear pain, facial swelling, hearing loss, mouth sores, nosebleeds, postnasal drip, rhinorrhea, sinus pressure, sinus pain, sneezing, sore throat, tinnitus, trouble swallowing and voice change.    Eyes:  Negative for photophobia, pain, discharge, redness, itching and visual disturbance.   Respiratory:  Negative for apnea, cough, choking, chest tightness, shortness of breath, wheezing and stridor.    Cardiovascular:  Negative for chest pain, palpitations and leg swelling.   Gastrointestinal:  Negative for abdominal distention, abdominal pain, anal bleeding, blood in stool, constipation, diarrhea, nausea, rectal pain and vomiting.   Endocrine:  Negative for cold intolerance, heat intolerance, polydipsia, polyphagia and polyuria.   Genitourinary:  Negative for decreased urine volume, difficulty urinating, dyspareunia, dysuria, enuresis, flank pain, frequency, genital sores, hematuria, menstrual problem, pelvic pain, urgency, vaginal bleeding, vaginal discharge and vaginal pain.   Musculoskeletal:  Positive for neck pain. Negative for arthralgias, back pain, gait problem, joint swelling, myalgias and neck stiffness.   Skin:  Negative for color change, pallor, rash and wound.   Allergic/Immunologic: Negative for environmental allergies, food allergies and immunocompromised state.   Neurological:  Negative for dizziness, tremors, seizures, syncope, facial asymmetry, speech difficulty, weakness, light-headedness, numbness and headaches.   Hematological:  Negative for adenopathy. Does not bruise/bleed easily.   Psychiatric/Behavioral:  Negative for agitation, behavioral problems, confusion, decreased concentration, dysphoric mood, hallucinations, self-injury, sleep disturbance and suicidal ideas. The patient is not nervous/anxious and is not hyperactive.        Past Medical History:     Past Medical History:   Diagnosis Date    Abnormal MRI, shoulder     Right Shoulder MRI 12/2010 revealed small glenohumeral joint effusion, capsulitis, tendinitis. No rotator cuff for labral tear was noted.     Abnormal x-ray     X-Ray of the right wrist on 04/30/2014, revealed linear sclerosis within the distal radial metaphysis consistent with healed fracture. There is irregularity of the ulnar styloid consistent with prior fracture.      Anxiety     Chronic pain disorder ??    Depression     Extremity pain ?    Fractures ??    Headache ??    Headache, tension-type ??    History of constipation     History of insomnia     Seconday to pain    History of migraine headaches     History of transfusion     History of varicose veins     Low back pain     Migraine ??    Neck pain ??     Pain in joint, shoulder region     Raynaud's disease     Reflex sympathetic dystrophy ??       Family History:     Family History   Problem Relation Age of Onset    Diabetes Mother     Hypertension Mother     Stroke Mother     Lymphoma Father     Lung cancer Father     Brain cancer Father     Liver cancer Father     Cancer Father     COPD Brother     Multiple sclerosis Sister     No Known Problems Daughter     No Known Problems Daughter     No Known Problems Daughter     Breast cancer Neg Hx     Ovarian cancer Neg Hx     Uterine cancer Neg Hx     Colon cancer Neg Hx        Social History:    reports that she has never smoked. She has never been exposed to tobacco smoke. She has never used smokeless tobacco. She reports that she does not drink alcohol and does not use drugs.   SMOKING STATUS: Non-smoker    Surgical History:     Past Surgical History:   Procedure Laterality Date    BACK SURGERY      SCS implant    CERVICAL LAMINECTOMY DECOMPRESSION POSTERIOR Bilateral 10/16/2018    Procedure: PARTIAL C2, C3, C4 LAMINECTOMY SPINAL CORD STIMULATOR REMOVAL INSERTION OF SPINAL CORD STIMULATOR;  Surgeon: Jam Pack MD;  Location: [facility];  Service: Neurosurgery     SECTION      x 3    COLONOSCOPY      EPIDURAL BLOCK  ?    FRACTURE SURGERY      ORTHOPEDIC SURGERY  ??    OTHER SURGICAL HISTORY      Left Distal Radiolnar Joint Stabilization and Capsulodesis    ROTATOR CUFF REPAIR      Rotator cuff repair followed by three surgeries and manipulation under anesthesia with Dr. Ludwig. Fourth surgery performed by Dr. Graves.  Removal of suture and subacromial decompression with lysis of adhesions 2011.    SPINAL CORD STIMULATOR IMPLANT N/A 10/16/2018    Procedure: REMOVAL AND REPLACEMENT SPINAL CORD STIMULATOR;  Surgeon: Jam Pack MD;  [facility];  Service: Neurosurgery    SPINAL CORD STIMULATOR IMPLANT N/A 2021    Procedure: SPINAL CORD STIMULATOR REVISION CERVICAL;  Surgeon:  "Jam Pack MD;  Location: [facility];  Service: Neurosurgery;  Laterality: N/A;    SPINE SURGERY  ?    THYROIDECTOMY, PARTIAL      TUBAL ABDOMINAL LIGATION      WRIST SURGERY         Allergies:   Patient has no known allergies.    Physical Exam:    Vital Signs:/80 (BP Location: Left arm, Patient Position: Sitting, Cuff Size: Adult)   Temp 97 °F (36.1 °C) (Infrared)   Ht 167.6 cm (66\")   Wt 73.9 kg (163 lb)   BMI 26.31 kg/m²    BMI: Body mass index is 26.31 kg/m².     GENERAL:   The patient is in no acute distress, and is able to answer all questions appropriately.  Skin:  The incision is well-healed and well approximated.  No signs of infection, bleeding, or erythema.  Musculoskeletal:      Generalized effort dependent weakness   strength is 5 out of 5 bilaterally.   Shoulder abduction is 5 out of 5.    Dorsiflexion is 5/5 Bilaterally  Plantarflexion is 5/5 bilaterally  Hip Flexion 5/5 bilaterally.    The patient´s gait is normal without antalgia.  Neurologic:   The patient is alert and oriented by 3.     Pupils are equal and reactive to light.    Visual fields are full.    Extraocular movements are intact without nystagmus.    There is no evidence of central motor drift. No facial droop.  No difficulty with rapid alternating movements.    Sensation is equal bilaterally with no deficit.      Reflexes:  2+ @ biceps, triceps, brachioradialis, as well as the patellar and Achilles tendon bilaterally.  No sign of Downs's, clonus, or long track signs      Medical Decision Making    Data Review:   CT scan of the cervical spine compared with 1 in 2021 there is an increase of the cervical kyphosis at C5-6 and 7 along with anterior osteophyte fusion.    Central canal appears to be widely patent    Diagnosis:   Neck pain  Status post cervical epidural stimulator   Status post revision after lead advancement    Treatment Options:   Patient has had multiple posterior approaches to place a lead and to put " it back in position.  I would try to avoid any posterior approaches secondary to that complication rate but that being said she really has mainly neck pain denies any consistent radiculopathy.    Hopefully we can avoid any further surgeries but unfortunately she is exhausted all of her conservative options at this point.  We will get MRI and discuss what we are able to offer.    There is a possibility of her requiring an adult deformity consultation    BMI is >= 25 and <30. (Overweight) The following options were offered after discussion;: weight loss educational material (shared in after visit summary)     Diagnosis Plan   1. Cervical facet arthropathy/cervical spondylosis without myelopathy  MRI Cervical Spine With & Without Contrast      2. Cervicalgia  MRI Cervical Spine With & Without Contrast

## 2024-07-02 RX ORDER — ESTRADIOL AND NORETHINDRONE ACETATE 1; .5 MG/1; MG/1
1 TABLET ORAL DAILY
Qty: 30 TABLET | Refills: 0 | Status: SHIPPED | OUTPATIENT
Start: 2024-07-02

## 2024-07-03 DIAGNOSIS — G90.511 COMPLEX REGIONAL PAIN SYNDROME TYPE 1 OF RIGHT UPPER EXTREMITY: ICD-10-CM

## 2024-07-03 NOTE — TELEPHONE ENCOUNTER
Caller: ROSIE JIANG    Relationship: PATIENT    Best call back number: [phone]    Requested Prescriptions:   Requested Prescriptions     Pending Prescriptions Disp Refills    gabapentin, once-daily, (Gralise) 600 MG tablet tablet 30 tablet 2     Sig: Take 1 tablet by mouth Daily.        Pharmacy where request should be sent:  [facility]  [phone]    Last office visit with prescribing clinician: 2/8/2024   Last telemedicine visit with prescribing clinician: Visit date not found   Next office visit with prescribing clinician: 8/8/2024     Additional details provided by patient: PATIENT IS OUT OF RX    Does the patient have less than a 3 day supply:  [x] Yes  [] No    Would you like a call back once the refill request has been completed: [] Yes [] No    If the office needs to give you a call back, can they leave a voicemail: [] Yes [] No    Ines Layne   07/03/24 16:26 EDT

## 2024-07-09 RX ORDER — GABAPENTIN 600 MG/1
600 TABLET, FILM COATED ORAL DAILY
Qty: 30 TABLET | Refills: 2 | Status: SHIPPED | OUTPATIENT
Start: 2024-07-09

## 2024-07-23 ENCOUNTER — TELEPHONE (OUTPATIENT)
Dept: PAIN MEDICINE | Facility: CLINIC | Age: 49
End: 2024-07-23
Payer: MEDICARE

## 2024-07-23 NOTE — TELEPHONE ENCOUNTER
"LVM to move patient appointment up on 08/08/2024 with Corrina Holley, LAURO.     Relay     \"HUB: forward call to office, there are specific slots patients are being moved into.\"      "

## 2024-07-26 RX ORDER — ESTRADIOL AND NORETHINDRONE ACETATE 1; .5 MG/1; MG/1
1 TABLET ORAL DAILY
Qty: 28 TABLET | Refills: 0 | Status: SHIPPED | OUTPATIENT
Start: 2024-07-26

## 2024-07-29 ENCOUNTER — OFFICE VISIT (OUTPATIENT)
Dept: OBSTETRICS AND GYNECOLOGY | Facility: CLINIC | Age: 49
End: 2024-07-29
Payer: MEDICARE

## 2024-07-29 ENCOUNTER — HOSPITAL ENCOUNTER (OUTPATIENT)
Facility: HOSPITAL | Age: 49
Discharge: HOME | End: 2024-07-29
Admitting: PHYSICIAN ASSISTANT
Payer: OTHER MISCELLANEOUS

## 2024-07-29 VITALS
DIASTOLIC BLOOD PRESSURE: 68 MMHG | HEIGHT: 66 IN | BODY MASS INDEX: 25.49 KG/M2 | WEIGHT: 158.6 LBS | SYSTOLIC BLOOD PRESSURE: 100 MMHG

## 2024-07-29 DIAGNOSIS — M54.2 CERVICALGIA: ICD-10-CM

## 2024-07-29 DIAGNOSIS — Z12.31 BREAST CANCER SCREENING BY MAMMOGRAM: ICD-10-CM

## 2024-07-29 DIAGNOSIS — Z12.11 COLON CANCER SCREENING: ICD-10-CM

## 2024-07-29 DIAGNOSIS — R87.810 ASCUS WITH POSITIVE HIGH RISK HPV CERVICAL: ICD-10-CM

## 2024-07-29 DIAGNOSIS — Z01.419 ENCOUNTER FOR GYNECOLOGICAL EXAMINATION WITHOUT ABNORMAL FINDING: Primary | ICD-10-CM

## 2024-07-29 DIAGNOSIS — R87.610 ASCUS WITH POSITIVE HIGH RISK HPV CERVICAL: ICD-10-CM

## 2024-07-29 DIAGNOSIS — M47.812 FACET ARTHROPATHY, CERVICAL: ICD-10-CM

## 2024-07-29 PROCEDURE — G0101 CA SCREEN;PELVIC/BREAST EXAM: HCPCS | Performed by: OBSTETRICS & GYNECOLOGY

## 2024-07-29 PROCEDURE — 0 GADOBENATE DIMEGLUMINE 529 MG/ML SOLUTION: Performed by: PHYSICIAN ASSISTANT

## 2024-07-29 PROCEDURE — 72156 MRI NECK SPINE W/O & W/DYE: CPT

## 2024-07-29 PROCEDURE — 1160F RVW MEDS BY RX/DR IN RCRD: CPT | Performed by: OBSTETRICS & GYNECOLOGY

## 2024-07-29 PROCEDURE — 1159F MED LIST DOCD IN RCRD: CPT | Performed by: OBSTETRICS & GYNECOLOGY

## 2024-07-29 PROCEDURE — A9577 INJ MULTIHANCE: HCPCS | Performed by: PHYSICIAN ASSISTANT

## 2024-07-29 RX ADMIN — GADOBENATE DIMEGLUMINE 15 ML: 529 INJECTION, SOLUTION INTRAVENOUS at 13:52

## 2024-07-29 NOTE — PROGRESS NOTES
Gynecologic Annual Exam Note        CC - Here for Annual Exam.     Subjective     HPI  Nettie Araya is a 49 y.o. female, , who presents for annual well woman exam.  She is perimenopausal.  Patient reports problems with: None.  Partner Status: Marital Status: .  New Partners since last visit: no.   She Reports vasomotor symptoms.  She denies  issues with urinary incontinence.     The patient does not have any complaints today.  Her LMP was almost a year ago.    She has concerns about domestic violence: no.        Additional OB/GYN History     History of abnormal Pap smear: yes - 2023 ASCUS, HPV Pool+ , Colpo 2023  Family history of uterine, colon, breast, or ovarian cancer: no  Performs monthly Self-Breast Exam: yes  Exercises Regularly: no  Feelings of Anxiety or Depression: yes - Currently managed with medication    Last Pap : 2023 ASCUS, HPV Pool+    Last Completed Pap Smear            Ordered - PAP SMEAR (Every 3 Years) Ordered on 2023  LIQUID-BASED PAP SMEAR, [facility] ([facility])    2020  Pap IG, Rfx HPV ASCU                  Last mammogram: 05/15/2023  Last Completed Mammogram            Ordered - MAMMOGRAM (Every 2 Years) Ordered on 2024      05/15/2023  Mammo Diagnostic Digital Tomosynthesis Bilateral With CAD    2023  Mammo Screening Digital Tomosynthesis Bilateral With CAD    2021  Mammo Screening Digital Tomosynthesis Bilateral With CAD                  Last colonoscopy:  years ago  Last Completed Colonoscopy       This patient has no relevant Health Maintenance data.          Last DEXA: Never    Tobacco Usage?: No   OB History          4    Para   4    Term   3       1    AB        Living   3         SAB        IAB        Ectopic        Molar        Multiple        Live Births   3                Health Maintenance   Topic Date Due    COLORECTAL CANCER SCREENING  Never done    TDAP/TD VACCINES (1 - Tdap)  Never done    HEPATITIS C SCREENING  Never done    ANNUAL WELLNESS VISIT  Never done    COVID-19 Vaccine (2023-24 season) Never done    ZOSTER VACCINE (1 of 2) Never done    Annual Gynecologic Pelvic and Breast Exam  2024    INFLUENZA VACCINE  2024    MAMMOGRAM  05/15/2025    BMI FOLLOWUP  2025    PAP SMEAR  2026    Pneumococcal Vaccine 0-64  Aged Out       The additional following portions of the patient's history were reviewed and updated as appropriate: allergies, current medications, past family history, past medical history, past social history, past surgical history, and problem list.    Past Medical History:   Diagnosis Date    Abnormal MRI, shoulder     Right Shoulder MRI 2010 revealed small glenohumeral joint effusion, capsulitis, tendinitis. No rotator cuff for labral tear was noted.     Abnormal x-ray     X-Ray of the right wrist on 2014, revealed linear sclerosis within the distal radial metaphysis consistent with healed fracture. There is irregularity of the ulnar styloid consistent with prior fracture.      Anxiety     Chronic pain disorder ??    Depression     Extremity pain ?    Fractures ??    Headache ??    Headache, tension-type ??    History of constipation     History of insomnia     Seconday to pain    History of migraine headaches     History of transfusion     History of varicose veins     Low back pain     Migraine ??    Neck pain ??    Pain in joint, shoulder region     Raynaud's disease     Reflex sympathetic dystrophy ??        Past Surgical History:   Procedure Laterality Date    BACK SURGERY      SCS implant    CERVICAL LAMINECTOMY DECOMPRESSION POSTERIOR Bilateral 10/16/2018    Procedure: PARTIAL C2, C3, C4 LAMINECTOMY SPINAL CORD STIMULATOR REMOVAL INSERTION OF SPINAL CORD STIMULATOR;  Surgeon: Jam Pack MD;  Location: [facility];  Service: Neurosurgery     SECTION      x 3    COLONOSCOPY      EPIDURAL BLOCK  ?    FRACTURE  "SURGERY      ORTHOPEDIC SURGERY  ??    OTHER SURGICAL HISTORY      Left Distal Radiolnar Joint Stabilization and Capsulodesis    ROTATOR CUFF REPAIR      Rotator cuff repair followed by three surgeries and manipulation under anesthesia with Dr. Ludwig. Fourth surgery performed by Dr. Graves.  Removal of suture and subacromial decompression with lysis of adhesions 1/2011.    SPINAL CORD STIMULATOR IMPLANT N/A 10/16/2018    Procedure: REMOVAL AND REPLACEMENT SPINAL CORD STIMULATOR;  Surgeon: Jam Pack MD;  [facility];  Service: Neurosurgery    SPINAL CORD STIMULATOR IMPLANT N/A 04/28/2021    Procedure: SPINAL CORD STIMULATOR REVISION CERVICAL;  Surgeon: Jam Pack MD;  Location:  [facility];  Service: Neurosurgery;  Laterality: N/A;    SPINE SURGERY  ?    THYROIDECTOMY, PARTIAL      TUBAL ABDOMINAL LIGATION      WRIST SURGERY          Review of Systems   All other systems reviewed and are negative.      I have reviewed and agree with the HPI, ROS, and historical information as entered above. Sherri Gill MD    Objective   /68   Ht 167.6 cm (65.98\")   Wt 71.9 kg (158 lb 9.6 oz)   LMP  (LMP Unknown)   BMI 25.61 kg/m²     Physical Exam  Vitals and nursing note reviewed. Exam conducted with a chaperone present.   Constitutional:       General: She is awake.   HENT:      Head: Normocephalic and atraumatic.   Neck:      Thyroid: No thyroid mass, thyromegaly or thyroid tenderness.   Cardiovascular:      Rate and Rhythm: Normal rate.      Heart sounds: No murmur heard.     No friction rub. No gallop.   Pulmonary:      Effort: Pulmonary effort is normal.      Breath sounds: Normal breath sounds. No stridor. No wheezing, rhonchi or rales.   Chest:      Chest wall: No mass or tenderness.   Breasts:     Right: Normal. No bleeding, inverted nipple, mass, nipple discharge, skin change or tenderness.      Left: Normal. No bleeding, inverted nipple, mass, nipple discharge, skin change or " tenderness.   Abdominal:      Palpations: Abdomen is soft.      Tenderness: There is no guarding or rebound.      Hernia: There is no hernia in the left inguinal area or right inguinal area.   Genitourinary:     General: Normal vulva.      Pubic Area: No rash.       Labia:         Right: No rash, tenderness, lesion or injury.         Left: No rash, tenderness, lesion or injury.       Urethra: No prolapse, urethral swelling or urethral lesion.      Vagina: No foreign body. Bleeding (light) present. No vaginal discharge, erythema, tenderness or lesions.      Cervix: No cervical motion tenderness, discharge, friability, lesion, erythema or cervical bleeding.      Uterus: Normal. Not deviated, not enlarged, not fixed and not tender.       Adnexa: Right adnexa normal and left adnexa normal.        Right: No mass, tenderness or fullness.          Left: No mass, tenderness or fullness.        Rectum: No external hemorrhoid.   Musculoskeletal:      Cervical back: Normal range of motion.   Lymphadenopathy:      Upper Body:      Right upper body: No supraclavicular or axillary adenopathy.      Left upper body: No supraclavicular or axillary adenopathy.   Skin:     General: Skin is warm.   Neurological:      Mental Status: She is alert and oriented to person, place, and time.   Psychiatric:         Mood and Affect: Mood and affect normal.         Speech: Speech normal.         Assessment & Plan     Assessment     Problem List Items Addressed This Visit    None  Visit Diagnoses       Encounter for gynecological examination without abnormal finding    -  Primary    Breast cancer screening by mammogram        Relevant Orders    Mammo Screening Digital Tomosynthesis Bilateral With CAD    ASCUS with positive high risk HPV cervical        Relevant Orders    LIQUID-BASED PAP SMEAR WITH HPV GENOTYPING REGARDLESS OF INTERPRETATION ([facility])    Colon cancer screening        Relevant Orders    Ambulatory Referral For Screening  Colonoscopy            Plan     Reviewed monthly self breast exams.  Instructed to call with lumps, pain, or breast discharge.  Yearly mammograms ordered.  Ordered mammogram today.  Recommended use of Vitamin D and getting adequate calcium in her diet. (1500mg)  Colonoscopy recommended.  RTC in 1 year or PRN with problems.  Pt with light bleeding noted on exam today.  Pt reports rare episodes of light bleeding, doesn't track them, but ? A little less than year ago for LMP.   Repeat pap today, discussed poss cristóbal Gill MD  07/29/2024

## 2024-07-30 DIAGNOSIS — G90.511 COMPLEX REGIONAL PAIN SYNDROME TYPE 1 OF RIGHT UPPER EXTREMITY: ICD-10-CM

## 2024-07-31 RX ORDER — GABAPENTIN 600 MG/1
600 TABLET, FILM COATED ORAL DAILY
Qty: 30 TABLET | Refills: 5 | Status: SHIPPED | OUTPATIENT
Start: 2024-07-31

## 2024-07-31 NOTE — TELEPHONE ENCOUNTER
Surgery/Procedure:  diagnostic bilateral cervical medial branch RFTC at C4, C5, C6; for bilateral cervical facet joints at C4-C5, C5-C6   Surgery/Procedure Date:  02/06/2023  Last visit:  Office Visit with Corrina Holley APRN (02/08/2024)   Next visit: 08/08/2024  Last filled: gabapentin, once-daily, (Gralise) 600 MG tablet tablet (07/09/2024)       Reason for call:         Automated refill request for Gralise 600 MG, medication pending. 0 refills, 30 tablets.     Requested Prescriptions     Pending Prescriptions Disp Refills    Gralise 600 MG tablet tablet [Pharmacy Med Name: GRALISE 600MG TABLET (ONCE DAILY)] 30 tablet      Sig: TAKE 1 TABLET BY MOUTH ONCE DAILY

## 2024-08-01 ENCOUNTER — OFFICE VISIT (OUTPATIENT)
Dept: NEUROSURGERY | Facility: CLINIC | Age: 49
End: 2024-08-01
Payer: OTHER MISCELLANEOUS

## 2024-08-01 VITALS — HEIGHT: 66 IN | TEMPERATURE: 97.8 F | BODY MASS INDEX: 24.91 KG/M2 | WEIGHT: 155 LBS

## 2024-08-01 DIAGNOSIS — M54.2 CERVICALGIA: Primary | ICD-10-CM

## 2024-08-01 NOTE — PROGRESS NOTES
Patient: Nettie Araya  : 1975    Primary Care Provider: Nhi Plascencia APRN      Chief Complaint: Cervical neck pain worsening radiculopathy    History of Present Illness:       Is a very pleasant 49-year-old female well-known to me for complex history of axial back pain as well as progressive degenerative collapse of the cervical canal centering on the C5-6 and C6-7 areas she has recalcitrant right-sided radicular complaints of the upper extremity and she came to me for placement of a cervical spinal paddle.  She has a history of chronic pain but seems to do okay and underwent quite well with her procedures in 2018 and     She reports a bit of worsening clumsiness in her upper extremities as well as worsening right-sided radicular complaints the symptoms are mild but significant she does not have her  with her she is here for evaluation she denies Lhermitte's phenomenon no other significant changes  Review of Systems    Past Medical History:     Past Medical History:   Diagnosis Date    Abnormal MRI, shoulder     Right Shoulder MRI 2010 revealed small glenohumeral joint effusion, capsulitis, tendinitis. No rotator cuff for labral tear was noted.     Abnormal x-ray     X-Ray of the right wrist on 2014, revealed linear sclerosis within the distal radial metaphysis consistent with healed fracture. There is irregularity of the ulnar styloid consistent with prior fracture.      Anemia     Anxiety     Chronic pain disorder ??    Depression     Extremity pain ?    Fractures ??    Headache ??    Headache, tension-type ??    History of constipation     History of insomnia     Seconday to pain    History of migraine headaches     History of transfusion     History of varicose veins     Low back pain     Migraine ??    Neck pain ??    Pain in joint, shoulder region     Raynaud's disease     Reflex sympathetic dystrophy ??       Family History:     Family History   Problem Relation Age of  Onset    Diabetes Mother     Hypertension Mother     Stroke Mother     Lymphoma Father     Lung cancer Father     Brain cancer Father     Liver cancer Father     Cancer Father     COPD Brother     Multiple sclerosis Sister     No Known Problems Daughter     No Known Problems Daughter     No Known Problems Daughter     Heart disease Maternal Grandmother     Breast cancer Neg Hx     Ovarian cancer Neg Hx     Uterine cancer Neg Hx     Colon cancer Neg Hx        Social History:    reports that she has never smoked. She has never been exposed to tobacco smoke. She has never used smokeless tobacco. She reports that she does not drink alcohol and does not use drugs.   SMOKING STATUS: She is a non-smoker    Surgical History:     Past Surgical History:   Procedure Laterality Date    BACK SURGERY      SCS implant    CERVICAL LAMINECTOMY DECOMPRESSION POSTERIOR Bilateral 10/16/2018    Procedure: PARTIAL C2, C3, C4 LAMINECTOMY SPINAL CORD STIMULATOR REMOVAL INSERTION OF SPINAL CORD STIMULATOR;  Surgeon: Jam Pack MD;  Location:  [facility];  Service: Neurosurgery     SECTION      x 3    COLONOSCOPY      EPIDURAL BLOCK  ?    FRACTURE SURGERY      ORTHOPEDIC SURGERY  ??    OTHER SURGICAL HISTORY      Left Distal Radiolnar Joint Stabilization and Capsulodesis    ROTATOR CUFF REPAIR      Rotator cuff repair followed by three surgeries and manipulation under anesthesia with Dr. Ludwig. Fourth surgery performed by Dr. Graves.  Removal of suture and subacromial decompression with lysis of adhesions 2011.    SPINAL CORD STIMULATOR IMPLANT N/A 10/16/2018    Procedure: REMOVAL AND REPLACEMENT SPINAL CORD STIMULATOR;  Surgeon: Jam Pack MD;  Location:  [facility];  Service: Neurosurgery    SPINAL CORD STIMULATOR IMPLANT N/A 2021    Procedure: SPINAL CORD STIMULATOR REVISION CERVICAL;  Surgeon: Jam Pack MD;  Location:  [facility];  Service: Neurosurgery;  Laterality: N/A;    SPINE SURGERY  ?     "THYROIDECTOMY, PARTIAL      TUBAL ABDOMINAL LIGATION      WRIST SURGERY         Allergies:   Patient has no known allergies.    Physical Exam:    Vital Signs:Temp 97.8 °F (36.6 °C) (Temporal)   Ht 167.6 cm (66\")   Wt 70.3 kg (155 lb)   LMP  (LMP Unknown)   BMI 25.02 kg/m²    BMI: Body mass index is 25.02 kg/m².         She is wide-awake alert and follows commands appears in no apparent distress    She has a \"sniffing posture \"of the cervical spine    She is 5 exaggerated kyphosis of the cervicothoracic junction    She has good strength in upper extremities with some guarding secondary to her RSD like symptoms    She is strong however with perhaps a touch of right-sided intrinsic atrophy of the right hand    She has no evidence of definitive Camila's clonus or long tract findings she has some reflexes in the knees but overall is able to ambulate without assistance her gait is wide-based but very stable clinically within the realm of normal for her given her complex symptomatology    Medical Decision Making    Data Review:   Personally reviewed and interpreted MRI of the cervical spine and compared to prior imaging she has quite a kyphotic angulation mid cervical spine with hyperlordosis above with a \"small swan-neck \"deformity in progress she has moderate to more significant types of central canal stenosis without signal change behind her spinal cord stimulator lead with a small new disc herniation at 3 4    Overall comparatively things look mostly stable but she has had enough imaging that we will look back quite a ways we can see progression of her degeneration    Diagnosis:   1.  History of complex cervical spinal disease multilevel degenerative disc with swan-neck deformity    2.  Concerns for osteopenia    3.  Chronic axial pain with right-sided chronic radicular pain    4.  History of spinal cord stimulator    Treatment Options:   I talked her about the treatment options right now her symptoms and " function seem to.  Quite good she denies any progression of her symptoms, Lhermitte's phenomenon and clinically I cannot detect a myelopathy other than a slightly unsteady gait.  I talked about surgical risks.  I explained the risk benefits and expected outcome of major elective surgery for their problem, complications from approach, and infection, the risk of neurologic implications after surgery as well as need for repeat surgeries and most importantly failure to achieve quality of life improvement from the surgery to the patient.  I explained the complex nature she would likely require anterior posterior surgery we may do an anterior cervical discectomy and fusion and see if we get her somewhat reduced as far as her kyphosis and then I would potentially have to do a posterior cervical laminectomy and multilevel fusion explained this is a lot of surgery could impact her life negatively and there will be no guarantees regarding a pain-free outcome but depending on the progression of her symptoms it may be an option    After extensive discussion and shared decision making we have elected to monitor her we will see her back with a DEXA scan to look for osteopenia and a repeat cervical flexion-extension film and to make further discussions pending those results I instructed her to bring her  possible         No diagnosis found.

## 2024-08-05 ENCOUNTER — TELEPHONE (OUTPATIENT)
Dept: PAIN MEDICINE | Facility: CLINIC | Age: 49
End: 2024-08-05
Payer: MEDICARE

## 2024-08-05 NOTE — TELEPHONE ENCOUNTER
"HUB AGENT ATTEMPTED NON-CLINICAL WARM TRANSFER - NO ANSWER     PLEASE CALL / LEAVE VMAIL [phone] TO NOTIFY JAMA, etc. WITH [facility] WHETHER PATIENT's URINE COLLECTION KIT ARRIVED TODAY 8-05-24 OR NOT?     JAMA STATED IT WOULD'VE ARRIVED VIA FED EX IN A LITTLE PURPLE BAG. VERIFIED [facility] ADDRESS w/[facility] # 302. JAMA STATED FED EX SHOWING DELIVERY / DROP-OFF TODAY 8/05 @ 2652 SIGNED FOR / BY \"AARON\"     TO BE USED TO COLLECT URINE SAMPLE AT [place] 08-08-24 APPT     THANKS       "

## 2024-08-07 LAB — REF LAB TEST METHOD: NORMAL

## 2024-08-07 NOTE — TELEPHONE ENCOUNTER
LVM advising that we had received the kit, however, our office typically receives a saliva screening kit, not a urine kit and there is not a place for the patient to provide a sample with a chaperone. Requested they call back advising what steps they would like us to take next.

## 2024-08-07 NOTE — TELEPHONE ENCOUNTER
Caller: JAMA KAPOOR     Relationship to Patient: [facility]    Phone Number: [phone]    Reason for Call: CALLED IN TO CHECK IF WE HAVE GOTTEN DRUG TEST THAT WAS DELIVERED BY [employer] ON MONDAY 8-5-24 AND WANTED TO MAKE SURE WE COULD COLLECT THE URINE SAMPLE AT TOMORROW'S VISIT 8-8-24

## 2024-08-08 ENCOUNTER — DOCUMENTATION (OUTPATIENT)
Dept: PAIN MEDICINE | Facility: CLINIC | Age: 49
End: 2024-08-08
Payer: MEDICARE

## 2024-08-08 ENCOUNTER — OFFICE VISIT (OUTPATIENT)
Dept: PAIN MEDICINE | Facility: CLINIC | Age: 49
End: 2024-08-08
Payer: OTHER MISCELLANEOUS

## 2024-08-08 VITALS
HEART RATE: 75 BPM | TEMPERATURE: 97.4 F | WEIGHT: 156.3 LBS | HEIGHT: 66 IN | BODY MASS INDEX: 25.12 KG/M2 | OXYGEN SATURATION: 99 %

## 2024-08-08 DIAGNOSIS — M47.812 CERVICAL SPONDYLOSIS WITHOUT MYELOPATHY: ICD-10-CM

## 2024-08-08 DIAGNOSIS — R53.81 PHYSICAL DECONDITIONING: ICD-10-CM

## 2024-08-08 DIAGNOSIS — Z46.2 ENCOUNTER FOR FITTING AND ADJUSTMENT OF NEUROPACEMAKER OF SPINAL CORD: Primary | ICD-10-CM

## 2024-08-08 DIAGNOSIS — G56.81 SUPRASCAPULAR ENTRAPMENT NEUROPATHY OF RIGHT SIDE: ICD-10-CM

## 2024-08-08 DIAGNOSIS — G90.511 COMPLEX REGIONAL PAIN SYNDROME TYPE 1 OF RIGHT UPPER EXTREMITY: ICD-10-CM

## 2024-08-08 DIAGNOSIS — Z96.82 PRESENCE OF NEUROSTIMULATOR: ICD-10-CM

## 2024-08-08 DIAGNOSIS — F32.A DEPRESSION, UNSPECIFIED DEPRESSION TYPE: ICD-10-CM

## 2024-08-08 DIAGNOSIS — M79.18 MYOFASCIAL PAIN: ICD-10-CM

## 2024-08-08 PROCEDURE — 99214 OFFICE O/P EST MOD 30 MIN: CPT | Performed by: NURSE PRACTITIONER

## 2024-08-08 PROCEDURE — 95970 ALYS NPGT W/O PRGRMG: CPT | Performed by: NURSE PRACTITIONER

## 2024-08-08 RX ORDER — DULOXETIN HYDROCHLORIDE 60 MG/1
60 CAPSULE, DELAYED RELEASE ORAL DAILY
Qty: 90 CAPSULE | Refills: 5 | Status: SHIPPED | OUTPATIENT
Start: 2024-08-08

## 2024-08-08 RX ORDER — BACLOFEN 10 MG/1
10 TABLET ORAL 4 TIMES DAILY
Qty: 40 TABLET | Refills: 5 | Status: SHIPPED | OUTPATIENT
Start: 2024-08-08

## 2024-08-08 RX ORDER — NORTRIPTYLINE HYDROCHLORIDE 10 MG/1
10 CAPSULE ORAL NIGHTLY
Qty: 60 CAPSULE | Refills: 5 | Status: SHIPPED | OUTPATIENT
Start: 2024-08-08

## 2024-08-08 RX ORDER — GABAPENTIN 600 MG/1
600 TABLET, FILM COATED ORAL DAILY
Qty: 30 TABLET | Refills: 5 | Status: SHIPPED | OUTPATIENT
Start: 2024-08-08

## 2024-08-08 NOTE — TELEPHONE ENCOUNTER
Spoke with Gilberto.   Advised that urine test does not need to be observed.   Pickup was also set up for sample.   Advised that additional faxed paperwork does not need faxed back.

## 2024-08-08 NOTE — PROGRESS NOTES
Urine sample provided by patient according to [facility] directions.   Sample packaged according to [facility] directions.   Package left with  for pickup

## 2024-08-08 NOTE — PROGRESS NOTES
"Chief Complaint: \"Neck pain.  Medication refill\"       History of Present Illness:   Patient: Ms. Nettie Araya, 49 y.o. female was last seen on 02/08/2024 by me for follow-up evaluation and medication refill. Patient returns to the clinic for evaluation of her chronic pain, possible spinal cord stimulator reprogramming, and medication refill.  She underwent initial implantation of her spinal cord stimulator device on October 16, 2018, with Dr. Jam Pack.  She underwent revision of the Medtronic cervical spinal cord stimulator lead with extension to the bilateral C4 laminar space with Dr. Jam Pack on 4/28/2021.  Since revision surgery she is doing very well, and reports improvement in her hyperesthesia, hyperalgesia and allodynia in her arms, due to her CRPS which was the reason for her initial placement.  She has continued to complain of what appeared to be more mechanical/axial neck pain that had increased in severity over time.  Therefore on February 6, 2023 she underwent bilateral cervical medial branch rhizotomies at C4, C5, and C6, unfortunately this has failed to provide her with any significant improvement in her continuing neck pain.  A recent MRI of the cervical spine demonstrates multilevel facet arthritis, and disc osteophyte complex formations.  At C4-C5 there is mild to moderate central spinal stenosis, at C5-C6 and C6-C7 there are disc osteophyte complex formations with severe central spinal stenosis and moderate to severe bilateral neuroforaminal stenosis.  Due to her ongoing symptoms, and failure to achieve relief with ongoing spinal cord stimulation, I have referred her for neurosurgical consultation.  She recently did see Dr. Pack on August 1, 2024, and he did discuss the possibility of surgical intervention, although with the complex nature of her spine, this could be quite difficult.     Radiation of pain: The pain does not radiate. She has had some instances of " "parasthesias into her arms more frequently since I last saw her.   Hyperesthesia, hyperalgesia and allodynia in her arms had been significantly improved since revision of her SCS device and reprogramming   Pain intensity today: 6/10 with stimulator \"turned on\"    Average pain intensity last week: 5/10  Pain intensity ranges from: 4/10 to 7/10  Aggravating factors: Pain increases with flexion, rotation, and and especially extension of the cervical spine, with movement of her upper extremities involving her shoulders..   Alleviating factors: Pain decreases with analgesics and rest, and \"my spinal cord stimulator device\".   Associated symptoms:   Patient denies  pain, numbness and weakness in the bilateral upper lower extremities. She has had some instances of parasthesias into her arms.  Patient denies  any new bladder or bowel problems.   Patient reports difficulties with her balance but denies recent falls.     Pain History:   Ms. Nettie Araya, 44 y.o. female has a long standing history of complex regional pain syndrome type 1 of the right shoulder as a result of a work related injury occurring in September 2009.  She underwent explantation of spinal cord stimulator device inserted by Dr. Kaveh Randle on January 20, 2014 after it was discovered by X-rays in 2016 the lead on the left side of the posterior epidural space had migrated.  The patient had a history of multiple falls which could be the cause of the lead migration. On October 16, 2018 she underwent explantation of percutaneous SCS device followed by implantation of a spinal cord stimulator device with Dr. Jam Pack with Medtronic. Medtronic Specify SureScan 2x8 MRI paddle (MRI full body in 1.5 Bee) with the top electrodes projecting at the level of the superior endplate of the C2 vertebral level. IPG: [employer] with AdaptiveStim (MRI compatible full body). The device was implanted primarily for treatment of CRPS of the upper " extremities.     Review of previous therapies and additional medical records:  Nettie Araya has already failed the following measures, including:   Conservative measures: oral analgesics, opioids, physical therapy, chiropractic therapy and TENs   Interventional measures: SGBs, SCS originally implanted in 1/2014 with Medtronic [employer] MRI compatible Sure Scan with two leads with the top electrodes projecting at the level of the superior endplate of C3.   04/17/2017: bilateral cervical RFTC  02/06/2023: Bilateral cervical RFA  Surgical measures: SCS implant revision as referenced above  She did undergo neurosurgical consultation on 7/13/2023 with Arthur Cornejo PA-C, he discussed the possibility of getting an opinion with an adult deformity specialist secondary to the high degree of spinal listhesis as well as kyphosis in her cervical spine.  She recently did see Dr. Pack on August 1, 2024, and he did discuss the possibility of surgical intervention, although with the complex nature of her spine, this could be quite difficult.    Nettie Araya presents with significant comorbidities including anxiety and depression,  engaged in treatment., CRPS, and migraine headaches engaged in treatment.   In terms of current analgesics, Nettie Araya takes: Gralise 600 mg at bedtime, nortriptyline 10-20 mg qhs., Diclofenac, tizanidine 2 mg prn. Patient also takes Ambien, Ativan, Cymbalta, and Wellbutrin. Patient denies any side effects from her medications.   I have reviewed the [facility] Report is consistent to medication reconciliation.    Global Pain Scale 11-01-  2018 05-22-  2019 11-13-  2019 06-15  2020 02-24  2021 11-16  2021 07-20  2022 08-16  2023 02-8  2024    Pain 8 15 17 11 16 11 10 12 12    Feelings 10 13 17 10 26 19 13 10 11    Clinical outcomes 15 19 20 13 20 14 13 11 15    Activities 10 10 11 11 4 17 8 14 15    GPS Total: 43 57 65 45 66 61 43 47 53        NECK PAIN DISABILITY INDEX  QUESTIONNAIRE  DATE 08-16 2023 02-08 2024          Pain intensity  0: No pain  1: Mild  2: Moderate  3: Fairly severe  4: very severe  5: Worst imaginable 3 3          Personal Care   0: I can look after myself normally without causing extra pain.  1: I can look after myself normally, but it causes extra pain.  2: It is painful to look after myself and I am slow and careful.  3: I need some help, but manage most of my personal care.  4: I need help every day in most aspects of self-care.  5: I do not get dressed; I wash with difficulty and stay in bed. 3  2          Lifting  0: I can lift heavy weights without extra pain.  1: I can lift heavy weights, but it gives extra pain.  2: Pain prevents me from lifting heavy weights off the floor but I can manage if they are conveniently positioned, for example, on a table.  3: Pain prevents me from lifting heavy weights, but I can manage light to medium weights if they are conveniently positioned.  4: I can lift very light weights.  5: I cannot lift or carry anything at all. 4  4          Reading  0: I can read as much as I want to with no pain in my neck.  1: I can read as much as I want to with slight pain in my neck.  2: I can read as much as I want to with moderate pain in my neck.  3: I cannot read as much as I want because of moderate pain in my neck.  4: I cannot read as much as I want because of severe pain in my neck.  5: I cannot read at all. 4  2          Headaches  0: I have no headaches at all.  1: I have slight headaches which come infrequently.  2: I have moderate headaches which come infrequently.  3: I have moderate headaches which come frequently.  4: I have severe headaches which come frequently.  5: I have headaches almost all the time. 5  3          Concentration  0: I can concentrate fully when I want to with no difficulty.  1: I can concentrate fully when I want to with slight difficulty.  2: I have a fair degree of difficulty in concentrating when  I want to.  3: I have a lot of difficulty in concentrating when I want to.  4: I have a great deal of difficulty in concentrating when I want to.  5: I cannot concentrate at all. 3  2          Work  0: I can do as much work as I want to.  1: I can only do my usual work, but no more.  2: I can do most of my usual work, but no more.  3: I cannot do my usual work.  4: I can hardly do any work at all.  5: I cannot do any work at all. 5  4          Driving  0: I can drive my car without any neck pain.  1: I can drive my car as long as I want with slight pain in my neck.  2: I can drive my car as long as I want with moderate pain in my   neck.  3: I cannot drive my car as long as I want because of moderate pain   in my neck.  4: I can hardly drive at all because of severe pain in my neck.  5: I cannot drive my car at all. 4  3          Sleeping  0: I have no trouble sleeping.  1: My sleep is slightly disturbed (less than 1 hour sleepless).  2: My sleep is mildly disturbed (1-2 hours sleepless).  3: My sleep is moderately disturbed (2-3 hours sleepless).  4: My sleep is greatly disturbed (3-5 hours sleepless).  5: My sleep is completely disturbed (5-7 hours) 5  3          Recreation  0: I am able to engage in all of my recreational activities with no neck pain at all.  1: I am able to engage in all of my recreational activities with some pain in my neck.  2: I am able to engage in most, but not all of my recreational activities because of pain in my neck.  3: I am able to engage in a few of my recreational activities because of pain in my neck.  4: I can hardly do any recreational activities because of pain in my neck.  5: I cannot do any recreational activities at all. 5 3          TOTAL SCORE 43 33            Review of New Diagnostic Studies:  MRI of the cervical spine demonstrates multilevel facet arthritis, and disc osteophyte complex formations.  At C4-C5 there is mild to moderate central spinal stenosis, at C5-C6 and  C6-C7 there are disc osteophyte complex formations with severe central spinal stenosis and moderate to severe bilateral neuroforaminal stenosis.     Review of Diagnostic Studies:  Cervical spine x-rays with flexion-extension views demonstrate reversal of the normal cervical lordosis with anterior listhesis of C2 on C3, C3 on C4 and C4 on C5.  Multilevel moderate advanced disc disease most significant at C5-C6 and C6-C7.  No instability.  MRI of the cervical spine with and without contrast 5/16/2023: Postoperative changes from prior C4 laminectomy and spinal cord stimulator placement.  There is straightening and reversal of the normal cervical lordosis with minimal retrolisthesis of C5 on C6.  Spinal cord appears normal in caliber and signal.  C2-C3: Disc osteophyte complex with facet arthritis, no significant spinal canal or neuroforaminal stenosis.  C3-C4: Disc osteophyte complex with facet arthritis with mild central spinal stenosis and bilateral neuroforaminal stenosis.  C4-C5: Disc osteophyte complex with facet arthritis and mild to moderate central spinal stenosis with bilateral neuroforaminal stenosis.  C5-C6: Disc osteophyte complex with facet arthritis with severe central spinal stenosis and bilateral neuroforaminal stenosis which has worsened from previous studies.  C6-C7: Disc osteophyte complex with facet arthritis with severe central spinal stenosis and moderate to severe bilateral neuroforaminal stenosis.  Cervical spine x-rays 2/25/2021: I have reviewed the images.  Spinal cord similar leads are projecting posterior to the C2 and C3 lamina in the midline.  Multilevel spondylitic changes are present most advanced at C4-C5 and C5-C6, with minimal anterolisthesis of C4 on C5.  CT of the cervical spine without contrast 3/19/2021: I have reviewed the images.  Vertebral body heights are well-maintained with some straightening of the normal cervical lordosis.  There is minimal retrolisthesis of C5 on C6.   Spinal cord stimulator leads are projecting posterior through the C2-C3 interlaminar space and out of the canal.  There are additionally multilevel spondylitic changes most likely advanced at C5-C6 with disc osteophyte complex formation.    Review of Systems   Musculoskeletal:  Positive for back pain, myalgias, neck pain and neck stiffness.   Neurological:  Positive for dizziness, numbness and headaches.   All other systems reviewed and are negative.        Patient Active Problem List   Diagnosis    Knee pain    Depression    Cervical facet arthropathy/cervical spondylosis without myelopathy    Atrophy, muscle disuse    Myofascial pain    Muscle spasm    Suprascapular entrapment neuropathy of right side    Frozen shoulder    Insomnia    Mild obesity    Chronic tension-type headache, not intractable    Physical deconditioning    Common migraine without aura    Adrenal nodule    Lung nodule, multiple    Encounter for fitting and adjustment of neuropacemaker of spinal cord    Complex regional pain syndrome type 1 of right upper extremity    CPRS 1 (complex regional pain syndrome I) of upper limb    Spinal cord stimulator dysfunction    Cervicalgia       Past Medical History:   Diagnosis Date    Abnormal MRI, shoulder     Right Shoulder MRI 12/2010 revealed small glenohumeral joint effusion, capsulitis, tendinitis. No rotator cuff for labral tear was noted.     Abnormal x-ray     X-Ray of the right wrist on 04/30/2014, revealed linear sclerosis within the distal radial metaphysis consistent with healed fracture. There is irregularity of the ulnar styloid consistent with prior fracture.      Anemia     Anxiety     Chronic pain disorder ??    Depression     Extremity pain ?    Fractures ??    Headache ??    Headache, tension-type ??    History of constipation     History of insomnia     Seconday to pain    History of migraine headaches     History of transfusion     History of varicose veins     Low back pain     Migraine  ??    Neck pain ??    Pain in joint, shoulder region     Raynaud's disease     Reflex sympathetic dystrophy ??         Past Surgical History:   Procedure Laterality Date    BACK SURGERY      SCS implant    CERVICAL LAMINECTOMY DECOMPRESSION POSTERIOR Bilateral 10/16/2018    Procedure: PARTIAL C2, C3, C4 LAMINECTOMY SPINAL CORD STIMULATOR REMOVAL INSERTION OF SPINAL CORD STIMULATOR;  Surgeon: Jam Pack MD;  Location:  [facility];  Service: Neurosurgery     SECTION      x 3    COLONOSCOPY      EPIDURAL BLOCK  ?    FRACTURE SURGERY      ORTHOPEDIC SURGERY  ??    OTHER SURGICAL HISTORY      Left Distal Radiolnar Joint Stabilization and Capsulodesis    ROTATOR CUFF REPAIR      Rotator cuff repair followed by three surgeries and manipulation under anesthesia with Dr. Ludwig. Fourth surgery performed by Dr. Graves.  Removal of suture and subacromial decompression with lysis of adhesions 2011.    SPINAL CORD STIMULATOR IMPLANT N/A 10/16/2018    Procedure: REMOVAL AND REPLACEMENT SPINAL CORD STIMULATOR;  Surgeon: Jam Pack MD;  Location:  [facility];  Service: Neurosurgery    SPINAL CORD STIMULATOR IMPLANT N/A 2021    Procedure: SPINAL CORD STIMULATOR REVISION CERVICAL;  Surgeon: Jam Pack MD;  Location:  [facility];  Service: Neurosurgery;  Laterality: N/A;    SPINE SURGERY  ?    THYROIDECTOMY, PARTIAL      TUBAL ABDOMINAL LIGATION      WRIST SURGERY           Family History   Problem Relation Age of Onset    Diabetes Mother     Hypertension Mother     Stroke Mother     Lymphoma Father     Lung cancer Father     Brain cancer Father     Liver cancer Father     Cancer Father     COPD Brother     Multiple sclerosis Sister     No Known Problems Daughter     No Known Problems Daughter     No Known Problems Daughter     Heart disease Maternal Grandmother     Breast cancer Neg Hx     Ovarian cancer Neg Hx     Uterine cancer Neg Hx     Colon cancer Neg Hx          Social History      Socioeconomic History    Marital status:    Tobacco Use    Smoking status: Never     Passive exposure: Never    Smokeless tobacco: Never   Vaping Use    Vaping status: Never Used   Substance and Sexual Activity    Alcohol use: Never    Drug use: Never    Sexual activity: Yes     Partners: Male     Birth control/protection: Tubal ligation, Pill           Current Outpatient Medications:     acyclovir (ZOVIRAX) 400 MG tablet, Take 1 tablet by mouth 2 (Two) Times a Day. for 10 days, Disp: , Rfl:     Aimovig 140 MG/ML prefilled syringe, Inject 1 mL under the skin into the appropriate area as directed Every 30 (Thirty) Days., Disp: , Rfl:     amLODIPine (NORVASC) 2.5 MG tablet, Take 1 tablet by mouth Daily., Disp: , Rfl:     baclofen (LIORESAL) 10 MG tablet, TAKE 1 TABLET BY MOUTH FOUR TIMES DAILY, Disp: 120 tablet, Rfl: 5    buPROPion SR (WELLBUTRIN SR) 150 MG 12 hr tablet, Take 1 tablet by mouth Every 12 (Twelve) Hours., Disp: , Rfl: 0    diclofenac (VOLTAREN) 50 MG EC tablet, Take 1 tablet by mouth 2 (Two) Times a Day., Disp: 180 tablet, Rfl: 1    DULoxetine (CYMBALTA) 60 MG capsule, Take 1 capsule by mouth Daily., Disp: 90 capsule, Rfl: 5    estradiol-norethindrone (ACTIVELLA) 1-0.5 MG per tablet, Take 1 tablet by mouth once daily, Disp: 28 tablet, Rfl: 0    Gabapentin Enacarbil  MG tablet controlled-release, , Disp: , Rfl:     Gabapentin powder, , Disp: , Rfl:     gabapentin, once-daily, (Gralise) 600 MG tablet tablet, TAKE 1 TABLET BY MOUTH ONCE DAILY, Disp: 30 tablet, Rfl: 5    Gel Base gel, Apply 1 to 2 grams of pain gel to affected areas three to four times a day., Disp: 240 g, Rfl: PRN    LORazepam (ATIVAN) 1 MG tablet, 1 tablet., Disp: , Rfl:     nortriptyline (PAMELOR) 10 MG capsule, TAKE 1 CAPSULE BY MOUTH EVERY NIGHT. MAY TAKE 1 TO 2 CAPSULES AT NIGHT, Disp: 60 capsule, Rfl: 5    ondansetron (ZOFRAN) 4 MG tablet, Take 1 tablet by mouth Every 8 (Eight) Hours As Needed for Nausea or Vomiting.,  Disp: , Rfl:     pantoprazole (PROTONIX) 40 MG EC tablet, Take 1 tablet by mouth Daily., Disp: , Rfl:     traZODone (DESYREL) 100 MG tablet, Take 1 tablet by mouth every night at bedtime., Disp: , Rfl:     ubrogepant 100 MG tablet, TAKE 1 TABLET BY MOUTH AT ONSET OF HEADACHE, Disp: , Rfl:     zolpidem (AMBIEN) 5 MG tablet, Take 1 tablet by mouth At Night As Needed., Disp: , Rfl:       No Known Allergies      LMP  (LMP Unknown)       Physical Exam:  Constitutional: Patient is oriented to person, place, and time.   Patient appears well-developed and well-nourished.   Head: Normocephalic and atraumatic. Eyes: Conjunctivae and lids are normal. Pupils: Equal, round, reactive to light.   Neck: Trachea normal. Neck supple. No JVD present.   Lymphatic: No cervical adenopathy  Pulmonary Respiratory effort: No increased work of breathing or signs of respiratory distress.  Peripheral vascular exam: Normal.   Musculoskeletal   Gait and station: Gait evaluation demonstrated a normal gait   Cervical spine: Passive and active range of motion are significantly limited secondary to pain. Extension, flexion, lateral flexion, rotation of the cervical spine increased and reproduced pain. Cervical facet joint loading maneuvers are positive.  Muscles: Presence of active trigger points levator scapulae.  Tenderness noted in the bilateral occipital and temporalis regions.  Shoulders: The range of motion of the glenohumeral joints is limited secondary to pain. Rotator cuff strength is 5/5.   Neurological:   Patient is alert and oriented to person, place, and time.   Speech: speech is normal.   Cortical function: Normal mental status.   Cranial nerves: Cranial nerves 2-12 intact.   Reflex Scores:  Right brachioradialis:2+  Left brachioradialis: 2+  Right biceps: 2+  Left biceps: 2+  Right triceps: 2+  Left triceps: 2+  Motor strength: 5/5  Motor Tone: normal tone.   Involuntary movements: none.   Superficial/Primitive Reflexes: primitive  reflexes were absent.   Right Downs: absent  Left Downs: absent  Right ankle clonus: absent  Left ankle clonus: absent   Babinsky: absent   Spurling sign is equivocal. Neck tornado test is negative. Lhermitte sign is negative. Negative long tract signs.   Sensation: No sensory loss. Sensory exam: intact to light touch, intact pain and temperature sensation, intact vibration sensation and normal proprioception. There is a reduction in the hyperalgesia, hyperesthesia, and allodynia in the right forearm and right hand from prior exam. Minimal hyperalgesia, hyperesthesia, or allodynia on the left shoulder and left arm.  Coordination: Normal finger to nose and heel to shin. Normal balance and negative Romberg's sign   Skin and subcutaneous tissue: No rash noted. No cyanosis.   Psychiatric: Judgment and insight: Normal. Orientation to person, place and time: Normal. Recent and remote memory: Intact. Mood and affect: Normal.     PROCEDURE: Analysis of the spinal cord stimulator device without complex reprogramming  Analysis of the stimulator reveals that patient used the Medtronic spinal cord stimulator device average 60% of the time. Analysis of impedence reveals normal impedence for all contacts. The spinal cord simulator device has 3 programs.      Program A1 (Preferred Program)  Electrode polarities: 0+,3-, 8+, 11-  Amplitude: 2.0 mA     Pulse width: 460 mcs  Rate: 340 Hz         ASSESSMENT:   1. Complex regional pain syndrome type 1 of right upper extremity    2. Cervical spondylosis without myelopathy    3. Suprascapular entrapment neuropathy of right side    4. Myofascial pain    5. Physical deconditioning    6. Depression, unspecified depression type    7. Presence of neurostimulator    8. Encounter for fitting and adjustment of neuropacemaker of spinal cord              PLAN/MEDICAL DECISION MAKING: Ms. Nettie Araya, 49 y.o. female has a long standing history of complex regional pain syndrome type 1 of  the right shoulder as a result of a work related injury occurring in September 2009. She underwent explantation of spinal cord stimulator device inserted by Dr. Kaveh Randle in 2014. She underwent recent revision of the Medtronic cervical spinal cord stimulator lead with extension to the bilateral C4 laminar space with Dr. Jam Pack on 4/28/2021.  Since revision surgery she is doing very well, and reports significant improvement in her hyperesthesia, hyperalgesia and allodynia in her arms, due to her CRPS which was the reason for her initial placement.  She does continue with mechanical neck pain, from which she has underwent treatment with bilateral cervical rhizotomies in the past.  Therefore on February 6, 2023 she underwent bilateral cervical medial branch rhizotomies at C4, C5, and C6, unfortunately this has failed to provide her with any significant improvement in her continuing neck pain.  A recent MRI of the cervical spine demonstrates multilevel facet arthritis, and disc osteophyte complex formations.  At C4-C5 there is mild to moderate central spinal stenosis, at C5-C6 and C6-C7 there are disc osteophyte complex formations with severe central spinal stenosis and moderate to severe bilateral neuroforaminal stenosis.  Due to her ongoing symptoms, and failure to achieve relief with ongoing spinal cord stimulation, I have referred her for neurosurgical consultation.  She recently did see Dr. Pack on August 1, 2024, and he did discuss the possibility of surgical intervention, although with the complex nature of her spine, this could be quite difficult.   Her spinal cord stimulator has provided her with significant improvement in her CRPS symptoms, although she continues with diffuse neck pain, with some vague radicular complaints. She remains in full compliance with medication regimen, SCS device and follow-up evaluations. I had a lengthy conversation with Ms. Nettie Araya regarding her chronic  pain condition and potential therapeutic options including risks, benefits, alternative therapies, to name a few.  I have reviewed all available patient's medical records as well as previous therapies as referenced above.  Therefore, I have proposed the following plan:  1. Follow up PRN for SCS reprogramming and in 6 months for medication refill.  2. Pharmacological measures: Reviewed and discussed. Patient also takes trazodone, Ativan, Cymbalta, Ambien, Aimovig and Wellbutrin. Patient denies any side effects from her medications.   A. Continue Gralise 600 mg with evening meals.  B. Continue nortriptyline 10-20 mg at bedtime.  C. Continue baclofen 10 mg 4 times daily as needed for muscle spasms.  D. Continue Lidocaine 10%, clonidine 0.1%, baclofen 5%, cyclobenzaprine 4%, amitriptyline 2%, gabapentin 10%, prilocaine 2% gel, apply gel to the right shoulder as needed.  E. Continue Diclofenac 50 mg two times daily only when necessary for breakthrough pain. Patient has been instructed again not to take any other NSAIDs.  F. Random UDS for compliance  3. Long-term rehabilitation efforts:  A. The patient does not have a history of recent falls within the last 3 months. I did complete a risk assessment for falls.   B. Start an exercise program such as water therapy, daily walks for fitness and progressive strength training  4. The patient has been instructed to contact my office with any questions or difficulties. The patient understands the plan and agrees to proceed accordingly.    Nettie Araya reports a pain score of .  Given her pain assessment as noted, treatment options were discussed and the following options were decided upon as a follow-up plan to address the patient's pain: continuation of current treatment plan for pain, home exercises and therapy, prescription for non-opiod analgesics, and use of non-medical modalities (ice, heat, stretching and/or behavior modifications).    Pain Medications                baclofen (LIORESAL) 10 MG tablet TAKE 1 TABLET BY MOUTH FOUR TIMES DAILY    buPROPion SR (WELLBUTRIN SR) 150 MG 12 hr tablet Take 1 tablet by mouth Every 12 (Twelve) Hours.    diclofenac (VOLTAREN) 50 MG EC tablet Take 1 tablet by mouth 2 (Two) Times a Day.    DULoxetine (CYMBALTA) 60 MG capsule Take 1 capsule by mouth Daily.    nortriptyline (PAMELOR) 10 MG capsule TAKE 1 CAPSULE BY MOUTH EVERY NIGHT. MAY TAKE 1 TO 2 CAPSULES AT NIGHT    traZODone (DESYREL) 100 MG tablet Take 1 tablet by mouth every night at bedtime.               Patient Care Team:  Nhi Plascencia APRN as PCP - General (Nurse Practitioner)  Kaveh Randle MD as Consulting Physician (Pain Medicine)  Nettie Garcia DO as Consulting Physician (Neurology)  Jam Pack MD as Consulting Physician (Neurosurgery)  Sherri Gill MD as Gynecologist (Obstetrics and Gynecology)  Corrina Holley APRN as Nurse Practitioner (Nurse Practitioner)  Iwona Leblanc MD as Consulting Physician (Endocrinology)     No orders of the defined types were placed in this encounter.        Future Appointments   Date Time Provider Department Center   8/8/2024 10:30 AM Corrina Holley APRN [facility]   9/26/2024 11:30 AM Iwona Leblanc MD [facility]         LAURO Oakley

## 2024-08-13 ENCOUNTER — TELEPHONE (OUTPATIENT)
Dept: PAIN MEDICINE | Facility: CLINIC | Age: 49
End: 2024-08-13
Payer: MEDICARE

## 2024-08-13 NOTE — TELEPHONE ENCOUNTER
Caller: JAMA   Relationship to Patient: [employer]   Phone Number: [phone]   Reason for Call:   CALLING STATING THAT THE PATIENTS URINE SAMPLE LEAKED AND THE TEST IS NOW INVALID ATTEMPTED TO WT

## 2024-08-21 NOTE — TELEPHONE ENCOUNTER
Spoke with Bryon at [facility]  Their call was to determine if our office can hold kit until patients next appointment.   Verified that we would hold kit

## 2024-08-28 RX ORDER — ESTRADIOL AND NORETHINDRONE ACETATE 1; .5 MG/1; MG/1
1 TABLET ORAL DAILY
Qty: 28 TABLET | Refills: 0 | Status: SHIPPED | OUTPATIENT
Start: 2024-08-28

## 2024-09-22 DIAGNOSIS — G90.511 COMPLEX REGIONAL PAIN SYNDROME TYPE 1 OF RIGHT UPPER EXTREMITY: ICD-10-CM

## 2024-09-23 RX ORDER — ESTRADIOL AND NORETHINDRONE ACETATE 1; .5 MG/1; MG/1
1 TABLET ORAL DAILY
Qty: 28 TABLET | Refills: 5 | Status: SHIPPED | OUTPATIENT
Start: 2024-09-23

## 2024-09-24 ENCOUNTER — TELEPHONE (OUTPATIENT)
Dept: PAIN MEDICINE | Facility: CLINIC | Age: 49
End: 2024-09-24
Payer: MEDICARE

## 2024-09-24 RX ORDER — NORTRIPTYLINE HCL 10 MG
CAPSULE ORAL
Qty: 60 CAPSULE | Refills: 5 | Status: SHIPPED | OUTPATIENT
Start: 2024-09-24

## 2024-11-04 ENCOUNTER — HOSPITAL ENCOUNTER (OUTPATIENT)
Dept: BONE DENSITY | Facility: HOSPITAL | Age: 49
Discharge: HOME | End: 2024-11-04
Admitting: NEUROLOGICAL SURGERY
Payer: OTHER MISCELLANEOUS

## 2024-11-04 DIAGNOSIS — M54.2 CERVICALGIA: ICD-10-CM

## 2024-11-04 PROCEDURE — 77080 DXA BONE DENSITY AXIAL: CPT

## 2024-11-11 ENCOUNTER — HOSPITAL ENCOUNTER (EMERGENCY)
Dept: HOSPITAL 22 - ER | Age: 49
Discharge: LEFT BEFORE DISCHARGE | End: 2024-11-11
Payer: MEDICARE

## 2024-11-11 VITALS
HEART RATE: 82 BPM | SYSTOLIC BLOOD PRESSURE: 125 MMHG | OXYGEN SATURATION: 98 % | RESPIRATION RATE: 18 BRPM | DIASTOLIC BLOOD PRESSURE: 59 MMHG | TEMPERATURE: 98.6 F

## 2024-11-11 VITALS — BODY MASS INDEX: 25 KG/M2
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
## 2024-11-11 DIAGNOSIS — R07.81: ICD-10-CM
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
## 2024-11-11 DIAGNOSIS — Z53.29: ICD-10-CM
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
## 2024-11-11 DIAGNOSIS — M54.9: Primary | ICD-10-CM

## 2024-11-11 DIAGNOSIS — R10.9: ICD-10-CM

## 2024-11-11 PROCEDURE — 99283 EMERGENCY DEPT VISIT LOW MDM: CPT

## 2024-11-11 RX ADMIN — LIDOCAINE 1 EACH: 50 PATCH TOPICAL at 17:43

## 2024-11-11 RX ADMIN — METHOCARBAMOL 500 MG: 500 TABLET, FILM COATED ORAL at 17:43

## 2024-11-11 RX ADMIN — ACETAMINOPHEN 1000 MG: 500 TABLET ORAL at 17:43

## 2024-11-13 ENCOUNTER — TELEPHONE (OUTPATIENT)
Dept: NEUROSURGERY | Facility: CLINIC | Age: 49
End: 2024-11-13
Payer: MEDICARE

## 2024-11-13 NOTE — TELEPHONE ENCOUNTER
Left a voice mail message for pt. If she calls back Would she like to change her 11.22.2024 apt to 11.14.2024 as Dr. Pack has some openings.

## 2024-11-15 ENCOUNTER — TELEPHONE (OUTPATIENT)
Dept: PAIN MEDICINE | Facility: CLINIC | Age: 49
End: 2024-11-15
Payer: MEDICARE

## 2024-12-17 ENCOUNTER — TELEPHONE (OUTPATIENT)
Dept: PAIN MEDICINE | Facility: CLINIC | Age: 49
End: 2024-12-17
Payer: MEDICARE

## 2024-12-17 NOTE — TELEPHONE ENCOUNTER
Provider: [facility] [employer]    Caller: ANDIE    Phone Number: [phone]    Reason for Call: SPOKE WITH ANDIE SHE STATES THAT SHE FAXED OVER PAPERWORK ON 12.17.24 TO THE PRACTICE-  ANDIE WOULD LIKE TO KNOW IF THE PAPERWORK HAS BEEN RECEIVED AND IF SO , SHE WOULD LIKE TO SCHEDULE A PEER TO PEER AND DISCUSS THE WORK COMP MEDICATION    PLEASE CONTACT ANDIE AND ADVISE

## 2024-12-20 NOTE — TELEPHONE ENCOUNTER
"  Caller: BASB GUILLEN - \"[facility]\" [employer]    Best call back number: [phone]    What is the best time to reach you: IN OFFICE 5915-4208 CST (HAS SECURE [employer])     PLEASE CALL TO ADVISE Ms BRICE AT MY [facility] IF / WHEN PAPERWORK TO SCHEDULE PEER TO PEER HAS BEEN RECEIVED FROM FAX SENT TO [facility] 12/17 & TO DISCUSS PEER TO PEER SCHEDULING     Ms. BRICE INFORMED CAN TAKE 1-2 BUSINESS DAYS TO ADDRESS NON-URGENT MESSAGES & UP TO 24 BUSINESS HOURS TO ADDRESS HIGH PRIORITY MESSAGES     Ms. BRICE INFORMED [facility] OFF[facility] WILL CALL HER AS SOON AS AVAILABLE TO FOLLOW UP ON MESSAGES LEFT 12/17 & YESTERDAY 12/19     THANKS   "

## 2024-12-23 NOTE — TELEPHONE ENCOUNTER
Called Migdalia back. No answer, left voicemail advising her that we have received the paper work and that once the provider is in office we will call and schedule the peer to peer.

## 2024-12-23 NOTE — TELEPHONE ENCOUNTER
"    Hub staff attempted to follow warm transfer process and was unsuccessful     Caller: ABBS Benjamin \"[facility]\" [employer]    Best call back number: [phone]    Patient is needing: NEEDING TO MAKE SURE THE PAPERWORK WAS RECEIVED     "

## 2025-01-28 NOTE — TELEPHONE ENCOUNTER
[facility] Hospitalist Medicine Progress Note:  01/28/25    SUBJECTIVE:  The patient was working with physical therapy.  He was very exhausted and short of breath.  The patient nurse was at bedside as well.  No plan for ramp study as yet.    OBJECTIVE:  I/O's  Intake/Output Summary (Last 24 hours) at 1/28/2025 1006  Last data filed at 1/28/2025 0859  Gross per 24 hour   Intake 1647.36 ml   Output 4350 ml   Net -2702.64 ml       Last Recorded Vitals  Blood pressure (!) 84/71, pulse 90, temperature 99.1 °F (37.3 °C), temperature source Oral, resp. rate (!) 24, height 6' 3.98\" (1.93 m), weight 107 kg (235 lb 14.3 oz), SpO2 93%.  Body mass index is 28.73 kg/m².    Physical Exam  Vitals and nursing note reviewed.   Constitutional:       Appearance: Normal appearance.      Comments: Multiple drips+   HENT:      Head: Normocephalic and atraumatic.      Nose: Nose normal.      Mouth/Throat:      Mouth: Mucous membranes are moist.      Neck: Neck supple. No rigidity.   Neck:      Vascular: JVD present. No carotid bruit.      Trachea: Trachea normal.   Cardiovascular:      Rate and Rhythm: Regular rhythm. Tachycardia present.      Comments: ECMO+  Pulmonary:      Effort: Pulmonary effort is normal.   Abdominal:      Palpations: Abdomen is soft.   Musculoskeletal:         General: No swelling.   Skin:     General: Skin is warm.      Capillary Refill: Capillary refill takes less than 2 seconds.   Neurological:      General: No focal deficit present.      Mental Status: He is oriented to person, place, and time.   Psychiatric:         Mood and Affect: Mood is anxious. Affect is labile.         Speech: Speech is rapid and pressured.         Behavior: Behavior is cooperative.         Thought Content: Thought content is paranoid.     Labs:  Recent Labs   Lab 01/28/25  0300 01/27/25  1634 01/27/25  0300 01/26/25  0306   WBC 18.0*  --  16.0* 15.3*   RBC 2.92*  --  2.93* 2.95*   HGB 8.3* 8.7* 8.5* 8.4*   HCT 26.3* 27.2* 26.4* 26.2*   PLT  Request # : [id number]    01/27/2020 Gabapentin 0 1975 24 30 ElijahsvetlanaKaveh queen [facility]  [place] 3  02/19/2020 Gralise 600MG 1975 30 30 ElijahsvetlanaberniceKaveh [facility] [place] 2  02/19/2020 Lorazepam 1MG 1975 90 30 FanlaNhi [facility] [place] 2  02/20/2020 Zolpidem Tartrate 10MG 1975 30 30 FanlaNhi [facility] [place] 2  02/27/2020 Gabapentin 0 1975 24 30 ElijahsvetlanaKaveh queen [facility]  [facility] 3  03/18/2020 Gralise 600MG 1975 30 30 ElijahKaveh henderson [facility] [place] 1  04/21/2020 Gabapentin 0 1975 24 30 ElijahKaveh henderson [facility]  [place] 3  04/21/2020 Gralise 600MG 1975 30 30 ElijahKaveh henderson [facility] [place] 1  04/21/2020 Zolpidem Tartrate 10MG 1975 30 30 [facility] [place] [facility]    Left on prescriber line    237  --  198 150     Recent Labs   Lab 01/28/25  0300 01/27/25  1634 01/27/25  0300 01/26/25  0926 01/26/25  0306   SODIUM 137  --  134*  --  133*   POTASSIUM 3.5 3.6 3.9   < > 3.8   CHLORIDE 97  --  98  --  98   CO2 29  --  28  --  26   BUN 66*  --  59*  --  52*   CREATININE 2.83*  --  2.69*  --  2.17*   CALCIUM 8.8  --  8.7  --  8.8   ALBUMIN 2.7*  --  2.6*  --  2.6*   BILIRUBIN 0.7  --  0.8  --  0.9   ALKPT 122*  --  121*  --  125*   GPT 16  --  17  --  18   AST 16  --  17  --  19   GLUCOSE 168*  --  192*  --  181*    < > = values in this interval not displayed.     Imaging:  No images are attached to the encounter.     ASSESSMENT/PLAN:  Patient Active Problem List   Diagnosis    HFrEF (heart failure with reduced ejection fraction)  (CMD)    CHF (congestive heart failure), NYHA class IV, unspecified failure chronicity, unspecified type  (CMD)      #Acute on chronic heart failure with reduced ejection fraction, possibly secondary to nonischemic etiology status post LVAD and RVAD placement  Telemetry  NT-BNP on admission was 2517  Ejection fraction was 23%  GDMT  Strict I's and O's  Low-sodium diet  TTE pending  Continue Statin, Plavix, Jardiance  Cardiology consulted.  Appreciate recommendations  Patient will be evaluated for LVAD versus OHT  [place]-Miroslava catheter placed on 1/8-removed on 1/13  Multiple specialties consulted for LVAD versus OHT workup clearance  Multiple inotropes on flow, wean to keep MAP above 65  CPM without change, with the exception of added hydralazine to soften systolic BP  LVAD placement on 1/16  ECMO placed on 1/16  Frequent orientation encouraged  Exercise in bed encouraged  Highly anxious, panicky.   Psych was consulted.  Cymbalta was ordered.     #Diabetes mellitus  Insulin drip on flow  Endocrinology consulted.  Appreciate recommendations     #CKD stage IIIa  Avoid nephrotoxins  Continue torsemide  Nephrology consulted.  Appreciate recommendations     #Left nare epistaxis  Afrin spray  ordered  ENT consulted.  Appreciate recommendations     Critical care time: Over 35 minutes was spent in the management of the patient    DVT Prophylaxis:    Current Active Medications for DVT Prophylaxis (From admission, onward)           Stop     warfarin (COUMADIN) tablet 5 mg  5 mg,   Oral,   ONCE         --     WARFARIN - PHYSICIAN MONITORED 1 each  1 each,   Does not apply,   EVERY EVENING         --     heparin (porcine) injection 5,000 Units  5,000 Units,   Subcutaneous,   3 times per day         --           CODE STATUS:    Code Status: Full Resuscitation     DISPO:  To be determined based on the patient's response to currently prescribed therapeutics which will be modified/adjusted based on the results of ongoing evaluations and pending test results in consideration of input from subspeciality consultants.    PCP:  Kary Rinaldi MD     [facility]  [phone]    Greater than 50% of the time caring for this patient was spent documenting care, reviewing test results/prior records, care coordination and communicating the above care plan to the patient and/or authorized person(s), along with fellow members of the care team.     The time of this note being filed does not necessarily correlate with the timing of when the patient was seen and evaluated.     Portions of this note have been dictated using Dragon voice recognition software.  Variances in spelling and occasional wrong word or \"sound-alike\" substitutions may have occurred due to the inherent limitations of voice recognition ([id number]) software.  Please read the chart carefully and recognize, using context, where the substitutions may have inadvertently occurred.  Please notify the author if any discrepancies are noted or if the meaning of any statement is unclear.

## 2025-02-05 ENCOUNTER — TELEPHONE (OUTPATIENT)
Dept: PAIN MEDICINE | Facility: CLINIC | Age: 50
End: 2025-02-05
Payer: MEDICARE

## 2025-02-05 NOTE — TELEPHONE ENCOUNTER
Provider: AARON    Caller: JAMA - [employer]    Phone Number: [phone]*    Reason for Call: JAMA CALLING WANTING TO KNOW IF OFFICE STILL HAS ORAL MOUTH SWAB DRUG TEST FOR WC TO COLLECT AT NEXT VISIT. PLEASE ADVISE.

## 2025-02-17 ENCOUNTER — TELEPHONE (OUTPATIENT)
Dept: PAIN MEDICINE | Facility: CLINIC | Age: 50
End: 2025-02-17
Payer: MEDICARE

## 2025-02-17 DIAGNOSIS — G90.511 COMPLEX REGIONAL PAIN SYNDROME TYPE 1 OF RIGHT UPPER EXTREMITY: ICD-10-CM

## 2025-02-17 NOTE — TELEPHONE ENCOUNTER
Caller: Nettie Araya    Relationship to patient: Self    Best call back number: [phone] (home)       Chief complaint: RIGHT SIDE OF BODY (NECK BACK SHOULDER)    Type of visit: FOLLOW UP    REQUESTED date:  3-27-25 CURRENTLY BOOKED FOR, BUT WOULD LIKE A SOONER THAN FIRST AVAILABLE WHICH IS AT THE END OF MARCH    If rescheduling, when is the original appointment: 2-12-25     TRY TO REACH OUT TO OFFICE DUE TO APPOINTMENT SHOWING AVAILABLE ON WEDNESDAY 2-19-25 NO ANSWER

## 2025-02-18 RX ORDER — GABAPENTIN 600 MG/1
600 TABLET, FILM COATED ORAL DAILY
Qty: 30 TABLET | Refills: 6 | Status: SHIPPED | OUTPATIENT
Start: 2025-02-18

## 2025-02-19 NOTE — TELEPHONE ENCOUNTER
Called patient paint no answer. Left voicemail advising patient that she will need to keep the scheduled appointment and to give us a call back if she has any questions.

## 2025-02-24 ENCOUNTER — TELEPHONE (OUTPATIENT)
Dept: PAIN MEDICINE | Facility: CLINIC | Age: 50
End: 2025-02-24
Payer: MEDICARE

## 2025-02-24 ENCOUNTER — HOSPITAL ENCOUNTER (OUTPATIENT)
Dept: GENERAL RADIOLOGY | Facility: HOSPITAL | Age: 50
Discharge: HOME | End: 2025-02-24
Admitting: NEUROLOGICAL SURGERY
Payer: OTHER MISCELLANEOUS

## 2025-02-24 ENCOUNTER — OFFICE VISIT (OUTPATIENT)
Dept: NEUROSURGERY | Facility: CLINIC | Age: 50
End: 2025-02-24
Payer: OTHER MISCELLANEOUS

## 2025-02-24 VITALS — TEMPERATURE: 97.9 F | BODY MASS INDEX: 20.93 KG/M2 | HEIGHT: 65 IN | WEIGHT: 125.6 LBS

## 2025-02-24 DIAGNOSIS — M54.2 CERVICALGIA: Primary | ICD-10-CM

## 2025-02-24 DIAGNOSIS — M54.2 CERVICALGIA: ICD-10-CM

## 2025-02-24 DIAGNOSIS — M50.30 DEGENERATION OF CERVICAL INTERVERTEBRAL DISC: ICD-10-CM

## 2025-02-24 PROCEDURE — 99214 OFFICE O/P EST MOD 30 MIN: CPT | Performed by: NEUROLOGICAL SURGERY

## 2025-02-24 PROCEDURE — 72050 X-RAY EXAM NECK SPINE 4/5VWS: CPT

## 2025-02-24 RX ORDER — CHLORTHALIDONE 50 MG/1
1 TABLET ORAL DAILY
COMMUNITY
Start: 2025-01-13

## 2025-02-24 RX ORDER — OMEPRAZOLE 20 MG/1
1 CAPSULE, DELAYED RELEASE ORAL DAILY
COMMUNITY
Start: 2025-01-13

## 2025-02-24 RX ORDER — FERROUS SULFATE 325(65) MG
1 TABLET ORAL DAILY
COMMUNITY
Start: 2025-02-06

## 2025-02-24 RX ORDER — POTASSIUM CHLORIDE 1500 MG/1
2 TABLET, EXTENDED RELEASE ORAL EVERY 12 HOURS SCHEDULED
COMMUNITY
Start: 2025-01-27

## 2025-02-24 NOTE — PROGRESS NOTES
NAME: ROSIE JIANG   DOS: 2025  : 1975  PCP: Nhi Plascencia APRN    Chief Complaint:    Chief Complaint   Patient presents with    Follow-up     F/U with bone scan.     Neck Pain    Arm Pain     Bilateral arm pain.       History of Present Illness:  50 y.o. female   I saw this 50-year-old female in neurosurgical consultation.  She is well-known to me for  she underwent a cervical laminectomy with placement of the spinal cord stimulator followed by revision in  she did quite well with that she does have a history of significant anxiety and some weight loss and presents with increasing neck pain she seems worse off than I have seen in the past she denies any clear-cut weakness and is here for evaluation    PMHX  Allergies:  No Known Allergies  Medications    Current Outpatient Medications:     acyclovir (ZOVIRAX) 400 MG tablet, Take 1 tablet by mouth 2 (Two) Times a Day. for 10 days, Disp: , Rfl:     Aimovig 140 MG/ML prefilled syringe, Inject 1 mL under the skin into the appropriate area as directed Every 30 (Thirty) Days., Disp: , Rfl:     amLODIPine (NORVASC) 2.5 MG tablet, Take 1 tablet by mouth Daily., Disp: , Rfl:     baclofen (LIORESAL) 10 MG tablet, Take 1 tablet by mouth 4 (Four) Times a Day., Disp: 40 tablet, Rfl: 5    buPROPion SR (WELLBUTRIN SR) 150 MG 12 hr tablet, Take 1 tablet by mouth Every 12 (Twelve) Hours., Disp: , Rfl: 0    chlorthalidone (HYGROTEN) 50 MG tablet, Take 1 tablet by mouth Daily., Disp: , Rfl:     diclofenac (VOLTAREN) 50 MG EC tablet, Take 1 tablet by mouth 2 (Two) Times a Day., Disp: 180 tablet, Rfl: 1    DULoxetine (CYMBALTA) 60 MG capsule, Take 1 capsule by mouth Daily., Disp: 90 capsule, Rfl: 5    estradiol-norethindrone (ACTIVELLA) 1-0.5 MG per tablet, Take 1 tablet by mouth once daily, Disp: 28 tablet, Rfl: 5    FeroSul 325 (65 Fe) MG tablet, Take 1 tablet by mouth Daily., Disp: , Rfl:     Gabapentin powder, , Disp: , Rfl:     gabapentin,  once-daily, (Gralise) 600 MG tablet tablet, TAKE 1 TABLET BY MOUTH DAILY, Disp: 30 tablet, Rfl: 6    Gel Base gel, Apply 1 to 2 grams of pain gel to affected areas three to four times a day., Disp: 102 g, Rfl: PRN    LORazepam (ATIVAN) 1 MG tablet, 1 tablet., Disp: , Rfl:     nortriptyline (PAMELOR) 10 MG capsule, TAKE 1 CAPSULE BY MOUTH EVERY NIGHT. MAY TAKE 1 TO 2 CAPSULE AT NIGHT, Disp: 60 capsule, Rfl: 5    omeprazole (priLOSEC) 20 MG capsule, Take 1 capsule by mouth Daily., Disp: , Rfl:     ondansetron (ZOFRAN) 4 MG tablet, Take 1 tablet by mouth Every 8 (Eight) Hours As Needed for Nausea or Vomiting., Disp: , Rfl:     pantoprazole (PROTONIX) 40 MG EC tablet, Take 1 tablet by mouth Daily., Disp: , Rfl:     potassium chloride (KLOR-CON M20) 20 MEQ CR tablet, Take 2 tablets by mouth Every 12 (Twelve) Hours., Disp: , Rfl:     traZODone (DESYREL) 100 MG tablet, Take 1 tablet by mouth every night at bedtime., Disp: , Rfl:     ubrogepant 100 MG tablet, TAKE 1 TABLET BY MOUTH AT ONSET OF HEADACHE, Disp: , Rfl:     zolpidem (AMBIEN) 5 MG tablet, Take 1 tablet by mouth At Night As Needed., Disp: , Rfl:   Past Medical History:  Past Medical History:   Diagnosis Date    Abnormal MRI, shoulder     Right Shoulder MRI 12/2010 revealed small glenohumeral joint effusion, capsulitis, tendinitis. No rotator cuff for labral tear was noted.     Abnormal x-ray     X-Ray of the right wrist on 04/30/2014, revealed linear sclerosis within the distal radial metaphysis consistent with healed fracture. There is irregularity of the ulnar styloid consistent with prior fracture.      Anemia     Anxiety     Chronic pain disorder ??    Depression     Extremity pain ?    Fractures ??    Headache ??    Headache, tension-type ??    History of constipation     History of insomnia     Seconday to pain    History of migraine headaches     History of transfusion     History of varicose veins     Low back pain     Migraine ??    Neck pain ??    Pain  in joint, shoulder region     Raynaud's disease     Reflex sympathetic dystrophy ??     Past Surgical History:  Past Surgical History:   Procedure Laterality Date    BACK SURGERY      SCS implant    CERVICAL LAMINECTOMY DECOMPRESSION POSTERIOR Bilateral 10/16/2018    Procedure: PARTIAL C2, C3, C4 LAMINECTOMY SPINAL CORD STIMULATOR REMOVAL INSERTION OF SPINAL CORD STIMULATOR;  Surgeon: Jam Pack MD;  Location: [facility] OR;  Service: Neurosurgery     SECTION      x 3    COLONOSCOPY      EPIDURAL BLOCK  ?    FRACTURE SURGERY      ORTHOPEDIC SURGERY  ??    OTHER SURGICAL HISTORY      Left Distal Radiolnar Joint Stabilization and Capsulodesis    ROTATOR CUFF REPAIR      Rotator cuff repair followed by three surgeries and manipulation under anesthesia with Dr. Ludwig. Fourth surgery performed by Dr. Graves.  Removal of suture and subacromial decompression with lysis of adhesions 2011.    SPINAL CORD STIMULATOR IMPLANT N/A 10/16/2018    Procedure: REMOVAL AND REPLACEMENT SPINAL CORD STIMULATOR;  Surgeon: Jam Pack MD;  Location: [facility] OR;  Service: Neurosurgery    SPINAL CORD STIMULATOR IMPLANT N/A 2021    Procedure: SPINAL CORD STIMULATOR REVISION CERVICAL;  Surgeon: Jam Pack MD;  Location: [facility] OR;  Service: Neurosurgery;  Laterality: N/A;    SPINE SURGERY  ?    THYROIDECTOMY, PARTIAL      TUBAL ABDOMINAL LIGATION      WRIST SURGERY       Social Hx:  Social History     Tobacco Use    Smoking status: Never     Passive exposure: Never    Smokeless tobacco: Never   Vaping Use    Vaping status: Never Used   Substance Use Topics    Alcohol use: Never    Drug use: Never     Family Hx:  Family History   Problem Relation Age of Onset    Diabetes Mother     Hypertension Mother     Stroke Mother     Lymphoma Father     Lung cancer Father     Brain cancer Father     Liver cancer Father     Cancer Father     COPD Brother     Multiple sclerosis Sister     No Known Problems Daughter      No Known Problems Daughter     No Known Problems Daughter     Heart disease Maternal Grandmother     Breast cancer Neg Hx     Ovarian cancer Neg Hx     Uterine cancer Neg Hx     Colon cancer Neg Hx      Review of Systems:        Review of Systems   Constitutional:  Negative for activity change, appetite change, chills, diaphoresis, fatigue, fever and unexpected weight change.   HENT:  Negative for congestion, dental problem, drooling, ear discharge, ear pain, facial swelling, hearing loss, mouth sores, nosebleeds, postnasal drip, rhinorrhea, sinus pressure, sinus pain, sneezing, sore throat, tinnitus, trouble swallowing and voice change.    Eyes:  Negative for photophobia, pain, discharge, redness, itching and visual disturbance.   Respiratory:  Negative for apnea, cough, choking, chest tightness, shortness of breath, wheezing and stridor.    Cardiovascular:  Negative for chest pain, palpitations and leg swelling.   Gastrointestinal:  Negative for abdominal distention, abdominal pain, anal bleeding, blood in stool, constipation, diarrhea, nausea, rectal pain and vomiting.   Endocrine: Negative for cold intolerance, heat intolerance, polydipsia, polyphagia and polyuria.   Genitourinary:  Negative for decreased urine volume, difficulty urinating, dyspareunia, dysuria, enuresis, flank pain, frequency, genital sores, hematuria, menstrual problem, pelvic pain, urgency, vaginal bleeding, vaginal discharge and vaginal pain.   Musculoskeletal:  Positive for arthralgias, neck pain and neck stiffness. Negative for back pain, gait problem, joint swelling and myalgias.   Skin:  Negative for color change, pallor, rash and wound.   Allergic/Immunologic: Negative for environmental allergies, food allergies and immunocompromised state.   Neurological:  Positive for numbness. Negative for dizziness, tremors, seizures, syncope, facial asymmetry, speech difficulty, weakness, light-headedness and headaches.   Hematological:   Negative for adenopathy. Does not bruise/bleed easily.   Psychiatric/Behavioral:  Negative for agitation, behavioral problems, confusion, decreased concentration, dysphoric mood, hallucinations, self-injury, sleep disturbance and suicidal ideas. The patient is not nervous/anxious and is not hyperactive.    All other systems reviewed and are negative.     I have reviewed this note template and all pertinent parts of the review of systems social, family history, surgical history and medication list    Physical Examination:  Vitals:    02/24/25 1540   Temp: 97.9 °F (36.6 °C)      General Appearance:   Well developed, well nourished, well groomed, alert, and cooperative.  Neurological examination:  Neurological Exam   Vital signs were reviewed and documented in the chart  Patient appeared in good neurologic function with normal comprehension fluent speech  Mood and affect are normal  Sense of smell deferred    Cranial nerves intact    She is very lean today    Angular cheilitis noted    She has a sniffing posture of her neck    Neck incisions well-healed        Muscle bulk and tone normal  5 out of 5 strength no motor drift she has effort dependent right sided shoulder movement which is baseline for her and guarding from almost a CPRS type of picture  Gait normal intact  Negative Romberg  No clonus long tract signs or myelopathy    Reflexes slightly hyperreflexic upper extremities  No edema noted and extremities skin appears normal  Arms are warm well-perfused      Review of Imaging/DATA:  Scans were reviewed personally she is got progressive collapse at C4 556 and 6 7 compared to scans as far back as 2016 she has no evidence of high-grade impingement but from a kyphotic angulation standpoint it is pretty significant  Diagnoses/Plan:    Ms. Araya is a 50 y.o. female   1.  History of complex regional pain    2.  Status post spinal cord stimulator with some degree of reduction in its efficacy however more onset of axial  neck pain    3.  Progressive disc base collapse C4-5 C5-6 and C6-7 with compensatory hyperextension at the above level of her spinal stimulator  4.  Osteopenia  5.  Anxiety  6.  Suspected malnutrition  I explained the risk benefits and expected outcome of major elective surgery for their problem, complications from approach, and infection, the risk of neurologic implications after surgery as well as need for repeat surgeries and most importantly failure to achieve quality of life improvement from the surgery to the patient.  I talked her about the risk of surgery the question would be whether or not a 3 level ACDF might improve her sagittal balance and alleviate some of her neck pain I explained the very complicated nature of axial spinal neck pain surgery is variable results especially given higher risk features for her than standard but it is something consider    After extensive discussion shared decision making plan will be    1.  I like her to seek second opinion of talk personally with Dr. Kyrie Pedersen if he is available    2.  Work on nutritional status    3.  PT right shoulder    4.  Follow-up with     Will make arrangements for follow-up accordingly

## 2025-02-24 NOTE — TELEPHONE ENCOUNTER
[employer] reps will be here today around 03:30 to check patient's device. Patient notified and thankful for the call back. Closing TE.

## 2025-02-24 NOTE — TELEPHONE ENCOUNTER
Caller: Nettie Araya    Relationship: Self    Best call back number: [phone]    What is the best time to reach you: any     Who are you requesting to speak with (clinical staff, provider,  specific staff member): clinical     Do you know the name of the person who called: yes     What was the call regarding: patient states the stimulator is not working- and she has an appt with  02/24/2025 @3:30 would like to see if [employer] can come to consult in office

## 2025-02-27 ENCOUNTER — HOSPITAL ENCOUNTER (INPATIENT)
Dept: HOSPITAL 22 - ER | Age: 50
LOS: 1 days | Discharge: HOME | DRG: 641 | End: 2025-02-28
Payer: MEDICARE

## 2025-02-27 ENCOUNTER — HOSPITAL ENCOUNTER (OUTPATIENT)
Dept: HOSPITAL 22 - INF | Age: 50
Discharge: HOME | End: 2025-02-27
Payer: MEDICARE

## 2025-02-27 VITALS
SYSTOLIC BLOOD PRESSURE: 113 MMHG | RESPIRATION RATE: 16 BRPM | HEART RATE: 84 BPM | DIASTOLIC BLOOD PRESSURE: 80 MMHG | OXYGEN SATURATION: 100 %

## 2025-02-27 VITALS
SYSTOLIC BLOOD PRESSURE: 149 MMHG | TEMPERATURE: 97.88 F | DIASTOLIC BLOOD PRESSURE: 57 MMHG | OXYGEN SATURATION: 100 % | HEART RATE: 75 BPM | RESPIRATION RATE: 18 BRPM

## 2025-02-27 VITALS
DIASTOLIC BLOOD PRESSURE: 65 MMHG | TEMPERATURE: 98.06 F | HEART RATE: 80 BPM | RESPIRATION RATE: 16 BRPM | SYSTOLIC BLOOD PRESSURE: 110 MMHG | OXYGEN SATURATION: 99 %

## 2025-02-27 VITALS
DIASTOLIC BLOOD PRESSURE: 63 MMHG | HEART RATE: 83 BPM | OXYGEN SATURATION: 99 % | RESPIRATION RATE: 16 BRPM | SYSTOLIC BLOOD PRESSURE: 149 MMHG

## 2025-02-27 VITALS
SYSTOLIC BLOOD PRESSURE: 130 MMHG | HEART RATE: 85 BPM | DIASTOLIC BLOOD PRESSURE: 69 MMHG | OXYGEN SATURATION: 99 % | RESPIRATION RATE: 18 BRPM

## 2025-02-27 VITALS
HEART RATE: 89 BPM | SYSTOLIC BLOOD PRESSURE: 133 MMHG | DIASTOLIC BLOOD PRESSURE: 64 MMHG | RESPIRATION RATE: 16 BRPM | OXYGEN SATURATION: 99 %

## 2025-02-27 VITALS
SYSTOLIC BLOOD PRESSURE: 139 MMHG | DIASTOLIC BLOOD PRESSURE: 76 MMHG | RESPIRATION RATE: 14 BRPM | OXYGEN SATURATION: 100 % | HEART RATE: 88 BPM

## 2025-02-27 VITALS
TEMPERATURE: 98 F | DIASTOLIC BLOOD PRESSURE: 64 MMHG | SYSTOLIC BLOOD PRESSURE: 115 MMHG | HEART RATE: 66 BPM | RESPIRATION RATE: 16 BRPM | OXYGEN SATURATION: 94 %

## 2025-02-27 VITALS — BODY MASS INDEX: 20.1 KG/M2 | BODY MASS INDEX: 20.4 KG/M2 | BODY MASS INDEX: 21.2 KG/M2

## 2025-02-27 VITALS
DIASTOLIC BLOOD PRESSURE: 68 MMHG | OXYGEN SATURATION: 98 % | RESPIRATION RATE: 18 BRPM | SYSTOLIC BLOOD PRESSURE: 114 MMHG | HEART RATE: 79 BPM

## 2025-02-27 VITALS
HEART RATE: 79 BPM | OXYGEN SATURATION: 98 % | TEMPERATURE: 98.06 F | SYSTOLIC BLOOD PRESSURE: 124 MMHG | DIASTOLIC BLOOD PRESSURE: 69 MMHG | RESPIRATION RATE: 18 BRPM

## 2025-02-27 VITALS
RESPIRATION RATE: 18 BRPM | SYSTOLIC BLOOD PRESSURE: 104 MMHG | TEMPERATURE: 98.3 F | HEART RATE: 82 BPM | DIASTOLIC BLOOD PRESSURE: 50 MMHG | OXYGEN SATURATION: 100 %

## 2025-02-27 VITALS — BODY MASS INDEX: 20.1 KG/M2

## 2025-02-27 VITALS
RESPIRATION RATE: 16 BRPM | TEMPERATURE: 98 F | HEART RATE: 78 BPM | OXYGEN SATURATION: 99 % | SYSTOLIC BLOOD PRESSURE: 110 MMHG | DIASTOLIC BLOOD PRESSURE: 54 MMHG

## 2025-02-27 VITALS — HEART RATE: 75 BPM

## 2025-02-27 DIAGNOSIS — G43.909: ICD-10-CM

## 2025-02-27 DIAGNOSIS — G62.9: ICD-10-CM

## 2025-02-27 DIAGNOSIS — Z79.899: ICD-10-CM

## 2025-02-27 DIAGNOSIS — Z91.81: ICD-10-CM

## 2025-02-27 DIAGNOSIS — R29.6: ICD-10-CM

## 2025-02-27 DIAGNOSIS — K21.9: ICD-10-CM

## 2025-02-27 DIAGNOSIS — F32.A: ICD-10-CM

## 2025-02-27 DIAGNOSIS — I10: ICD-10-CM

## 2025-02-27 DIAGNOSIS — G47.9: ICD-10-CM

## 2025-02-27 DIAGNOSIS — E87.6: Primary | ICD-10-CM

## 2025-02-27 DIAGNOSIS — Z81.8: ICD-10-CM

## 2025-02-27 DIAGNOSIS — E87.1: ICD-10-CM

## 2025-02-27 DIAGNOSIS — Z83.3: ICD-10-CM

## 2025-02-27 DIAGNOSIS — E83.39: ICD-10-CM

## 2025-02-27 DIAGNOSIS — F39: ICD-10-CM

## 2025-02-27 DIAGNOSIS — Z80.9: ICD-10-CM

## 2025-02-27 DIAGNOSIS — Z79.890: ICD-10-CM

## 2025-02-27 DIAGNOSIS — F41.9: ICD-10-CM

## 2025-02-27 LAB
ALBUMIN LEVEL: 4.1 G/DL (ref 3.5–5)
ALBUMIN/GLOB SERPL: 1.6 {RATIO} (ref 1.1–1.8)
ALP ISO SERPL-ACNC: 54 U/L (ref 38–126)
ALT SERPLBLD-CCNC: 19 U/L (ref 12–78)
ANION GAP SERPL CALC-SCNC: 7.3 MEQ/L (ref 5–15)
ANION GAP SERPL CALC-SCNC: 9.2 MEQ/L (ref 5–15)
AST SERPL QL: 26 U/L (ref 14–36)
BILIRUBIN,TOTAL: 0.4 MG/DL (ref 0.2–1.3)
BUN SERPL-MCNC: 11 MG/DL (ref 7–17)
BUN SERPL-MCNC: 13 MG/DL (ref 7–17)
CALCIUM SPEC-MCNC: 8 MG/DL (ref 8.4–10.2)
CALCIUM SPEC-MCNC: 8.8 MG/DL (ref 8.4–10.2)
CHLORIDE SPEC-SCNC: 100 MMOL/L (ref 98–107)
CHLORIDE SPEC-SCNC: 92 MMOL/L (ref 98–107)
CO2 SERPL-SCNC: 28 MMOL/L (ref 22–30)
CO2 SERPL-SCNC: 34 MMOL/L (ref 22–30)
CREAT BLD-SCNC: 1.1 MG/DL (ref 0.52–1.04)
CREAT BLD-SCNC: 1.1 MG/DL (ref 0.52–1.04)
CREATININE CLEARANCE ESTIMATED: 54 ML/MIN (ref 50–200)
CREATININE CLEARANCE ESTIMATED: 55 ML/MIN (ref 50–200)
ESTIMATED GLOMERULAR FILT RATE: 52 ML/MIN (ref 60–?)
ESTIMATED GLOMERULAR FILT RATE: 52 ML/MIN (ref 60–?)
GFR (AFRICAN AMERICAN): 63 ML/MIN (ref 60–?)
GFR (AFRICAN AMERICAN): 63 ML/MIN (ref 60–?)
GLOBULIN SER CALC-MCNC: 2.5 G/DL (ref 1.3–3.2)
GLUCOSE: 124 MG/DL (ref 74–100)
GLUCOSE: 136 MG/DL (ref 74–100)
HCT VFR BLD CALC: 35.6 % (ref 37–47)
HGB BLD-MCNC: 11.1 G/DL (ref 12.2–16.2)
MAGNESIUM: 2 MG/DL (ref 1.6–2.3)
MCHC RBC-ENTMCNC: 31.2 G/DL (ref 31.8–35.4)
MCV RBC: 75.9 FL (ref 81–99)
MEAN CORPUSCULAR HEMOGLOBIN: 23.7 PG (ref 27–31.2)
PHOSPHOROUS: 2.3 MG/DL (ref 2.5–4.5)
PLATELET # BLD: 378 K/MM3 (ref 142–424)
POTASSIUM: 1.6 MMOL/L (ref 3.5–5.1)
POTASSIUM: 2.2 MMOL/L (ref 3.5–5.1)
POTASSIUM: 2.3 MMOL/L (ref 3.5–5.1)
PROT SERPL-MCNC: 6.6 G/DL (ref 6.3–8.2)
RBC # BLD AUTO: 4.69 M/MM3 (ref 4.2–5.4)
SODIUM SPEC-SCNC: 133 MMOL/L (ref 136–145)
SODIUM SPEC-SCNC: 133 MMOL/L (ref 136–145)
WBC # BLD AUTO: 7 K/MM3 (ref 4.8–10.8)

## 2025-02-27 PROCEDURE — 85025 COMPLETE CBC W/AUTO DIFF WBC: CPT

## 2025-02-27 PROCEDURE — 36415 COLL VENOUS BLD VENIPUNCTURE: CPT

## 2025-02-27 PROCEDURE — 82570 ASSAY OF URINE CREATININE: CPT

## 2025-02-27 PROCEDURE — 82436 ASSAY OF URINE CHLORIDE: CPT

## 2025-02-27 PROCEDURE — 93005 ELECTROCARDIOGRAM TRACING: CPT

## 2025-02-27 PROCEDURE — 80053 COMPREHEN METABOLIC PANEL: CPT

## 2025-02-27 PROCEDURE — 84133 ASSAY OF URINE POTASSIUM: CPT

## 2025-02-27 PROCEDURE — 96360 HYDRATION IV INFUSION INIT: CPT

## 2025-02-27 PROCEDURE — 80048 BASIC METABOLIC PNL TOTAL CA: CPT

## 2025-02-27 PROCEDURE — 84132 ASSAY OF SERUM POTASSIUM: CPT

## 2025-02-27 PROCEDURE — 96361 HYDRATE IV INFUSION ADD-ON: CPT

## 2025-02-27 PROCEDURE — 84100 ASSAY OF PHOSPHORUS: CPT

## 2025-02-27 PROCEDURE — 99291 CRITICAL CARE FIRST HOUR: CPT

## 2025-02-27 PROCEDURE — 83735 ASSAY OF MAGNESIUM: CPT

## 2025-02-27 PROCEDURE — 84540 ASSAY OF URINE/UREA-N: CPT

## 2025-02-27 RX ADMIN — SODIUM CHLORIDE AND POTASSIUM CHLORIDE 250 ML: 9; 2.98 INJECTION, SOLUTION INTRAVENOUS at 12:23

## 2025-02-27 RX ADMIN — Medication 10 ML: at 12:23

## 2025-02-27 RX ADMIN — SODIUM CHLORIDE, SODIUM LACTATE, POTASSIUM CHLORIDE, AND CALCIUM CHLORIDE 999 ML: 600; 310; 30; 20 INJECTION, SOLUTION INTRAVENOUS at 14:58

## 2025-02-27 RX ADMIN — POTASSIUM CHLORIDE 60 MEQ: 1500 TABLET, EXTENDED RELEASE ORAL at 23:23

## 2025-02-27 RX ADMIN — SODIUM CHLORIDE 50 ML: 900 INJECTION, SOLUTION INTRAVENOUS at 12:23

## 2025-02-27 RX ADMIN — MAGNESIUM SULFATE HEPTAHYDRATE 50 GM: 2 INJECTION, SOLUTION INTRAVENOUS at 14:58

## 2025-02-27 RX ADMIN — POTASSIUM CHLORIDE 40 MEQ: 1500 TABLET, EXTENDED RELEASE ORAL at 14:58

## 2025-02-27 RX ADMIN — POTASSIUM CHLORIDE 40 MEQ: 1500 TABLET, EXTENDED RELEASE ORAL at 20:58

## 2025-02-28 VITALS
OXYGEN SATURATION: 98 % | RESPIRATION RATE: 16 BRPM | SYSTOLIC BLOOD PRESSURE: 105 MMHG | HEART RATE: 70 BPM | TEMPERATURE: 98 F | DIASTOLIC BLOOD PRESSURE: 59 MMHG

## 2025-02-28 VITALS
TEMPERATURE: 98.06 F | DIASTOLIC BLOOD PRESSURE: 54 MMHG | OXYGEN SATURATION: 98 % | SYSTOLIC BLOOD PRESSURE: 93 MMHG | RESPIRATION RATE: 18 BRPM | HEART RATE: 73 BPM

## 2025-02-28 VITALS
SYSTOLIC BLOOD PRESSURE: 95 MMHG | OXYGEN SATURATION: 98 % | TEMPERATURE: 98.4 F | DIASTOLIC BLOOD PRESSURE: 52 MMHG | HEART RATE: 75 BPM | RESPIRATION RATE: 18 BRPM

## 2025-02-28 VITALS — HEART RATE: 80 BPM

## 2025-02-28 LAB
ALBUMIN LEVEL: 3.1 G/DL (ref 3.5–5)
ALBUMIN/GLOB SERPL: 1.5 {RATIO} (ref 1.1–1.8)
ALP ISO SERPL-ACNC: 50 U/L (ref 38–126)
ALT SERPLBLD-CCNC: 12 U/L (ref 12–78)
ANION GAP SERPL CALC-SCNC: 6.6 MEQ/L (ref 5–15)
ANION GAP SERPL CALC-SCNC: 7.6 MEQ/L (ref 5–15)
AST SERPL QL: 19 U/L (ref 14–36)
BILIRUBIN,TOTAL: < 0.1 MG/DL (ref 0.2–1.3)
BUN SERPL-MCNC: 10 MG/DL (ref 7–17)
BUN SERPL-MCNC: 12 MG/DL (ref 7–17)
CALCIUM SPEC-MCNC: 7.9 MG/DL (ref 8.4–10.2)
CALCIUM SPEC-MCNC: 8.3 MG/DL (ref 8.4–10.2)
CHLORIDE SPEC-SCNC: 104 MMOL/L (ref 98–107)
CHLORIDE SPEC-SCNC: 105 MMOL/L (ref 98–107)
CO2 SERPL-SCNC: 28 MMOL/L (ref 22–30)
CO2 SERPL-SCNC: 29 MMOL/L (ref 22–30)
CREAT BLD-SCNC: 1 MG/DL (ref 0.52–1.04)
CREAT BLD-SCNC: 1.2 MG/DL (ref 0.52–1.04)
CREATININE CLEARANCE ESTIMATED: 53 ML/MIN (ref 50–200)
CREATININE CLEARANCE ESTIMATED: 63 ML/MIN (ref 50–200)
ESTIMATED GLOMERULAR FILT RATE: 47 ML/MIN (ref 60–?)
ESTIMATED GLOMERULAR FILT RATE: 58 ML/MIN (ref 60–?)
GFR (AFRICAN AMERICAN): 57 ML/MIN (ref 60–?)
GFR (AFRICAN AMERICAN): 71 ML/MIN (ref 60–?)
GLOBULIN SER CALC-MCNC: 2.1 G/DL (ref 1.3–3.2)
GLUCOSE: 90 MG/DL (ref 74–100)
GLUCOSE: 97 MG/DL (ref 74–100)
HCT VFR BLD CALC: 30.3 % (ref 37–47)
HGB BLD-MCNC: 9.4 G/DL (ref 12.2–16.2)
MAGNESIUM: 2.2 MG/DL (ref 1.6–2.3)
MCHC RBC-ENTMCNC: 31 G/DL (ref 31.8–35.4)
MCV RBC: 75.8 FL (ref 81–99)
MEAN CORPUSCULAR HEMOGLOBIN: 23.5 PG (ref 27–31.2)
PHOSPHOROUS: 2.6 MG/DL (ref 2.5–4.5)
PLATELET # BLD: 310 K/MM3 (ref 142–424)
POTASSIUM: 2.6 MMOL/L (ref 3.5–5.1)
POTASSIUM: 3.6 MMOL/L (ref 3.5–5.1)
PROT SERPL-MCNC: 5.2 G/DL (ref 6.3–8.2)
RBC # BLD AUTO: 4 M/MM3 (ref 4.2–5.4)
SODIUM SPEC-SCNC: 137 MMOL/L (ref 136–145)
SODIUM SPEC-SCNC: 137 MMOL/L (ref 136–145)
SODIUM,URINE RANDOM: 108 MMOL/L (ref 30–90)
WBC # BLD AUTO: 4.7 K/MM3 (ref 4.8–10.8)

## 2025-02-28 RX ADMIN — POTASSIUM CHLORIDE 40 MEQ: 1500 TABLET, EXTENDED RELEASE ORAL at 13:08

## 2025-02-28 RX ADMIN — GABAPENTIN 200 MG: 100 CAPSULE ORAL at 09:36

## 2025-02-28 RX ADMIN — GABAPENTIN 200 MG: 100 CAPSULE ORAL at 13:08

## 2025-02-28 RX ADMIN — POTASSIUM CHLORIDE 40 MEQ: 1500 TABLET, EXTENDED RELEASE ORAL at 09:36

## 2025-03-01 LAB
CHLORIDE, URINE: 167 MMOL/L
POTASSIUM, URINE: > 100 MMOL/L

## 2025-03-10 RX ORDER — ESTRADIOL AND NORETHINDRONE ACETATE 1; .5 MG/1; MG/1
1 TABLET ORAL DAILY
Qty: 28 TABLET | Refills: 2 | Status: SHIPPED | OUTPATIENT
Start: 2025-03-10

## 2025-03-11 ENCOUNTER — HOSPITAL ENCOUNTER (OUTPATIENT)
Dept: HOSPITAL 22 - LAB | Age: 50
Discharge: HOME | End: 2025-03-11
Payer: MEDICARE

## 2025-03-11 DIAGNOSIS — E87.6: Primary | ICD-10-CM

## 2025-03-11 LAB — SODIUM,URINE RANDOM: 35 MMOL/L (ref 30–90)

## 2025-03-11 PROCEDURE — 84540 ASSAY OF URINE/UREA-N: CPT

## 2025-03-11 PROCEDURE — 84133 ASSAY OF URINE POTASSIUM: CPT

## 2025-03-12 ENCOUNTER — HOSPITAL ENCOUNTER (OUTPATIENT)
Dept: HOSPITAL 22 - LAB | Age: 50
Discharge: HOME | End: 2025-03-12
Payer: MEDICARE

## 2025-03-12 DIAGNOSIS — E87.6: Primary | ICD-10-CM

## 2025-03-12 DIAGNOSIS — G90.511 COMPLEX REGIONAL PAIN SYNDROME TYPE 1 OF RIGHT UPPER EXTREMITY: ICD-10-CM

## 2025-03-12 LAB — POTASSIUM, URINE: 61.5 MMOL/L

## 2025-03-12 PROCEDURE — 36415 COLL VENOUS BLD VENIPUNCTURE: CPT

## 2025-03-12 PROCEDURE — 82533 TOTAL CORTISOL: CPT

## 2025-03-13 RX ORDER — NORTRIPTYLINE HYDROCHLORIDE 10 MG/1
CAPSULE ORAL
Qty: 60 CAPSULE | Refills: 5 | Status: SHIPPED | OUTPATIENT
Start: 2025-03-13

## 2025-03-21 ENCOUNTER — HOSPITAL ENCOUNTER (OUTPATIENT)
Dept: HOSPITAL 22 - RAD | Age: 50
Discharge: HOME | End: 2025-03-21
Payer: MEDICARE

## 2025-03-21 DIAGNOSIS — E87.6: Primary | ICD-10-CM

## 2025-03-21 PROCEDURE — 76770 US EXAM ABDO BACK WALL COMP: CPT

## 2025-03-26 ENCOUNTER — TELEPHONE (OUTPATIENT)
Dept: PAIN MEDICINE | Facility: CLINIC | Age: 50
End: 2025-03-26
Payer: MEDICARE

## 2025-03-27 ENCOUNTER — OFFICE VISIT (OUTPATIENT)
Dept: PAIN MEDICINE | Facility: CLINIC | Age: 50
End: 2025-03-27
Payer: OTHER MISCELLANEOUS

## 2025-03-27 VITALS — WEIGHT: 119 LBS | BODY MASS INDEX: 19.83 KG/M2 | HEIGHT: 65 IN

## 2025-03-27 DIAGNOSIS — Z96.82 PRESENCE OF NEUROSTIMULATOR: ICD-10-CM

## 2025-03-27 DIAGNOSIS — R53.81 PHYSICAL DECONDITIONING: ICD-10-CM

## 2025-03-27 DIAGNOSIS — M99.41 CONNECTIVE TISSUE STENOSIS OF NEURAL CANAL OF CERVICAL REGION: ICD-10-CM

## 2025-03-27 DIAGNOSIS — M75.01 ADHESIVE CAPSULITIS OF RIGHT SHOULDER: ICD-10-CM

## 2025-03-27 DIAGNOSIS — Z46.2 ENCOUNTER FOR FITTING AND ADJUSTMENT OF NEUROPACEMAKER OF SPINAL CORD: ICD-10-CM

## 2025-03-27 DIAGNOSIS — M79.18 MYOFASCIAL PAIN: ICD-10-CM

## 2025-03-27 DIAGNOSIS — G90.511 COMPLEX REGIONAL PAIN SYNDROME TYPE 1 OF RIGHT UPPER EXTREMITY: ICD-10-CM

## 2025-03-27 DIAGNOSIS — M96.1 POSTLAMINECTOMY SYNDROME, CERVICAL REGION: ICD-10-CM

## 2025-03-27 DIAGNOSIS — F32.A DEPRESSION, UNSPECIFIED DEPRESSION TYPE: ICD-10-CM

## 2025-03-27 DIAGNOSIS — G56.81 SUPRASCAPULAR ENTRAPMENT NEUROPATHY OF RIGHT SIDE: ICD-10-CM

## 2025-03-27 DIAGNOSIS — M47.812 CERVICAL SPONDYLOSIS WITHOUT MYELOPATHY: ICD-10-CM

## 2025-03-27 PROCEDURE — 95972 ALYS CPLX SP/PN NPGT W/PRGRM: CPT | Performed by: ANESTHESIOLOGY

## 2025-03-27 PROCEDURE — 99214 OFFICE O/P EST MOD 30 MIN: CPT | Performed by: ANESTHESIOLOGY

## 2025-03-27 RX ORDER — BACLOFEN 10 MG/1
10 TABLET ORAL 4 TIMES DAILY
Qty: 40 TABLET | Refills: 5 | Status: SHIPPED | OUTPATIENT
Start: 2025-03-27

## 2025-03-27 RX ORDER — DULOXETIN HYDROCHLORIDE 60 MG/1
60 CAPSULE, DELAYED RELEASE ORAL DAILY
Qty: 90 CAPSULE | Refills: 5 | Status: SHIPPED | OUTPATIENT
Start: 2025-03-27

## 2025-03-27 NOTE — PROGRESS NOTES
"Chief Complaint: \"Neck pain. My stimulator is working well. Medication refill. \"       History of Present Illness: Ms. Nettie Araya, 50 y.o. female was last seen by Corrina in August 2024 for follow-up evaluation and medication refill. Nettie Araya returns to the clinic for evaluation of her chronic pain, possible spinal cord stimulator reprogramming, random drug screening, and medication refill. She underwent initial implantation of her spinal cord stimulator device on October 16, 2018, with Dr. Jam Pack. She underwent revision of the mgMEDIAtronic cervical spinal cord stimulator lead with extension to the bilateral C4 laminar space with Dr. Jam Pack on 4/28/2021. Since revision surgery she has been doing very well, and reports improvement in her hyperesthesia, hyperalgesia and allodynia in her arms, due to her CRPS which was the reason for her initial placement. She continues complaining of axial mechanical neck pain that has increased in severity for the past years. On February 6, 2023, she underwent bilateral cervical medial branch rhizotomies at C4, C5, and C6, with significant improvement in her neck pain. MRI of the cervical spine demonstrates multilevel facet arthritis, and disc osteophyte complex formations.  At C4-C5: mild to moderate central spinal stenosis, at C5-C6 and C6-C7 there are disc osteophyte complex formations with severe central spinal stenosis and moderate to severe bilateral neuroforaminal stenosis. Corrina referred her back for neurosurgical consultation due to her ongoing symptoms and failure to achieve relief with spinal cord stimulation. She has a history significant for complex regional pain syndrome and progressive disc base collapse at C4-C5, C5-C6 and C6-C7 with compensatory hyperextension at the above level of her spinal stimulator and with associated stenosis and facet arthropathy. Osteopenia.  She recently saw Dr. Pack on 02/24/2025, and discussed the " "possibility of surgical intervention and second opinion, although his opinion was that the complex nature of her spine issues could be quite difficult for surgical correction. He explained that she would need a 3 level ACDF to improve sagittal balance and alleviate her neck pain. He also explained the complicated nature of axial spinal neck pain surgery with variable results. He referred her to physical therapy and for second opinion with Dr Kyrie Pedersen.    Posterior neck and right shoulder pain  Radiation of pain: The pain radiates into the RT shoulder.   Her hyperesthesia, hyperalgesia and allodynia in her arms have been significantly improved since revision of her SCS device and reprogramming   Pain intensity today: 7/10 with stimulator \"turned on\"    Average pain intensity last week: 7/10  Pain intensity ranges from: 5/10 to 9/10  Aggravating factors: Pain increases with flexion, rotation, and extension of the cervical spine, with movement of her upper extremities involving her shoulders..   Alleviating factors: Pain decreases with analgesics and rest, and \"my spinal cord stimulator device\".   Associated symptoms:   Patient reports numbness and weakness in the right upper extremity. She denies pain.   Patient denies  any new bladder or bowel problems.   Patient reports difficulties with her balance but denies recent falls.     Pain History:   Ms. Nettie Araya, 44 y.o. female has a long standing history of complex regional pain syndrome type 1 of the right shoulder as a result of a work related injury occurring in September 2009.  She underwent explantation of spinal cord stimulator device inserted by Dr. Kaveh Randle on January 20, 2014 after it was discovered by X-rays in 2016 the lead on the left side of the posterior epidural space had migrated.  The patient had a history of multiple falls which could be the cause of the lead migration. On October 16, 2018 she underwent explantation of percutaneous SCS " device followed by implantation of a spinal cord stimulator device with Dr. Jam Pack with Medtronic. Medtronic Specify SureScan 2x8 MRI paddle (MRI full body in 1.5 Bee) with the top electrodes projecting at the level of the superior endplate of the C2 vertebral level. IPG: Medtronic Intellis with AdaptiveStim (MRI compatible full body). The device was implanted primarily for treatment of CRPS of the upper extremities.     Review of previous therapies and additional medical records:  Nettie Araya has already failed the following measures, including:   Conservative measures: oral analgesics, opioids, physical therapy, chiropractic therapy and TENs   Interventional measures: SGBs, SCS originally implanted in 1/2014 with Medtronic RestoreSensor MRI compatible Sure Scan with two leads with the top electrodes projecting at the level of the superior endplate of C3.   04/17/2017: bilateral cervical RFTC  02/06/2023: Bilateral cervical RFA  Surgical measures: SCS implant revision as referenced above  She did undergo neurosurgical consultation on 7/13/2023 with Arthur Cornejo PA-C, he discussed the possibility of getting an opinion with an adult deformity specialist secondary to the high degree of spinal listhesis as well as kyphosis in her cervical spine.  She recently did see Dr. Pack on August 1, 2024, and he did discuss the possibility of surgical intervention, although with the complex nature of her spine, this could be quite difficult.    Nettie Araya presents with significant comorbidities including anxiety and depression,  engaged in treatment., CRPS, and migraine headaches engaged in treatment.   In terms of current analgesics, Nettie Araya takes: Gralise 600 mg at bedtime, nortriptyline 10-20 mg qhs., Diclofenac, tizanidine 2 mg prn. Patient also takes Ambien, Ativan, Cymbalta, and Wellbutrin. Patient denies any side effects from her medications.   I have reviewed the [place] Report is  consistent to medication reconciliation.    Little interest or pleasure in doing things? More than half the days   Feeling down, depressed, or hopeless? Not at all   PHQ-2 Total Score 2   Trouble falling or staying asleep, or sleeping too much? Several days   Feeling tired or having little energy? Several days   Poor appetite or overeating? More than half the days   Feeling bad about yourself - or that you are a failure or have let yourself or your family down? Not at all   Trouble concentrating on things, such as reading the newspaper or watching television? Not at all   Moving or speaking so slowly that other people could have noticed? Or the opposite - being so fidgety or restless that you have been moving around a lot more than usual? Not at all     Thoughts that you would be better off dead, or of hurting yourself in some way? Not at all   PHQ-9 Total Score 6   If you checked off any problems, how difficult have these problems made it for you to do your work, take care of things at home, or get along with other people? Somewhat difficult         PSQ: 1/60    Global Pain Scale 11-01  2018 05-22  2019 11-13  2019 06-15  2020 02-24  2021 11-16  2021 07-20  2022 08-16  2023 02-08  2024 03-27  2025    Pain 8 15 17 11 16 11 10 12 12 17    Feelings 10 13 17 10 26 19 13 10 11 11    Clinical outcomes 15 19 20 13 20 14 13 11 15 20    Activities 10 10 11 11 4 17 8 14 15 22    GPS Total: 43 57 65 45 66 61 43 47 53 70        NECK PAIN DISABILITY INDEX QUESTIONNAIRE  DATE 08-16 2023 02-08 2024 03-27 2025         Pain intensity  0: No pain  1: Mild  2: Moderate  3: Fairly severe  4: very severe  5: Worst imaginable 3 3 3         Personal Care   0: I can look after myself normally without causing extra pain.  1: I can look after myself normally, but it causes extra pain.  2: It is painful to look after myself and I am slow and careful.  3: I need some help, but manage most of my personal care.  4: I need help every day in  most aspects of self-care.  5: I do not get dressed; I wash with difficulty and stay in bed. 3  2 4         Lifting  0: I can lift heavy weights without extra pain.  1: I can lift heavy weights, but it gives extra pain.  2: Pain prevents me from lifting heavy weights off the floor but I can manage if they are conveniently positioned, for example, on a table.  3: Pain prevents me from lifting heavy weights, but I can manage light to medium weights if they are conveniently positioned.  4: I can lift very light weights.  5: I cannot lift or carry anything at all. 4  4 4         Reading  0: I can read as much as I want to with no pain in my neck.  1: I can read as much as I want to with slight pain in my neck.  2: I can read as much as I want to with moderate pain in my neck.  3: I cannot read as much as I want because of moderate pain in my neck.  4: I cannot read as much as I want because of severe pain in my neck.  5: I cannot read at all. 4  2 4         Headaches  0: I have no headaches at all.  1: I have slight headaches which come infrequently.  2: I have moderate headaches which come infrequently.  3: I have moderate headaches which come frequently.  4: I have severe headaches which come frequently.  5: I have headaches almost all the time. 5  3 5         Concentration  0: I can concentrate fully when I want to with no difficulty.  1: I can concentrate fully when I want to with slight difficulty.  2: I have a fair degree of difficulty in concentrating when I want to.  3: I have a lot of difficulty in concentrating when I want to.  4: I have a great deal of difficulty in concentrating when I want to.  5: I cannot concentrate at all. 3  2 4         Work  0: I can do as much work as I want to.  1: I can only do my usual work, but no more.  2: I can do most of my usual work, but no more.  3: I cannot do my usual work.  4: I can hardly do any work at all.  5: I cannot do any work at all. 5  4 5         Driving  0: I  can drive my car without any neck pain.  1: I can drive my car as long as I want with slight pain in my neck.  2: I can drive my car as long as I want with moderate pain in my   neck.  3: I cannot drive my car as long as I want because of moderate pain   in my neck.  4: I can hardly drive at all because of severe pain in my neck.  5: I cannot drive my car at all. 4  3 4         Sleeping  0: I have no trouble sleeping.  1: My sleep is slightly disturbed (less than 1 hour sleepless).  2: My sleep is mildly disturbed (1-2 hours sleepless).  3: My sleep is moderately disturbed (2-3 hours sleepless).  4: My sleep is greatly disturbed (3-5 hours sleepless).  5: My sleep is completely disturbed (5-7 hours) 5  3 4         Recreation  0: I am able to engage in all of my recreational activities with no neck pain at all.  1: I am able to engage in all of my recreational activities with some pain in my neck.  2: I am able to engage in most, but not all of my recreational activities because of pain in my neck.  3: I am able to engage in a few of my recreational activities because of pain in my neck.  4: I can hardly do any recreational activities because of pain in my neck.  5: I cannot do any recreational activities at all. 5 3 5         TOTAL SCORE 43 33 42           Review of New Diagnostic Studies:  XR SPINE CERVICAL AP AND LAT W FLEX AND EXT on 2/24/2025. Vertebral body heights are maintained without evidence of acute fracture. There is redemonstrated reversal of the usual cervical lordosis in addition to mild anterolisthesis of C4 on C5 which does not appear dynamic. There is no evidence of dynamic listhesis in flexion and extension. Dorsal column stimulator leads noted terminating posterior to C2-C4. Multilevel spondylosis change is present, with areas of disc osteophyte complex formation and facet arthropathy, most notable at C5-6 and C6-7. The prevertebral soft tissues are normal.   MRI CERVICAL SPINE W WO CONTRAST on  07/29/2024. There is redemonstrated signal artifact correlating with dorsal column stimulator leads entering at the C4-5 interlaminar space, terminating posterior to C2-C4. Marrow signal appears maintained without evidence of fracture or new suspicious marrow replacing lesion. There is unchanged straightening and mild reversal of the usual cervical lordosis centered at C5, with some mild retrolisthesis of C5 on C6 and C6 on C7. No new focal cord signal abnormality is present. The paraspinal soft tissues demonstrate no acute or suspicious findings. There is no abnormal enhancement. Multilevel spondylosis is again noted, with areas of involvement including  C2-3, small disc osteophyte complex and bilateral facet arthropathy with associated mild to moderate spinal canal and symmetric bilateral neuroforaminal narrowing.  C3-4, disc osteophyte complex and bilateral facet arthropathy. There is mild to moderate spinal canal narrowing and minimal bilateral neuroforaminal stenosis.  C4-5, disc osteophyte complex and bilateral facet arthropathy. There is moderate spinal canal narrowing and mild bilateral neuroforaminal stenosis.  C5-6, disc osteophyte complex and bilateral facet arthropathy. There is severe spinal canal and bilateral neural foraminal narrowing present, greater on the left. Findings are not significantly changed from comparison.  C6-7, disc osteophyte complex and bilateral facet arthropathy. There is severe spinal canal and bilateral neuroforaminal narrowing present, greater on the left, unchanged from prior.  Impression: Redemonstrated advanced multilevel cervical spondylosis, including severe spinal canal and bilateral neuroforaminal narrowing at C5-6 and C6-7, not significantly changed from 2023 comparison.     Review of Previous Diagnostic Studies:  MRI of the cervical spine demonstrates multilevel facet arthritis, and disc osteophyte complex formations.  At C4-C5 there is mild to moderate central spinal  stenosis, at C5-C6 and C6-C7 there are disc osteophyte complex formations with severe central spinal stenosis and moderate to severe bilateral neuroforaminal stenosis.   Cervical spine x-rays with flexion-extension views demonstrate reversal of the normal cervical lordosis with anterior listhesis of C2 on C3, C3 on C4 and C4 on C5.  Multilevel moderate advanced disc disease most significant at C5-C6 and C6-C7.  No instability.  MRI of the cervical spine with and without contrast 5/16/2023: Postoperative changes from prior C4 laminectomy and spinal cord stimulator placement.  There is straightening and reversal of the normal cervical lordosis with minimal retrolisthesis of C5 on C6.  Spinal cord appears normal in caliber and signal.  C2-C3: Disc osteophyte complex with facet arthritis, no significant spinal canal or neuroforaminal stenosis.  C3-C4: Disc osteophyte complex with facet arthritis with mild central spinal stenosis and bilateral neuroforaminal stenosis.  C4-C5: Disc osteophyte complex with facet arthritis and mild to moderate central spinal stenosis with bilateral neuroforaminal stenosis.  C5-C6: Disc osteophyte complex with facet arthritis with severe central spinal stenosis and bilateral neuroforaminal stenosis which has worsened from previous studies.  C6-C7: Disc osteophyte complex with facet arthritis with severe central spinal stenosis and moderate to severe bilateral neuroforaminal stenosis.  Cervical spine x-rays 2/25/2021: I have reviewed the images.  Spinal cord similar leads are projecting posterior to the C2 and C3 lamina in the midline.  Multilevel spondylitic changes are present most advanced at C4-C5 and C5-C6, with minimal anterolisthesis of C4 on C5.  CT of the cervical spine without contrast 3/19/2021: I have reviewed the images.  Vertebral body heights are well-maintained with some straightening of the normal cervical lordosis.  There is minimal retrolisthesis of C5 on C6.  Spinal cord  stimulator leads are projecting posterior through the C2-C3 interlaminar space and out of the canal.  There are additionally multilevel spondylitic changes most likely advanced at C5-C6 with disc osteophyte complex formation.    Review of Systems   Musculoskeletal:  Positive for back pain, myalgias, neck pain and neck stiffness.   Neurological:  Positive for dizziness, numbness and headaches.   All other systems reviewed and are negative.        Patient Active Problem List   Diagnosis    Knee pain    Depression    Cervical facet arthropathy/cervical spondylosis without myelopathy    Atrophy, muscle disuse    Myofascial pain    Muscle spasm    Suprascapular entrapment neuropathy of right side    Frozen shoulder    Insomnia    Mild obesity    Chronic tension-type headache, not intractable    Physical deconditioning    Common migraine without aura    Adrenal nodule    Lung nodule, multiple    Encounter for fitting and adjustment of neuropacemaker of spinal cord    Complex regional pain syndrome type 1 of right upper extremity    CPRS 1 (complex regional pain syndrome I) of upper limb    Spinal cord stimulator dysfunction    Cervicalgia    Cervical spondylosis without myelopathy    Connective tissue stenosis of neural canal of cervical region    Postlaminectomy syndrome, cervical region    Presence of neurostimulator       Past Medical History:   Diagnosis Date    Abnormal MRI, shoulder     Right Shoulder MRI 12/2010 revealed small glenohumeral joint effusion, capsulitis, tendinitis. No rotator cuff for labral tear was noted.     Abnormal Pap smear of cervix     Abnormal x-ray     X-Ray of the right wrist on 04/30/2014, revealed linear sclerosis within the distal radial metaphysis consistent with healed fracture. There is irregularity of the ulnar styloid consistent with prior fracture.      Anemia     Anxiety     Chronic pain disorder ??    Depression     Extremity pain ?    Fractures ??    Headache ??    Headache,  tension-type ??    History of constipation     History of insomnia     Seconday to pain    History of migraine headaches     History of transfusion     History of varicose veins     HPV (human papilloma virus) infection     Low back pain     Migraine ??    Multiple gestation     Neck pain ??    Ovarian cyst     Pain in joint, shoulder region     PMS (premenstrual syndrome)     Raynaud's disease     Reflex sympathetic dystrophy ??    Urinary tract infection          Past Surgical History:   Procedure Laterality Date    BACK SURGERY      SCS implant    CERVICAL LAMINECTOMY DECOMPRESSION POSTERIOR Bilateral 10/16/2018    Procedure: PARTIAL C2, C3, C4 LAMINECTOMY SPINAL CORD STIMULATOR REMOVAL INSERTION OF SPINAL CORD STIMULATOR;  Surgeon: Jam Pack MD;  Location:  [facility];  Service: Neurosurgery     SECTION      x 3     SECTION WITH TUBAL  1    COLONOSCOPY      EPIDURAL BLOCK  ?    FRACTURE SURGERY      ORTHOPEDIC SURGERY  ??    OTHER SURGICAL HISTORY      Left Distal Radiolnar Joint Stabilization and Capsulodesis    ROTATOR CUFF REPAIR      Rotator cuff repair followed by three surgeries and manipulation under anesthesia with Dr. Ludwig. Fourth surgery performed by Dr. Graves.  Removal of suture and subacromial decompression with lysis of adhesions 2011.    SPINAL CORD STIMULATOR IMPLANT N/A 10/16/2018    Procedure: REMOVAL AND REPLACEMENT SPINAL CORD STIMULATOR;  Surgeon: Jam Pack MD;  Location:  [facility];  Service: Neurosurgery    SPINAL CORD STIMULATOR IMPLANT N/A 2021    Procedure: SPINAL CORD STIMULATOR REVISION CERVICAL;  Surgeon: Jam Pack MD;  Location:  [facility];  Service: Neurosurgery;  Laterality: N/A;    SPINE SURGERY  ?    THYROIDECTOMY, PARTIAL      TUBAL ABDOMINAL LIGATION      WRIST SURGERY           Family History   Problem Relation Age of Onset    Diabetes Mother     Hypertension Mother     Stroke Mother     Lymphoma Father     Lung cancer  Father     Brain cancer Father     Liver cancer Father     Cancer Father     COPD Brother     Multiple sclerosis Sister     No Known Problems Daughter     No Known Problems Daughter     No Known Problems Daughter     Heart disease Maternal Grandmother     Breast cancer Neg Hx     Ovarian cancer Neg Hx     Uterine cancer Neg Hx     Colon cancer Neg Hx          Social History     Socioeconomic History    Marital status:    Tobacco Use    Smoking status: Never     Passive exposure: Never    Smokeless tobacco: Never   Vaping Use    Vaping status: Never Used   Substance and Sexual Activity    Alcohol use: Never    Drug use: Never    Sexual activity: Yes     Partners: Male     Birth control/protection: Tubal ligation, Pill           Current Outpatient Medications:     acyclovir (ZOVIRAX) 400 MG tablet, Take 1 tablet by mouth 2 (Two) Times a Day. for 10 days, Disp: , Rfl:     Aimovig 140 MG/ML prefilled syringe, Inject 1 mL under the skin into the appropriate area as directed Every 30 (Thirty) Days., Disp: , Rfl:     amLODIPine (NORVASC) 2.5 MG tablet, Take 1 tablet by mouth Daily., Disp: , Rfl:     baclofen (LIORESAL) 10 MG tablet, Take 1 tablet by mouth 4 (Four) Times a Day., Disp: 40 tablet, Rfl: 5    buPROPion SR (WELLBUTRIN SR) 150 MG 12 hr tablet, Take 1 tablet by mouth Every 12 (Twelve) Hours., Disp: , Rfl: 0    chlorthalidone (HYGROTEN) 50 MG tablet, Take 1 tablet by mouth Daily., Disp: , Rfl:     diclofenac (VOLTAREN) 50 MG EC tablet, Take 1 tablet by mouth 2 (Two) Times a Day., Disp: 180 tablet, Rfl: 1    DULoxetine (CYMBALTA) 60 MG capsule, Take 1 capsule by mouth Daily., Disp: 90 capsule, Rfl: 5    estradiol-norethindrone (ACTIVELLA) 1-0.5 MG per tablet, Take 1 tablet by mouth once daily, Disp: 28 tablet, Rfl: 2    FeroSul 325 (65 Fe) MG tablet, Take 1 tablet by mouth Daily., Disp: , Rfl:     Gabapentin powder, , Disp: , Rfl:     gabapentin, once-daily, (Gralise) 600 MG tablet tablet, TAKE 1 TABLET BY  "MOUTH DAILY, Disp: 30 tablet, Rfl: 6    Gel Base gel, Apply 1 to 2 grams of pain gel to affected areas three to four times a day., Disp: 102 g, Rfl: PRN    LORazepam (ATIVAN) 1 MG tablet, 1 tablet., Disp: , Rfl:     nortriptyline (PAMELOR) 10 MG capsule, TAKE 1 CAPSULE BY MOUTH EVERY NIGHT. MAY TAKE 1 TO 2 CAPSULE AT NIGHT, Disp: 60 capsule, Rfl: 5    omeprazole (priLOSEC) 20 MG capsule, Take 1 capsule by mouth Daily., Disp: , Rfl:     ondansetron (ZOFRAN) 4 MG tablet, Take 1 tablet by mouth Every 8 (Eight) Hours As Needed for Nausea or Vomiting., Disp: , Rfl:     pantoprazole (PROTONIX) 40 MG EC tablet, Take 1 tablet by mouth Daily., Disp: , Rfl:     potassium chloride (KLOR-CON M20) 20 MEQ CR tablet, Take 2 tablets by mouth Every 12 (Twelve) Hours., Disp: , Rfl:     traZODone (DESYREL) 100 MG tablet, Take 1 tablet by mouth every night at bedtime., Disp: , Rfl:     ubrogepant 100 MG tablet, TAKE 1 TABLET BY MOUTH AT ONSET OF HEADACHE, Disp: , Rfl:     zolpidem (AMBIEN) 5 MG tablet, Take 1 tablet by mouth At Night As Needed., Disp: , Rfl:       No Known Allergies      Ht 165.1 cm (65\")   Wt 54 kg (119 lb)   BMI 19.80 kg/m²       Physical Exam:  Constitutional: Patient is oriented to person, place, and time.   Patient appears well-developed and well-nourished.   Head: Normocephalic and atraumatic. Eyes: Conjunctivae and lids are normal.   Pupils: Equal, round, reactive to light.   Neck: Trachea normal. Neck supple. No JVD present.   Lymphatic: No cervical adenopathy  Pulmonary Respiratory effort: No increased work of breathing or signs of respiratory distress.  Peripheral vascular exam: Normal.   Musculoskeletal   Gait and station: Gait evaluation demonstrated a normal gait   Cervical spine: Passive and active range of motion are significantly limited secondary to pain. Extension, flexion, lateral flexion, rotation of the cervical spine increased and reproduced pain. Cervical facet joint loading maneuvers are " positive.  Muscles: Presence of active trigger points levator scapulae.  Tenderness noted in the bilateral occipital and temporalis regions.  Shoulders: The range of motion of the glenohumeral joints is limited secondary to pain. Rotator cuff strength is left 5/5, RT 4+/5 overall. RT frozen shoulder with impingement   Neurological:   Patient is alert and oriented to person, place, and time.   Speech: speech is normal.   Cortical function: Normal mental status.   Cranial nerves: Cranial nerves 2-12 intact.   Reflex Scores:  Right brachioradialis:3+  Left brachioradialis: 3+  Right biceps: 3+  Left biceps: 3+  Right triceps: 3+  Left triceps: 3+  Right Patellar: 3+  Left Patellar:3+  Motor strength: 5/5, except right arm overall 4/5  Motor Tone: normal tone.   Involuntary movements: none.   Superficial/Primitive Reflexes: primitive reflexes were absent.   Right Downs: absent  Left Downs: absent  Right ankle clonus: absent  Left ankle clonus: absent   Babinsky: absent  Spurling sign is equivocal. Neck tornado test is negative. Lhermitte sign is negative. Negative long tract signs.   Sensation: No sensory loss. Sensory exam: intact to light touch, intact pain and temperature sensation, intact vibration sensation and normal proprioception. There is no hyperalgesia, hyperesthesia, or allodynia in the right forearm and right hand or the left shoulder and left armwith the use of her SCS device.  Coordination: Normal finger to nose and heel to shin. Normal balance and negative Romberg's sign   Skin and subcutaneous tissue: No rash noted. No cyanosis.   Psychiatric: Judgment and insight: Normal. Orientation to person, place and time: Normal. Recent and remote memory: Intact. Mood and affect: Normal.     PROCEDURE: Analysis of the spinal cord stimulator device with complex reprogramming  Analysis of the stimulator reveals that patient used the Medtronic spinal cord stimulator device average 100% of the time. Analysis of  impedence reveals normal impedence for all contacts. The spinal cord simulator device has 3 programs.      Program A1 (Preferred Program)  Electrode polarities: 0+,3-, 8+, 11-  Amplitude: 3.0 mA     Pulse width: 450 mcs  Rate: 50 Hz    Program A2  Electrode polarities:  0-, 9+  Amplitude:  2.0 mA  Pulse width 550 mcs  Rate:  50 Hz    Program A3  Electrode polarities:  8-, 15+  Amplitude:  2.5 mA  Pulse width 800 mcs  Rate:  100 Hz    Time spent reprogrammin Minutes    ASSESSMENT:   1. Cervical spondylosis without myelopathy    2. Adhesive capsulitis of right shoulder    3. 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 Postlaminectomy syndrome, cervical region    5. Complex regional pain syndrome type 1 of right upper extremity    6. Suprascapular entrapment neuropathy of right side    7. Myofascial pain    8. Physical deconditioning    9. Depression, unspecified depression type    10. Presence of neurostimulator    11. Encounter for fitting and adjustment of neuropacemaker of spinal cord        PLAN/MEDICAL DECISION MAKING: Ms. Nettie Araya, 50 y.o. female was last seen by Corrina in 2024 for follow-up evaluation and medication refill. Nettie Araya returns to the clinic for evaluation of her chronic pain, possible spinal cord stimulator reprogramming, random drug screening, and medication refill. She underwent initial implantation of her spinal cord stimulator device on 2018, with Dr. Jam Pack. She underwent revision of the Medtronic cervical spinal cord stimulator lead with extension to the bilateral C4 laminar space with Dr. Jam Pack on 2021. Since revision surgery she has been doing very well, and reports improvement in her hyperesthesia, hyperalgesia and allodynia in her arms, due to her CRPS which was the reason for her initial placement. She continues complaining of axial mechanical neck pain that has increased in severity for the past  years. On February 6, 2023, she underwent bilateral cervical medial branch rhizotomies at C4, C5, and C6, with significant improvement in her neck pain. MRI of the cervical spine demonstrates multilevel facet arthritis, and disc osteophyte complex formations.  At C4-C5: mild to moderate central spinal stenosis, at C5-C6 and C6-C7 there are disc osteophyte complex formations with severe central spinal stenosis and moderate to severe bilateral neuroforaminal stenosis. Corrina referred her back for neurosurgical consultation due to her ongoing symptoms and failure to achieve relief with spinal cord stimulation. She has a history significant for complex regional pain syndrome and progressive disc base collapse at C4-C5, C5-C6 and C6-C7 with compensatory hyperextension at the above level of her spinal stimulator and with associated stenosis and facet arthropathy. Osteopenia.  She recently saw Dr. Pack on 02/24/2025, and discussed the possibility of surgical intervention and second opinion, although his opinion was that the complex nature of her spine issues could be quite difficult for surgical correction. He explained that she would need a 3 level ACDF to improve sagittal balance and alleviate her neck pain. He also explained the complicated nature of axial spinal neck pain surgery with variable results. He referred her to physical therapy and for second opinion with Dr Kyrie Pedersen.   I had a lengthy conversation with Ms. Nettie Araya regarding her chronic pain condition and potential therapeutic options including risks, benefits, alternative therapies, to name a few.  I have reviewed all available patient's medical records as well as previous therapies as referenced above.  Therefore, I have proposed the following plan:    1. Follow up with me via Telehealth after her visit with Dr. Kyrie Pedersen, or in about 3 months. She may return for additional SCS reprogramming.  I have briefly discussed potential treatments for her  frozen shoulder, her recurrent axial mechanical cervicalgia, her cervical stenosis, to name a few.  Will withhold on interventional pain management measures until after she meets with Dr. Pedersen    2. Pharmacological measures: Reviewed and discussed. Patient also takes trazodone, Ativan, Cymbalta, Ambien, Aimovig and Wellbutrin. Patient denies any side effects from her medications.   A. Continue Gralise 600 mg with evening meals, refilled.  B. Continue nortriptyline 10-20 mg at bedtime, refilled.  C. Continue baclofen 10 mg 4 times daily as needed for muscle spasms, refilled.  D. Continue Lidocaine 10%, clonidine 0.1%, baclofen 5%, cyclobenzaprine 4%, amitriptyline 2%, gabapentin 10%, prilocaine 2% gel, apply gel to the right shoulder as needed.  E. Continue Diclofenac 50 mg two times daily only when necessary for breakthrough pain. Patient has been instructed again not to take any other NSAIDs, Refilled    3. Diagnostic studies: Patient underwent random drug screening today     4. Long-term rehabilitation efforts:  A. The patient does not have a history of recent falls within the last 3 months. I did complete a risk assessment for falls.   B. Start an exercise program such as water therapy, daily walks for fitness and progressive strength training  C. Start a comprehensive physical therapy program gait and balance training, neurodynamics, upper body strengthening, posture correction, ultrasound, ASTYM, E-STIM, myofascial release, cupping, dry needling, home exercise program, 3 x per week for 8 weeks   D. Start occupational therapy once a week for fine  motor skills    5. The patient has been instructed to contact my office with any questions or difficulties. The patient understands the plan and agrees to proceed accordingly.    The patient has a documented plan of care to address pain.   Nettie Araya reports a pain score of 7/10.  Given her pain assessment as noted, treatment options were discussed and the  following options were decided upon as a follow-up plan to address the patient's pain: continuation of current treatment plan for pain, home exercises and therapy, prescription for non-opiod analgesics, and use of non-medical modalities (ice, heat, stretching and/or behavior modifications).    A controlled substance agreement is in chart. The patient agreed with the terms of this agreement. The patient has received a copy of the agreement.  and UDS will be obtained today      Pain Medications              baclofen (LIORESAL) 10 MG tablet Take 1 tablet by mouth 4 (Four) Times a Day.    buPROPion SR (WELLBUTRIN SR) 150 MG 12 hr tablet Take 1 tablet by mouth Every 12 (Twelve) Hours.    diclofenac (VOLTAREN) 50 MG EC tablet Take 1 tablet by mouth 2 (Two) Times a Day.    DULoxetine (CYMBALTA) 60 MG capsule Take 1 capsule by mouth Daily.    nortriptyline (PAMELOR) 10 MG capsule TAKE 1 CAPSULE BY MOUTH EVERY NIGHT. MAY TAKE 1 TO 2 CAPSULE AT NIGHT    traZODone (DESYREL) 100 MG tablet Take 1 tablet by mouth every night at bedtime.          Patient Care Team:  Nhi Plascencia APRN as PCP - General (Nurse Practitioner)  Kaveh Randle MD as Consulting Physician (Pain Medicine)  Nettie Garcia DO as Consulting Physician (Neurology)  Jam Pack MD as Consulting Physician (Neurosurgery)  Sherri Gill MD as Gynecologist (Obstetrics and Gynecology)  Corrina Holley APRN as Nurse Practitioner (Nurse Practitioner)  Iwona Leblanc MD as Consulting Physician (Endocrinology)     New Medications Ordered This Visit   Medications    baclofen (LIORESAL) 10 MG tablet     Sig: Take 1 tablet by mouth 4 (Four) Times a Day.     Dispense:  40 tablet     Refill:  5    DULoxetine (CYMBALTA) 60 MG capsule     Sig: Take 1 capsule by mouth Daily.     Dispense:  90 capsule     Refill:  5    diclofenac (VOLTAREN) 50 MG EC tablet     Sig: Take 1 tablet by mouth 2 (Two) Times a Day.     Dispense:  180 tablet     Refill:  1          Future Appointments   Date Time Provider Department Center   6/24/2025  8:30 AM Kaveh Randle MD [facility]           Kaveh Randle MD

## 2025-03-27 NOTE — TELEPHONE ENCOUNTER
Spoke with Hola at [facility]. Oral drug screen collected, scheduled for  prior to 430 today.

## 2025-04-16 ENCOUNTER — HOSPITAL ENCOUNTER (OUTPATIENT)
Dept: HOSPITAL 22 - LAB | Age: 50
Discharge: HOME | End: 2025-04-16
Payer: MEDICARE

## 2025-04-16 DIAGNOSIS — N28.9: Primary | ICD-10-CM

## 2025-04-16 DIAGNOSIS — M79.18 MYOFASCIAL PAIN: ICD-10-CM

## 2025-04-16 LAB
ALBUMIN LEVEL: 3.7 G/DL (ref 3.5–5)
ANION GAP SERPL CALC-SCNC: 11.2 MEQ/L (ref 5–15)
BACTERIA URNS QL MICRO: (no result) /LPF
BUN SERPL-MCNC: 12 MG/DL (ref 7–17)
CALCIUM SPEC-MCNC: 8.7 MG/DL (ref 8.4–10.2)
CHLORIDE SPEC-SCNC: 92 MMOL/L (ref 98–107)
CO2 SERPL-SCNC: 31 MMOL/L (ref 22–30)
COLOR UR: YELLOW
ESTIMATED GLOMERULAR FILT RATE: 47 ML/MIN (ref 60–?)
GFR (AFRICAN AMERICAN): 57 ML/MIN (ref 60–?)
GLUCOSE: 85 MG/DL (ref 74–100)
HCT VFR BLD AUTO: 31.5 % (ref 37–47)
HGB BLD-MCNC: 9.9 G/DL (ref 12.2–16.2)
MCH RBC QN AUTO: 23.6 PG (ref 27–31.2)
MCHC RBC-ENTMCNC: 31.4 G/DL (ref 31.8–35.4)
MICRO URNS: (no result)
NUCLEATED RED BLOOD CELLS %: 0 %
PHOSPHOROUS: 3.4 MG/DL (ref 2.5–4.5)
PLATELET # BLD: 446 K/MM3 (ref 142–424)
POTASSIUM: 2.2 MMOL/L (ref 3.5–5.1)
SODIUM SPEC-SCNC: 132 MMOL/L (ref 136–145)
SP GR UR: <= 1.005 (ref 1–1.03)
SQUAMOUS URNS QL MICRO: (no result) #/HPF (ref 0–5)
UROBILINOGEN UR QL: 0.2 EU/DL
WBC # BLD AUTO: 6.3 K/MM3 (ref 4.8–10.8)

## 2025-04-16 PROCEDURE — 82570 ASSAY OF URINE CREATININE: CPT

## 2025-04-16 PROCEDURE — 80069 RENAL FUNCTION PANEL: CPT

## 2025-04-16 PROCEDURE — 36415 COLL VENOUS BLD VENIPUNCTURE: CPT

## 2025-04-16 PROCEDURE — 85025 COMPLETE CBC W/AUTO DIFF WBC: CPT

## 2025-04-16 PROCEDURE — 81001 URINALYSIS AUTO W/SCOPE: CPT

## 2025-04-16 PROCEDURE — 84156 ASSAY OF PROTEIN URINE: CPT

## 2025-04-17 RX ORDER — BACLOFEN 10 MG/1
10 TABLET ORAL 4 TIMES DAILY
Qty: 40 TABLET | Refills: 5 | Status: SHIPPED | OUTPATIENT
Start: 2025-04-17

## 2025-06-04 RX ORDER — ESTRADIOL AND NORETHINDRONE ACETATE 1; .5 MG/1; MG/1
1 TABLET ORAL DAILY
Qty: 84 TABLET | Refills: 0 | Status: SHIPPED | OUTPATIENT
Start: 2025-06-04

## 2025-06-17 ENCOUNTER — TELEPHONE (OUTPATIENT)
Dept: PAIN MEDICINE | Facility: CLINIC | Age: 50
End: 2025-06-17
Payer: MEDICARE

## 2025-06-17 NOTE — TELEPHONE ENCOUNTER
Spoke with pharmacy regarding received PA for Cymbalta.   They report that they have to call the Workers comp and have them release the medication. Requested that they do so, so patient can  medication.

## 2025-06-18 ENCOUNTER — HOSPITAL ENCOUNTER (OUTPATIENT)
Dept: HOSPITAL 22 - LAB | Age: 50
Discharge: HOME | End: 2025-06-18
Payer: MEDICARE

## 2025-06-18 DIAGNOSIS — E55.9: Primary | ICD-10-CM

## 2025-06-18 DIAGNOSIS — E27.8: ICD-10-CM

## 2025-06-18 DIAGNOSIS — N28.9: ICD-10-CM

## 2025-06-18 LAB
25-OH VITAMIN D, TOTAL: < 12.8 NG/ML (ref 30–100)
ALBUMIN LEVEL: 3.7 G/DL (ref 3.5–5)
ANION GAP SERPL CALC-SCNC: 9 MEQ/L (ref 5–15)
BASOPHILS # BLD AUTO: 0 K/MM3 (ref 0–0.2)
BASOPHILS NFR BLD AUTO: 0.8 % (ref 0.1–2)
BUN SERPL-MCNC: 9 MG/DL (ref 7–17)
CALCIUM SPEC-MCNC: 9.2 MG/DL (ref 8.4–10.2)
CHLORIDE SPEC-SCNC: 103 MMOL/L (ref 98–107)
CO2 SERPL-SCNC: 30 MMOL/L (ref 22–30)
CREAT BLD-SCNC: 0.9 MG/DL (ref 0.52–1.04)
CREATININE,URINE RANDOM: 56 MG/DL
DEPRECATED RDW RBC AUTO: 19.1 % (ref 11.5–17.5)
EOSINOPHIL # BLD AUTO: 0.1 KMM3 (ref 0–0.4)
EOSINOPHIL NFR BLD AUTO: 2.5 % (ref 0.1–12)
ESTIMATED GLOMERULAR FILT RATE: 66 ML/MIN (ref 60–?)
GFR (AFRICAN AMERICAN): 80 ML/MIN (ref 60–?)
GLUCOSE: 88 MG/DL (ref 74–100)
HCT VFR BLD AUTO: 34.6 % (ref 37–47)
HGB BLD-MCNC: 10.6 G/DL (ref 12.2–16.2)
IMM GRANULOCYTES # BLD AUTO: 0.05 10^3UL
IMM GRANULOCYTES NFR BLD AUTO: 1 %
LYMPHOCYTES # SPEC AUTO: 1.5 K/MM3 (ref 0.7–4.5)
LYMPHOCYTES %: 30.1 % (ref 10–50)
MCH RBC QN AUTO: 23.5 PG (ref 27–31.2)
MCHC RBC-ENTMCNC: 30.6 G/DL (ref 31.8–35.4)
MCV RBC: 76.5 FL (ref 81–99)
MONOCYTES # BLD AUTO: 0.4 K/MM3 (ref 0.1–1)
MONOCYTES NFR BLD AUTO: 7.8 % (ref 1.7–9.3)
NEUTROPHILS # BLD AUTO: 2.8 K/MM3 (ref 1.8–7.8)
NEUTROPHILS NFR BLD AUTO: 57.8 % (ref 37–80)
NUCLEATED RED BLOOD CELLS #: 0 10^3/UL
NUCLEATED RED BLOOD CELLS %: 0 %
PHOSPHOROUS: 2.9 MG/DL (ref 2.5–4.5)
PLATELET # BLD: 496 K/MM3 (ref 142–424)
PMV BLD AUTO: 8.5 FL (ref 7.4–10.4)
POTASSIUM: 4 MMOL/L (ref 3.5–5.1)
RBC # BLD AUTO: 4.52 M/MM3 (ref 4.2–5.4)
RED CELL DISTRIBUTION WIDTH-SD: 52.2 FL
SODIUM SPEC-SCNC: 138 MMOL/L (ref 136–145)
TOTAL PROTEIN,URINE RANDOM: 14 MG/DL (ref 0–12)
WBC # BLD AUTO: 4.9 K/MM3 (ref 4.8–10.8)
WBC NRBC COR # BLD: (no result) K/MM3 (ref 4.8–10.8)

## 2025-06-18 PROCEDURE — 84244 ASSAY OF RENIN: CPT

## 2025-06-18 PROCEDURE — 82626 DEHYDROEPIANDROSTERONE: CPT

## 2025-06-18 PROCEDURE — 82570 ASSAY OF URINE CREATININE: CPT

## 2025-06-18 PROCEDURE — 83835 ASSAY OF METANEPHRINES: CPT

## 2025-06-18 PROCEDURE — 85025 COMPLETE CBC W/AUTO DIFF WBC: CPT

## 2025-06-18 PROCEDURE — 82085 ASSAY OF ALDOLASE: CPT

## 2025-06-18 PROCEDURE — 84156 ASSAY OF PROTEIN URINE: CPT

## 2025-06-18 PROCEDURE — 82306 VITAMIN D 25 HYDROXY: CPT

## 2025-06-18 PROCEDURE — 36415 COLL VENOUS BLD VENIPUNCTURE: CPT

## 2025-06-18 PROCEDURE — 80069 RENAL FUNCTION PANEL: CPT

## 2025-06-19 LAB
ALDOLASE: 3 U/L (ref 3.3–10.3)
DHEA-SULFATE: 1.8 UG/DL (ref 41.2–243.7)

## 2025-07-01 ENCOUNTER — TRANSCRIBE ORDERS (OUTPATIENT)
Dept: ADMINISTRATIVE | Facility: HOSPITAL | Age: 50
End: 2025-07-01
Payer: MEDICARE

## 2025-07-01 DIAGNOSIS — Z12.31 SCREENING MAMMOGRAM FOR BREAST CANCER: Primary | ICD-10-CM

## 2025-07-02 ENCOUNTER — TELEPHONE (OUTPATIENT)
Dept: NEUROSURGERY | Facility: CLINIC | Age: 50
End: 2025-07-02
Payer: MEDICARE

## 2025-07-02 NOTE — TELEPHONE ENCOUNTER
Caller: Marshal Nettie Janessa    Relationship: Self    Best call back number: [phone]    What is the best time to reach you: ANYTIME    Who are you requesting to speak with (clinical staff, provider,  specific staff member):  OR HIS ASSISTANT    Do you know the name of the person who called:     What was the call regarding: PATIENT CALLED AND STATES THAT SHE HAS A QUESTION SHE WANTED TO ASK  OR HIS ASSISTANT-PATIENT DID NOT WANT TO GO INTO DETAIL REGARDING THE QUESTION-SHE DID STATE IT WAS REGARDING A 2ND OPINION-SENDING TO OFFICE TO ADVISE OF CALL FROM PATIENT THANK YOU     Is it okay if the provider responds through MyChart:

## 2025-07-07 NOTE — TELEPHONE ENCOUNTER
Caller: Nettie Araya    Relationship: Self    Best call back number: [phone]    What is the best time to reach you: ANYTIME    Who are you requesting to speak with (clinical staff, provider,  specific staff member): CLINICAL     Do you know the name of the person who called: NA    What was the call regarding: PATIENT CALLED REGARDING PREVIOUS MESSAGE-PATIENT STATES THAT SHE HAD NOT HEARD ANYTHING BACK-PATIENT STATES THAT SHE HAS SEEN  AND HE WAS RECOMMENDING SURGERY-PATIENT IS ASKING OF  HAS SPOKEN TO -PATIENT IS REQUESTING A CALL BACK ASAP TO ADVISE THANK YOU    Is it okay if the provider responds through MyChart:

## 2025-07-08 ENCOUNTER — HOSPITAL ENCOUNTER (OUTPATIENT)
Dept: HOSPITAL 22 - RAD | Age: 50
Discharge: HOME | End: 2025-07-08
Payer: MEDICARE

## 2025-07-08 DIAGNOSIS — M79.672: ICD-10-CM

## 2025-07-08 DIAGNOSIS — M25.572: ICD-10-CM

## 2025-07-08 DIAGNOSIS — M79.89: Primary | ICD-10-CM

## 2025-07-08 PROCEDURE — 73610 X-RAY EXAM OF ANKLE: CPT

## 2025-07-08 PROCEDURE — 73630 X-RAY EXAM OF FOOT: CPT

## 2025-07-08 PROCEDURE — 73590 X-RAY EXAM OF LOWER LEG: CPT

## 2025-07-09 ENCOUNTER — TELEMEDICINE (OUTPATIENT)
Dept: NEUROSURGERY | Facility: CLINIC | Age: 50
End: 2025-07-09

## 2025-07-09 DIAGNOSIS — M47.812 FACET ARTHROPATHY, CERVICAL: Primary | ICD-10-CM

## 2025-07-09 PROCEDURE — 99214 OFFICE O/P EST MOD 30 MIN: CPT | Performed by: NEUROLOGICAL SURGERY

## 2025-07-09 NOTE — PROGRESS NOTES
NAME: ROSIE JIANG   DOS: 2025  : 1975  PCP: Nhi Plascencia APRN    Chief Complaint:   Follow-up neck pain      History of Present Illness:  50 y.o. female   Is a 50-year-old female well-known to me for history of complex spinal scoliosis of the back kyphotic angulation and prior spinal stimulator placement    Center for second opinion with one of my colleagues regarding decompression and we both agree that she has high-grade spinal stenosis and kyphotic angulation    She is here for follow-up    PMHX  Allergies:  No Known Allergies  Medications    Current Outpatient Medications:     acyclovir (ZOVIRAX) 400 MG tablet, Take 1 tablet by mouth 2 (Two) Times a Day. for 10 days, Disp: , Rfl:     Aimovig 140 MG/ML prefilled syringe, Inject 1 mL under the skin into the appropriate area as directed Every 30 (Thirty) Days., Disp: , Rfl:     amLODIPine (NORVASC) 2.5 MG tablet, Take 1 tablet by mouth Daily., Disp: , Rfl:     baclofen (LIORESAL) 10 MG tablet, Take 1 tablet by mouth 4 (Four) Times a Day., Disp: 40 tablet, Rfl: 5    buPROPion SR (WELLBUTRIN SR) 150 MG 12 hr tablet, Take 1 tablet by mouth Every 12 (Twelve) Hours., Disp: , Rfl: 0    chlorthalidone (HYGROTEN) 50 MG tablet, Take 1 tablet by mouth Daily., Disp: , Rfl:     diclofenac (VOLTAREN) 50 MG EC tablet, Take 1 tablet by mouth 2 (Two) Times a Day., Disp: 180 tablet, Rfl: 1    DULoxetine (CYMBALTA) 60 MG capsule, Take 1 capsule by mouth Daily., Disp: 90 capsule, Rfl: 5    estradiol-norethindrone (ACTIVELLA) 1-0.5 MG per tablet, Take 1 tablet by mouth once daily, Disp: 84 tablet, Rfl: 0    FeroSul 325 (65 Fe) MG tablet, Take 1 tablet by mouth Daily., Disp: , Rfl:     Gabapentin powder, , Disp: , Rfl:     gabapentin, once-daily, (Gralise) 600 MG tablet tablet, TAKE 1 TABLET BY MOUTH DAILY, Disp: 30 tablet, Rfl: 6    Gel Base gel, Apply 1 to 2 grams of pain gel to affected areas three to four times a day., Disp: 102 g, Rfl: PRN    LORazepam  (ATIVAN) 1 MG tablet, 1 tablet., Disp: , Rfl:     nortriptyline (PAMELOR) 10 MG capsule, TAKE 1 CAPSULE BY MOUTH EVERY NIGHT. MAY TAKE 1 TO 2 CAPSULE AT NIGHT, Disp: 60 capsule, Rfl: 5    omeprazole (priLOSEC) 20 MG capsule, Take 1 capsule by mouth Daily., Disp: , Rfl:     ondansetron (ZOFRAN) 4 MG tablet, Take 1 tablet by mouth Every 8 (Eight) Hours As Needed for Nausea or Vomiting., Disp: , Rfl:     pantoprazole (PROTONIX) 40 MG EC tablet, Take 1 tablet by mouth Daily., Disp: , Rfl:     potassium chloride (KLOR-CON M20) 20 MEQ CR tablet, Take 2 tablets by mouth Every 12 (Twelve) Hours., Disp: , Rfl:     traZODone (DESYREL) 100 MG tablet, Take 1 tablet by mouth every night at bedtime., Disp: , Rfl:     ubrogepant 100 MG tablet, TAKE 1 TABLET BY MOUTH AT ONSET OF HEADACHE, Disp: , Rfl:     zolpidem (AMBIEN) 5 MG tablet, Take 1 tablet by mouth At Night As Needed., Disp: , Rfl:   Past Medical History:  Past Medical History:   Diagnosis Date    Abnormal MRI, shoulder     Right Shoulder MRI 12/2010 revealed small glenohumeral joint effusion, capsulitis, tendinitis. No rotator cuff for labral tear was noted.     Abnormal Pap smear of cervix     Abnormal x-ray     X-Ray of the right wrist on 04/30/2014, revealed linear sclerosis within the distal radial metaphysis consistent with healed fracture. There is irregularity of the ulnar styloid consistent with prior fracture.      Anemia     Anxiety     Chronic pain disorder ??    Depression     Extremity pain ?    Fractures ??    Headache ??    Headache, tension-type ??    History of constipation     History of insomnia     Seconday to pain    History of migraine headaches     History of transfusion     History of varicose veins     HPV (human papilloma virus) infection     Low back pain     Migraine ??    Multiple gestation     Neck pain ??    Ovarian cyst     Pain in joint, shoulder region     PMS (premenstrual syndrome)     Raynaud's disease     Reflex sympathetic dystrophy  ??    Urinary tract infection      Past Surgical History:  Past Surgical History:   Procedure Laterality Date    BACK SURGERY      SCS implant    CERVICAL LAMINECTOMY DECOMPRESSION POSTERIOR Bilateral 10/16/2018    Procedure: PARTIAL C2, C3, C4 LAMINECTOMY SPINAL CORD STIMULATOR REMOVAL INSERTION OF SPINAL CORD STIMULATOR;  Surgeon: Jam Pack MD;  Location: [facility] OR;  Service: Neurosurgery     SECTION      x 3     SECTION WITH TUBAL  1    COLONOSCOPY      EPIDURAL BLOCK  ?    FRACTURE SURGERY      ORTHOPEDIC SURGERY  ??    OTHER SURGICAL HISTORY      Left Distal Radiolnar Joint Stabilization and Capsulodesis    ROTATOR CUFF REPAIR      Rotator cuff repair followed by three surgeries and manipulation under anesthesia with Dr. Ludwig. Fourth surgery performed by Dr. Graves.  Removal of suture and subacromial decompression with lysis of adhesions 2011.    SPINAL CORD STIMULATOR IMPLANT N/A 10/16/2018    Procedure: REMOVAL AND REPLACEMENT SPINAL CORD STIMULATOR;  Surgeon: Jam Pack MD;  Location: [facility] OR;  Service: Neurosurgery    SPINAL CORD STIMULATOR IMPLANT N/A 2021    Procedure: SPINAL CORD STIMULATOR REVISION CERVICAL;  Surgeon: Jam Pack MD;  Location: [facility] OR;  Service: Neurosurgery;  Laterality: N/A;    SPINE SURGERY  ?    THYROIDECTOMY, PARTIAL      TUBAL ABDOMINAL LIGATION      WRIST SURGERY       Social Hx:  Social History     Tobacco Use    Smoking status: Never     Passive exposure: Never    Smokeless tobacco: Never   Vaping Use    Vaping status: Never Used   Substance Use Topics    Alcohol use: Never    Drug use: Never     Family Hx:  Family History   Problem Relation Age of Onset    Diabetes Mother     Hypertension Mother     Stroke Mother     Lymphoma Father     Lung cancer Father     Brain cancer Father     Liver cancer Father     Cancer Father     COPD Brother     Multiple sclerosis Sister     No Known Problems Daughter     No Known  Problems Daughter     No Known Problems Daughter     Heart disease Maternal Grandmother     Breast cancer Neg Hx     Ovarian cancer Neg Hx     Uterine cancer Neg Hx     Colon cancer Neg Hx      Review of Systems:        Review of Systems   Constitutional:  Negative for activity change, appetite change, chills, diaphoresis, fatigue, fever and unexpected weight change.   HENT:  Negative for congestion, dental problem, drooling, ear discharge, ear pain, facial swelling, hearing loss, mouth sores, nosebleeds, postnasal drip, rhinorrhea, sinus pressure, sinus pain, sneezing, sore throat, tinnitus, trouble swallowing and voice change.    Eyes:  Negative for photophobia, pain, discharge, redness, itching and visual disturbance.   Respiratory:  Negative for apnea, cough, choking, chest tightness, shortness of breath, wheezing and stridor.    Cardiovascular:  Negative for chest pain, palpitations and leg swelling.   Gastrointestinal:  Negative for abdominal distention, abdominal pain, anal bleeding, blood in stool, constipation, diarrhea, nausea, rectal pain and vomiting.   Endocrine: Negative for cold intolerance, heat intolerance, polydipsia, polyphagia and polyuria.   Genitourinary:  Negative for decreased urine volume, difficulty urinating, dyspareunia, dysuria, enuresis, flank pain, frequency, genital sores, hematuria, menstrual problem, pelvic pain, urgency, vaginal bleeding, vaginal discharge and vaginal pain.   Musculoskeletal:  Negative for arthralgias, back pain, gait problem, joint swelling, myalgias, neck pain and neck stiffness.   Skin:  Negative for color change, pallor, rash and wound.   Allergic/Immunologic: Negative for environmental allergies, food allergies and immunocompromised state.   Neurological:  Negative for dizziness, tremors, seizures, syncope, facial asymmetry, speech difficulty, weakness, light-headedness, numbness and headaches.   Hematological:  Negative for adenopathy. Does not bruise/bleed  easily.   Psychiatric/Behavioral:  Negative for agitation, behavioral problems, confusion, decreased concentration, dysphoric mood, hallucinations, self-injury, sleep disturbance and suicidal ideas. The patient is not nervous/anxious and is not hyperactive.         I have reviewed this note template and all pertinent parts of the review of systems social, family history, surgical history and medication list  Physical Examination:  There were no vitals filed for this visit.   General Appearance:   Well developed, well nourished, well groomed, alert, and cooperative.  Neurological examination:  Neurological Exam   Wide-awake alert and follows commands    Appears in no apparent distress    Able to operate her cell phone well over video    Review of Imaging/DATA:  MRIs are impressive she is got a C3-4 disc herniation she is got hyperlordosis at the upper cervical spine combined with kyphotic angulation in the mid cervical  Diagnoses/Plan:    Ms. Araya is a 50 y.o. female   1.  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  Chronic neck pain    3.  Status post tunnel stimulator    4.  Malnutrition issues    5.  Cord compression    After extensive discussion shared decision making I suspect that a reasonable course of action would be decompression of the dorsal aspect followed by multilevel anterior cervical fusion.  I explained that she will need her family to come but I think we are at a point where she has progressive deformity and its time to rectify this given this the myelopathic component of her symptoms    I will see her to discuss further    I spent 30 minutes in the care of the patient also spoke personally with Dr. Elmer Villafana regarding his recommendations

## 2025-07-11 ENCOUNTER — HOSPITAL ENCOUNTER (OUTPATIENT)
Dept: HOSPITAL 22 - LAB | Age: 50
Discharge: HOME | End: 2025-07-11
Payer: MEDICARE

## 2025-07-11 DIAGNOSIS — E27.8: ICD-10-CM

## 2025-07-11 DIAGNOSIS — N28.9: Primary | ICD-10-CM

## 2025-07-11 LAB
ALBUMIN LEVEL: 3.8 G/DL (ref 3.5–5)
AMORPH SED URNS QL MICRO: (no result) /LPF
ANION GAP SERPL CALC-SCNC: 12.6 MEQ/L (ref 5–15)
APPEARANCE UR: (no result)
BACTERIA URNS QL MICRO: (no result) /LPF
BASOPHILS # BLD AUTO: 0 K/MM3 (ref 0–0.2)
BASOPHILS NFR BLD AUTO: 0.4 % (ref 0.1–2)
BILIRUB UR STRIP-MCNC: NEGATIVE MG/DL
BLOOD UR QL: NEGATIVE
BUN SERPL-MCNC: 10 MG/DL (ref 7–17)
CA CARBONATE CRY URNS QL MICRO: (no result) /LPF
CA PHOS CRY URNS QL MICRO: (no result) /LPF
CALCIUM SPEC-MCNC: 9.2 MG/DL (ref 8.4–10.2)
CAOX CRY URNS QL MICRO: (no result) /LPF
CHLORIDE SPEC-SCNC: 100 MMOL/L (ref 98–107)
CO2 SERPL-SCNC: 32 MMOL/L (ref 22–30)
COARSE GRAN CASTS URNS QL MICRO: (no result) #/LPF
COLOR UR: YELLOW
CREAT BLD-SCNC: 0.8 MG/DL (ref 0.52–1.04)
CREATININE,URINE RANDOM: 128 MG/DL
CRYSTALS URNS MICRO: (no result) /LPF
CYSTINE CRY URNS QL MICRO: (no result) /LPF
DEPRECATED RDW RBC AUTO: 18.9 % (ref 11.5–17.5)
EOSINOPHIL # BLD AUTO: 0.1 KMM3 (ref 0–0.4)
EOSINOPHIL NFR BLD AUTO: 1.3 % (ref 0.1–12)
ESTIMATED GLOMERULAR FILT RATE: 76 ML/MIN (ref 60–?)
FATTY CASTS URNS QL MICRO: (no result) #/LPF
GFR (AFRICAN AMERICAN): 92 ML/MIN (ref 60–?)
GLUCOSE UR QL STRIP.AUTO: NEGATIVE
GLUCOSE: 88 MG/DL (ref 74–100)
HCT VFR BLD AUTO: 28.1 % (ref 37–47)
HGB BLD-MCNC: 8.2 G/DL (ref 12.2–16.2)
HYALINE CASTS URNS QL MICRO: (no result) #/LPF
IMM GRANULOCYTES # BLD AUTO: 0.04 10^3UL
IMM GRANULOCYTES NFR BLD AUTO: 0.9 %
KETONES UR STRIP.AUTO-MCNC: NEGATIVE MG/DL
LEUKOCYTE ESTERASE UR QL STRIP: NEGATIVE
LYMPHOCYTES # SPEC AUTO: 1.2 K/MM3 (ref 0.7–4.5)
LYMPHOCYTES %: 25.4 % (ref 10–50)
Lab: (no result) #/LPF
MCH RBC QN AUTO: 22.4 PG (ref 27–31.2)
MCHC RBC-ENTMCNC: 29.2 G/DL (ref 31.8–35.4)
MCV RBC: 76.8 FL (ref 81–99)
MICRO URNS: (no result)
MONOCYTES # BLD AUTO: 0.4 K/MM3 (ref 0.1–1)
MONOCYTES NFR BLD AUTO: 9.4 % (ref 1.7–9.3)
MUCOUS THREADS URNS QL MICRO: (no result) /LPF
NEUTROPHILS # BLD AUTO: 2.9 K/MM3 (ref 1.8–7.8)
NEUTROPHILS NFR BLD AUTO: 62.6 % (ref 37–80)
NITRATE,URINE: NEGATIVE
NUCLEATED RED BLOOD CELLS #: 0 10^3/UL
NUCLEATED RED BLOOD CELLS %: 0 %
PH UR: 7.5 [PH] (ref 5–8.5)
PHOSPHOROUS: 3.1 MG/DL (ref 2.5–4.5)
PLATELET # BLD: 369 K/MM3 (ref 142–424)
PMV BLD AUTO: 9.2 FL (ref 7.4–10.4)
POTASSIUM: 3.6 MMOL/L (ref 3.5–5.1)
PROT UR STRIP-MCNC: NEGATIVE MG/DL
RBC # BLD AUTO: 3.66 M/MM3 (ref 4.2–5.4)
RBC #/AREA URNS HPF: (no result) #/HPF (ref 0–3)
RED CELL DISTRIBUTION WIDTH-SD: 52 FL
RENAL EPI CELLS URNS QL MICRO: (no result) #/LPF
SODIUM SPEC-SCNC: 141 MMOL/L (ref 136–145)
SP GR UR: 1.02 (ref 1–1.03)
SPERM # UR AUTO: (no result) /LPF
TOTAL PROTEIN,URINE RANDOM: 12 MG/DL (ref 0–12)
TRANS CELLS URNS QL MICRO: (no result) #/LPF (ref 0–3)
TRI-PHOS CRY URNS QL MICRO: (no result) /LPF
TRICHOMONAS UR QL MICRO: (no result) /LPF
URATE CRY URNS QL MICRO: (no result) /LPF
UROBILINOGEN UR QL: 2 EU/DL
WBC # BLD AUTO: 4.6 K/MM3 (ref 4.8–10.8)
WBC CASTS #/AREA URNS LPF: (no result) #/LPF
WBC NRBC COR # BLD: (no result) K/MM3 (ref 4.8–10.8)
YEAST URNS QL MICRO: (no result) /LPF

## 2025-07-11 PROCEDURE — 87186 SC STD MICRODIL/AGAR DIL: CPT

## 2025-07-11 PROCEDURE — 82627 DEHYDROEPIANDROSTERONE: CPT

## 2025-07-11 PROCEDURE — 36415 COLL VENOUS BLD VENIPUNCTURE: CPT

## 2025-07-11 PROCEDURE — 81001 URINALYSIS AUTO W/SCOPE: CPT

## 2025-07-11 PROCEDURE — 84156 ASSAY OF PROTEIN URINE: CPT

## 2025-07-11 PROCEDURE — 87086 URINE CULTURE/COLONY COUNT: CPT

## 2025-07-11 PROCEDURE — 84244 ASSAY OF RENIN: CPT

## 2025-07-11 PROCEDURE — 82570 ASSAY OF URINE CREATININE: CPT

## 2025-07-11 PROCEDURE — 82088 ASSAY OF ALDOSTERONE: CPT

## 2025-07-11 PROCEDURE — 80069 RENAL FUNCTION PANEL: CPT

## 2025-07-11 PROCEDURE — 87088 URINE BACTERIA CULTURE: CPT

## 2025-07-11 PROCEDURE — 83835 ASSAY OF METANEPHRINES: CPT

## 2025-07-11 PROCEDURE — 85025 COMPLETE CBC W/AUTO DIFF WBC: CPT

## 2025-07-12 LAB — DHEA-SULFATE: 2.8 UG/DL (ref 41.2–243.7)

## 2025-07-14 LAB — RENIN PLAS-CCNC: 8.34 NG/ML/HR (ref 0.17–5.38)

## 2025-07-16 LAB — ALDOSTERONE: 6.4 NG/DL (ref 0–30)

## 2025-07-22 ENCOUNTER — HOSPITAL ENCOUNTER (OUTPATIENT)
Dept: HOSPITAL 22 - RAD | Age: 50
Discharge: HOME | End: 2025-07-22
Payer: MEDICARE

## 2025-07-22 DIAGNOSIS — R69: Primary | ICD-10-CM

## 2025-07-22 LAB
METANEPHRINE PLASMA: < 25 PG/ML (ref 0–88)
NORMETANEPHRINE PLASMA: 195.7 PG/ML (ref 0–244)

## 2025-07-24 ENCOUNTER — OFFICE VISIT (OUTPATIENT)
Dept: NEUROSURGERY | Facility: CLINIC | Age: 50
End: 2025-07-24
Payer: OTHER MISCELLANEOUS

## 2025-07-24 VITALS — BODY MASS INDEX: 20.14 KG/M2 | TEMPERATURE: 98.1 F | WEIGHT: 125.3 LBS | HEIGHT: 66 IN

## 2025-07-24 DIAGNOSIS — M47.812 FACET ARTHROPATHY, CERVICAL: Primary | ICD-10-CM

## 2025-07-24 DIAGNOSIS — M54.2 CERVICALGIA: ICD-10-CM

## 2025-07-24 DIAGNOSIS — E44.1 MILD MALNUTRITION: ICD-10-CM

## 2025-07-24 DIAGNOSIS — M50.20 CERVICAL DISC HERNIATION: ICD-10-CM

## 2025-07-24 NOTE — PROGRESS NOTES
NAME: ROSIE JIANG   DOS: 2025  : 1975  PCP: Nhi Plascencia APRN    Chief Complaint:    Chief Complaint   Patient presents with    Follow-up     F/U discuss surgery. Cervicalgia.    Neck Pain    Numbness     RT worse than LT.     Arm Pain     BUE       History of Present Illness:  50 y.o. female she is here for follow-up  1.  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  Chronic neck pain     3.  Status post tunnel stimulator     4.  Malnutrition issues     5.  Cord compression     After extensive discussion shared decision making I suspect that a reasonable course of action would be decompression of the dorsal aspect followed by multilevel anterior cervical fusion.  I explained that she will need her family to come but I think we are at a point where she has progressive deformity and its time to rectify this given this the myelopathic component of her symptoms she arrived in clinic today without any family    PMHX  Allergies:  No Known Allergies  Medications    Current Outpatient Medications:     acyclovir (ZOVIRAX) 400 MG tablet, Take 1 tablet by mouth 2 (Two) Times a Day. for 10 days, Disp: , Rfl:     Aimovig 140 MG/ML prefilled syringe, Inject 1 mL under the skin into the appropriate area as directed Every 30 (Thirty) Days., Disp: , Rfl:     amLODIPine (NORVASC) 2.5 MG tablet, Take 1 tablet by mouth Daily., Disp: , Rfl:     baclofen (LIORESAL) 10 MG tablet, Take 1 tablet by mouth 4 (Four) Times a Day., Disp: 40 tablet, Rfl: 5    buPROPion SR (WELLBUTRIN SR) 150 MG 12 hr tablet, Take 1 tablet by mouth Every 12 (Twelve) Hours., Disp: , Rfl: 0    chlorthalidone (HYGROTEN) 50 MG tablet, Take 1 tablet by mouth Daily., Disp: , Rfl:     diclofenac (VOLTAREN) 50 MG EC tablet, Take 1 tablet by mouth 2 (Two) Times a Day., Disp: 180 tablet, Rfl: 1    DULoxetine (CYMBALTA) 60 MG capsule, Take 1 capsule by mouth Daily., Disp: 90 capsule, Rfl: 5    estradiol-norethindrone (ACTIVELLA) 1-0.5 MG per tablet,  Take 1 tablet by mouth once daily, Disp: 84 tablet, Rfl: 0    FeroSul 325 (65 Fe) MG tablet, Take 1 tablet by mouth Daily., Disp: , Rfl:     Gabapentin powder, , Disp: , Rfl:     gabapentin, once-daily, (Gralise) 600 MG tablet tablet, TAKE 1 TABLET BY MOUTH DAILY, Disp: 30 tablet, Rfl: 6    Gel Base gel, Apply 1 to 2 grams of pain gel to affected areas three to four times a day., Disp: 102 g, Rfl: PRN    LORazepam (ATIVAN) 1 MG tablet, 1 tablet., Disp: , Rfl:     nortriptyline (PAMELOR) 10 MG capsule, TAKE 1 CAPSULE BY MOUTH EVERY NIGHT. MAY TAKE 1 TO 2 CAPSULE AT NIGHT, Disp: 60 capsule, Rfl: 5    omeprazole (priLOSEC) 20 MG capsule, Take 1 capsule by mouth Daily., Disp: , Rfl:     ondansetron (ZOFRAN) 4 MG tablet, Take 1 tablet by mouth Every 8 (Eight) Hours As Needed for Nausea or Vomiting., Disp: , Rfl:     pantoprazole (PROTONIX) 40 MG EC tablet, Take 1 tablet by mouth Daily., Disp: , Rfl:     potassium chloride (KLOR-CON M20) 20 MEQ CR tablet, Take 2 tablets by mouth Every 12 (Twelve) Hours., Disp: , Rfl:     traZODone (DESYREL) 100 MG tablet, Take 1 tablet by mouth every night at bedtime., Disp: , Rfl:     ubrogepant 100 MG tablet, TAKE 1 TABLET BY MOUTH AT ONSET OF HEADACHE, Disp: , Rfl:     zolpidem (AMBIEN) 5 MG tablet, Take 1 tablet by mouth At Night As Needed., Disp: , Rfl:   Past Medical History:  Past Medical History:   Diagnosis Date    Abnormal MRI, shoulder     Right Shoulder MRI 12/2010 revealed small glenohumeral joint effusion, capsulitis, tendinitis. No rotator cuff for labral tear was noted.     Abnormal Pap smear of cervix     Abnormal x-ray     X-Ray of the right wrist on 04/30/2014, revealed linear sclerosis within the distal radial metaphysis consistent with healed fracture. There is irregularity of the ulnar styloid consistent with prior fracture.      Anemia     Anxiety     Chronic pain disorder ??    Depression     Extremity pain ?    Fractures ??    Headache ??    Headache, tension-type  ??    History of constipation     History of insomnia     Seconday to pain    History of migraine headaches     History of transfusion     History of varicose veins     HPV (human papilloma virus) infection     Low back pain     Migraine ??    Multiple gestation     Neck pain ??    Ovarian cyst     Pain in joint, shoulder region     PMS (premenstrual syndrome)     Raynaud's disease     Reflex sympathetic dystrophy ??    Urinary tract infection      Past Surgical History:  Past Surgical History:   Procedure Laterality Date    BACK SURGERY      SCS implant    CERVICAL LAMINECTOMY DECOMPRESSION POSTERIOR Bilateral 10/16/2018    Procedure: PARTIAL C2, C3, C4 LAMINECTOMY SPINAL CORD STIMULATOR REMOVAL INSERTION OF SPINAL CORD STIMULATOR;  Surgeon: Jam Pack MD;  Location:  [facility];  Service: Neurosurgery     SECTION      x 3     SECTION WITH TUBAL  1    COLONOSCOPY      EPIDURAL BLOCK  ?    FRACTURE SURGERY      ORTHOPEDIC SURGERY  ??    OTHER SURGICAL HISTORY      Left Distal Radiolnar Joint Stabilization and Capsulodesis    ROTATOR CUFF REPAIR      Rotator cuff repair followed by three surgeries and manipulation under anesthesia with Dr. Ludwig. Fourth surgery performed by Dr. Graves.  Removal of suture and subacromial decompression with lysis of adhesions 2011.    SPINAL CORD STIMULATOR IMPLANT N/A 10/16/2018    Procedure: REMOVAL AND REPLACEMENT SPINAL CORD STIMULATOR;  Surgeon: Jam Pack MD;  Location:  [facility];  Service: Neurosurgery    SPINAL CORD STIMULATOR IMPLANT N/A 2021    Procedure: SPINAL CORD STIMULATOR REVISION CERVICAL;  Surgeon: Jam Pack MD;  Location: [facility];  Service: Neurosurgery;  Laterality: N/A;    SPINE SURGERY  ?    THYROIDECTOMY, PARTIAL      TUBAL ABDOMINAL LIGATION      WRIST SURGERY       Social Hx:  Social History     Tobacco Use    Smoking status: Never     Passive exposure: Never    Smokeless tobacco: Never   Vaping Use     Vaping status: Never Used   Substance Use Topics    Alcohol use: Never    Drug use: Never     Family Hx:  Family History   Problem Relation Age of Onset    Diabetes Mother     Hypertension Mother     Stroke Mother     Lymphoma Father     Lung cancer Father     Brain cancer Father     Liver cancer Father     Cancer Father     COPD Brother     Multiple sclerosis Sister     No Known Problems Daughter     No Known Problems Daughter     No Known Problems Daughter     Heart disease Maternal Grandmother     Breast cancer Neg Hx     Ovarian cancer Neg Hx     Uterine cancer Neg Hx     Colon cancer Neg Hx      Review of Systems:        Review of Systems   Constitutional:  Negative for activity change, appetite change, chills, diaphoresis, fatigue, fever and unexpected weight change.   HENT:  Negative for congestion, dental problem, drooling, ear discharge, ear pain, facial swelling, hearing loss, mouth sores, nosebleeds, postnasal drip, rhinorrhea, sinus pressure, sinus pain, sneezing, sore throat, tinnitus, trouble swallowing and voice change.    Eyes:  Negative for photophobia, pain, discharge, redness, itching and visual disturbance.   Respiratory:  Negative for apnea, cough, choking, chest tightness, shortness of breath, wheezing and stridor.    Cardiovascular:  Negative for chest pain, palpitations and leg swelling.   Gastrointestinal:  Negative for abdominal distention, abdominal pain, anal bleeding, blood in stool, constipation, diarrhea, nausea, rectal pain and vomiting.   Endocrine: Negative for cold intolerance, heat intolerance, polydipsia, polyphagia and polyuria.   Genitourinary:  Negative for decreased urine volume, difficulty urinating, dyspareunia, dysuria, enuresis, flank pain, frequency, genital sores, hematuria, menstrual problem, pelvic pain, urgency, vaginal bleeding, vaginal discharge and vaginal pain.   Musculoskeletal:  Positive for myalgias, neck pain and neck stiffness. Negative for arthralgias, back  pain, gait problem and joint swelling.   Skin:  Negative for color change, pallor, rash and wound.   Allergic/Immunologic: Negative for environmental allergies, food allergies and immunocompromised state.   Neurological:  Positive for weakness and numbness. Negative for dizziness, tremors, seizures, syncope, facial asymmetry, speech difficulty, light-headedness and headaches.   Hematological:  Negative for adenopathy. Does not bruise/bleed easily.   Psychiatric/Behavioral:  Negative for agitation, behavioral problems, confusion, decreased concentration, dysphoric mood, hallucinations, self-injury, sleep disturbance and suicidal ideas. The patient is not nervous/anxious and is not hyperactive.    All other systems reviewed and are negative.     I have reviewed this note template and all pertinent parts of the review of systems social, family history, surgical history and medication list    Physical Examination:  Vitals:    07/24/25 1059   Temp: 98.1 °F (36.7 °C)      General Appearance:   Well developed, well nourished, well groomed, alert, and cooperative.  Neurological examination:  Neurological Exam   She appears at her neurologic baseline    She has bilateral Camlia's    She has a swan-neck deformity    She is quite lean    She has dental caries    Her gait and station is normal she has no evidence of sustained clonus but hyperreflexic    Review of Imaging/DATA:  I personally reviewed and personally interpreted all the films all the notes and the films went through the surgical decision making again with her  Diagnoses/Plan:    Ms. Araya is a 50 y.o. female   1.  My hope today was to button up some counseling with her and her family about postoperative care given the types of surgery she is given need there is no family today    2.  Dental caries noted today    3.  Ongoing malnutrition    She has not yet engaged in a visiting with a nutritionist and/or eating protein shakes    After extensive discussion shared  decision making plan will be    1.  Follow-up 2 to 3 weeks    Nutritional consult    Protein shakes daily    Prealbumin    She needs to bring a family member she needs a cervical laminectomy with potential explantation of spinal stem we will try to obviously maintain that intact given her history but she is likely going to require a anterior fusion from C3 down after that to correct some of her lordosis    Plan will be for follow-up with the PA in 2 to 3 weeks to hit the above targets and then we can set her up for posterior cervical laminectomy I do not need to see her prior to that she is to be scheduled for a partial C2-C3 and 4 laminectomy with exploration of spinal stem and possible explantation

## 2025-07-28 RX ORDER — ESTRADIOL AND NORETHINDRONE ACETATE 1; .5 MG/1; MG/1
1 TABLET ORAL DAILY
Qty: 28 TABLET | Refills: 0 | Status: SHIPPED | OUTPATIENT
Start: 2025-07-28

## 2025-07-29 ENCOUNTER — TELEPHONE (OUTPATIENT)
Dept: PAIN MEDICINE | Facility: CLINIC | Age: 50
End: 2025-07-29
Payer: MEDICARE

## 2025-07-29 NOTE — TELEPHONE ENCOUNTER
Provider: DR. WOODS    Caller: KYA    Relationship to Patient: PHARMACY      Phone Number: [phone]    Reason for Call: KYA CALLED WANTING TO SPEAK TO SOMEONE ABOUT SETTING UP A PEER TO PEER REGARDING PATIENT'S MEDICATION. PLEASE ADVISE.

## 2025-07-30 DIAGNOSIS — G90.511 COMPLEX REGIONAL PAIN SYNDROME TYPE 1 OF RIGHT UPPER EXTREMITY: ICD-10-CM

## 2025-07-30 NOTE — TELEPHONE ENCOUNTER
Caller: Nettie Jiang    Relationship: Self    Best call back number: [phone]     Requested Prescriptions: COMPOUND CREAM FOR BACK, NECK, SHOULDERS AND ARMS  Requested Prescriptions      No prescriptions requested or ordered in this encounter        Pharmacy where request should be sent: [facility] - [place] [address] [facility] - [phone] PH - [phone] FX     Last office visit with prescribing clinician: 3/27/2025   Last telemedicine visit with prescribing clinician: Visit date not found   Next office visit with prescribing clinician: Visit date not found     Additional details provided by patient: MS JIANG IS OUT OF HER COMPOUND CREAM AND [facility] TOLD HER THEY NEED A NEW RX TO REFILL IT.     Does the patient have less than a 3 day supply:  [x] Yes  [] No    Would you like a call back once the refill request has been completed: [x] Yes [] No    If the office needs to give you a call back, can they leave a voicemail: [x] Yes [] No    Chico Byrd Rep   07/30/25 12:25 EDT

## 2025-08-11 DIAGNOSIS — G90.511 COMPLEX REGIONAL PAIN SYNDROME TYPE 1 OF RIGHT UPPER EXTREMITY: ICD-10-CM

## 2025-08-11 DIAGNOSIS — M47.812 CERVICAL SPONDYLOSIS WITHOUT MYELOPATHY: ICD-10-CM

## 2025-08-18 DIAGNOSIS — G90.511 COMPLEX REGIONAL PAIN SYNDROME TYPE 1 OF RIGHT UPPER EXTREMITY: ICD-10-CM

## 2025-08-19 ENCOUNTER — TELEPHONE (OUTPATIENT)
Dept: NEUROSURGERY | Facility: CLINIC | Age: 50
End: 2025-08-19
Payer: MEDICARE

## 2025-08-19 DIAGNOSIS — E44.1 MILD MALNUTRITION: Primary | ICD-10-CM

## 2025-08-19 RX ORDER — GABAPENTIN 600 MG/1
600 TABLET, FILM COATED ORAL DAILY
Qty: 30 TABLET | Refills: 3 | Status: SHIPPED | OUTPATIENT
Start: 2025-08-19

## (undated) DEVICE — MEDI-VAC YANKAUER SUCTION HANDLE W/BULBOUS TIP: Brand: CARDINAL HEALTH

## (undated) DEVICE — APPL CHLORAPREP W/TINT 26ML BLU

## (undated) DEVICE — 3M™ IOBAN™ 2 ANTIMICROBIAL INCISE DRAPE 6651EZ: Brand: IOBAN™ 2

## (undated) DEVICE — GLV SURG BIOGEL LTX PF 8

## (undated) DEVICE — PK NEURO DISC 10

## (undated) DEVICE — SNAP KOVER: Brand: UNBRANDED

## (undated) DEVICE — 3M™ STERI-STRIP™ REINFORCED ADHESIVE SKIN CLOSURES, R1547, 1/2 IN X 4 IN (12 MM X 100 MM), 6 STRIPS/ENVELOPE: Brand: 3M™ STERI-STRIP™

## (undated) DEVICE — ANTIBACTERIAL UNDYED BRAIDED (POLYGLACTIN 910), SYNTHETIC ABSORBABLE SUTURE: Brand: COATED VICRYL

## (undated) DEVICE — Device: Brand: JELCO

## (undated) DEVICE — SPNG LAP PREWSH SFTPK 18X18IN STRL PK/5

## (undated) DEVICE — ENCORE® LATEX MICRO SIZE 7, STERILE LATEX POWDER-FREE SURGICAL GLOVE: Brand: ENCORE

## (undated) DEVICE — DRP MICROSCOP ELIMINATES GLAS COVR

## (undated) DEVICE — ADHS LIQ MASTISOL 2/3ML

## (undated) DEVICE — PROXIMATE RH ROTATING HEAD SKIN STAPLERS (35 WIDE) CONTAINS 35 STAINLESS STEEL STAPLES: Brand: PROXIMATE

## (undated) DEVICE — DRAPE,TOP,102X53,STERILE: Brand: MEDLINE

## (undated) DEVICE — MEDI-VAC NON-CONDUCTIVE SUCTION TUBING: Brand: CARDINAL HEALTH

## (undated) DEVICE — SUT VIC 0 UR6 27IN VCP603H

## (undated) DEVICE — CANNULA,OXY,ADULT,SUPERSOFT,W/7'TUB,UC: Brand: MEDLINE

## (undated) DEVICE — NDL HYPO ECLPS SFTY 22G 1 1/2IN

## (undated) DEVICE — PROGRAMMER CONTRL INTELLIS NEUROSTM

## (undated) DEVICE — C-ARM DRAPE: Brand: DEROYAL

## (undated) DEVICE — SOL LR 1000ML

## (undated) DEVICE — SUT SILK 2/0 SH CR8 18IN CR8 C012D

## (undated) DEVICE — ENCORE® LATEX MICRO SIZE 8, STERILE LATEX POWDER-FREE SURGICAL GLOVE: Brand: ENCORE

## (undated) DEVICE — DRSNG WND GZ PAD BORDERED 4X8IN STRL

## (undated) DEVICE — Device

## (undated) DEVICE — 3M™ STERI-DRAPE™ INSTRUMENT POUCH 1018: Brand: STERI-DRAPE™

## (undated) DEVICE — ADAPT ST INFUS ADMIN SYR 70IN

## (undated) DEVICE — SUT VIC PLS CTD ANTIB BR 3/0 8/18IN 45CM

## (undated) DEVICE — HOOK 859-160 5.5 RIGHT ANGLED TI: Brand: CD HORIZON® SPINAL SYSTEM

## (undated) DEVICE — NDL HYPO ECLPS SFTY 25G 1 1/2IN

## (undated) DEVICE — DISPOSABLE BIPOLAR FORCEPS 7 3/4" (19.7CM) SCOVILLE BAYONET, INSULATED, 1.5MM TIP AND 12 FT. (3.6M) CABLE: Brand: KIRWAN

## (undated) DEVICE — DRSNG WND BORDR/ADHS NONADHR/GZ LF 4X4IN STRL

## (undated) DEVICE — SPNG GZ WOVN 4X4IN 12PLY 10/BX STRL

## (undated) DEVICE — SYR CONTRL LUERLOK 10CC

## (undated) DEVICE — APPL CHLORAPREP TINTED 26ML TEAL

## (undated) DEVICE — PAD GRND REM POLYHESIVE A/ DISP

## (undated) DEVICE — TOOL 14MH30 LEGEND 14CM 3MM: Brand: MIDAS REX ™

## (undated) DEVICE — GLV SURG PREMIERPRO MIC LTX PF SZ8 BRN

## (undated) DEVICE — TIBURON SPLIT SHEET: Brand: CONVERTORS

## (undated) DEVICE — AIRWY 90MM NO9

## (undated) DEVICE — NDL SPINAL QUINCKE 22G 3IN BLK

## (undated) DEVICE — SHEET, DRAPE, SPLIT, STERILE: Brand: MEDLINE

## (undated) DEVICE — NEEDLE, QUINCKE, 22GX3.5": Brand: MEDLINE

## (undated) DEVICE — JP PERF DRN SIL FLT 7MM FULL: Brand: CARDINAL HEALTH

## (undated) DEVICE — MAYFIELD® DISPOSABLE ADULT SKULL PIN (PLASTIC BASE): Brand: MAYFIELD®

## (undated) DEVICE — ELECTRD BLD EDGE/INSUL1P SFTY SLV 2.75IN

## (undated) DEVICE — CHRGR BATRY STIM INTELLIS NEUROSTM SYS

## (undated) DEVICE — ST EXT IV SMARTSITE 2VLV SP M LL 5ML IV1